# Patient Record
Sex: FEMALE | Race: WHITE | NOT HISPANIC OR LATINO | Employment: FULL TIME | URBAN - METROPOLITAN AREA
[De-identification: names, ages, dates, MRNs, and addresses within clinical notes are randomized per-mention and may not be internally consistent; named-entity substitution may affect disease eponyms.]

---

## 2017-01-04 ENCOUNTER — TRANSCRIBE ORDERS (OUTPATIENT)
Dept: ADMINISTRATIVE | Facility: HOSPITAL | Age: 42
End: 2017-01-04

## 2017-01-04 DIAGNOSIS — Z12.31 VISIT FOR SCREENING MAMMOGRAM: Primary | ICD-10-CM

## 2017-01-05 ENCOUNTER — HOSPITAL ENCOUNTER (OUTPATIENT)
Dept: MAMMOGRAPHY | Facility: MEDICAL CENTER | Age: 42
Discharge: HOME | End: 2017-01-05
Payer: COMMERCIAL

## 2017-01-05 ENCOUNTER — GENERIC CONVERSION - ENCOUNTER (OUTPATIENT)
Dept: OTHER | Facility: OTHER | Age: 42
End: 2017-01-05

## 2017-01-05 DIAGNOSIS — Z12.31 VISIT FOR SCREENING MAMMOGRAM: ICD-10-CM

## 2017-01-05 PROCEDURE — G0202 SCR MAMMO BI INCL CAD: HCPCS

## 2017-01-05 PROCEDURE — 77063 BREAST TOMOSYNTHESIS BI: CPT

## 2017-01-20 ENCOUNTER — TRANSCRIBE ORDERS (OUTPATIENT)
Dept: ADMINISTRATIVE | Facility: HOSPITAL | Age: 42
End: 2017-01-20

## 2017-01-20 DIAGNOSIS — R93.0 FAMILIAL ENLARGEMENT OF THE SELLA TURCICA: Primary | ICD-10-CM

## 2017-02-01 ENCOUNTER — HOSPITAL ENCOUNTER (OUTPATIENT)
Dept: MRI IMAGING | Facility: HOSPITAL | Age: 42
Discharge: HOME | End: 2017-02-01
Payer: COMMERCIAL

## 2017-02-01 DIAGNOSIS — R93.0 ABNORMAL FINDINGS ON DIAGNOSTIC IMAGING OF SKULL AND HEAD, NOT ELSEWHERE CLASSIFIED: ICD-10-CM

## 2017-02-01 PROCEDURE — A9585 GADOBUTROL INJECTION: HCPCS | Performed by: PHYSICIAN ASSISTANT

## 2017-02-01 PROCEDURE — 70553 MRI BRAIN STEM W/O & W/DYE: CPT

## 2017-02-01 RX ADMIN — GADOBUTROL 4.5 ML: 604.72 INJECTION INTRAVENOUS at 07:35

## 2017-03-20 ENCOUNTER — ALLSCRIPTS OFFICE VISIT (OUTPATIENT)
Dept: OTHER | Facility: OTHER | Age: 42
End: 2017-03-20

## 2017-03-25 ENCOUNTER — LAB CONVERSION - ENCOUNTER (OUTPATIENT)
Dept: OTHER | Facility: OTHER | Age: 42
End: 2017-03-25

## 2017-03-25 LAB — TSH SERPL DL<=0.05 MIU/L-ACNC: 1.29 MIU/L

## 2017-03-26 ENCOUNTER — GENERIC CONVERSION - ENCOUNTER (OUTPATIENT)
Dept: OTHER | Facility: OTHER | Age: 42
End: 2017-03-26

## 2017-12-07 ENCOUNTER — LAB CONVERSION - ENCOUNTER (OUTPATIENT)
Dept: OTHER | Facility: OTHER | Age: 42
End: 2017-12-07

## 2017-12-07 ENCOUNTER — GENERIC CONVERSION - ENCOUNTER (OUTPATIENT)
Dept: OTHER | Facility: OTHER | Age: 42
End: 2017-12-07

## 2017-12-07 LAB — TSH SERPL DL<=0.05 MIU/L-ACNC: 2 MIU/L

## 2017-12-28 ENCOUNTER — GENERIC CONVERSION - ENCOUNTER (OUTPATIENT)
Dept: FAMILY MEDICINE CLINIC | Facility: CLINIC | Age: 42
End: 2017-12-28

## 2018-01-13 NOTE — PROGRESS NOTES
Assessment    1  Hypothyroidism (244 9) (E03 9)   2  Encounter for preventive health examination (V70 0) (Z00 00)    Plan  Health Maintenance    · (1) HEMOGLOBIN A1C; Status:Active; Requested for:2016;    · (LC) Cotinine Screen ONLY, Serum; Status:Active; Requested [id number]; Health Maintenance, Hypothyroidism    · (1) LIPID PANEL FASTING W DIRECT LDL REFLEX; Status:Active; Requested  for:2016;   Hypothyroidism    · (1) CBC/ PLT (NO DIFF); Status:Active; Requested for:2016;    · (1) COMPREHENSIVE METABOLIC PANEL; Status:Active; Requested for:2016;     Discussion/Summary  health maintenance visit Currently, she eats a healthy diet and eats an adequate diet  cervical cancer screening is current Breast cancer screening: the risks and benefits of breast cancer screening were discussed and mammogram is needed every year  Colorectal cancer screening: the risks and benefits of colorectal cancer screening were discussed and colorectal cancer screening is current  Screening lab work includes hemoglobin, glucose, lipid profile and thyroid function testing  Well exam    Continue synthroid, recheck TSH in December  Patient is UTD with health screenings, labs as outlined above  Patient declines flu vaccine  The patient was counseled regarding instructions for management, risk factor reductions, impressions  Chief Complaint  Pt is here for annual physical  Pt offers no complaints  All meds/allergies reviewed with pt   feels better on Synthroid 75 mcg daily   Dr Thapa - gyn  menstrual migraines , chronic  would like to switch Rx to monophasic birth control formulation   otherwise she feels well, no complaints  no dizziness or earache, no tinnitus  sees dermatology every 6 months, recently diagnosed with BCC      History of Present Illness  HM, Adult Female: The patient is being seen for a health maintenance evaluation     General Health:   Screening:   HPI: UTD with mammo      Review of Systems    Constitutional: No fever, no chills, feels well, no tiredness, no recent weight gain or weight loss  Eyes: No complaints of eye pain, no red eyes, no eyesight problems, no discharge, no dry eyes, no itching of eyes  ENT: no complaints of earache, no loss of hearing, no nose bleeds, no nasal discharge, no sore throat, no hoarseness  Cardiovascular: No complaints of slow heart rate, no fast heart rate, no chest pain, no palpitations, no leg claudication, no lower extremity edema  Respiratory: No complaints of shortness of breath, no wheezing, no cough, no SOB on exertion, no orthopnea, no PND  Gastrointestinal: No complaints of abdominal pain, no constipation, no nausea or vomiting, no diarrhea, no bloody stools  Genitourinary: No complaints of dysuria, no incontinence, no pelvic pain, no dysmenorrhea, no vaginal discharge or bleeding  Musculoskeletal: No complaints of arthralgias, no myalgias, no joint swelling or stiffness, no limb pain or swelling  Integumentary: as noted in HPI  Neurological: headache  Psychiatric: Not suicidal, no sleep disturbance, no anxiety or depression, no change in personality, no emotional problems  Endocrine: No complaints of proptosis, no hot flashes, no muscle weakness, no deepening of the voice, no feelings of weakness  Hematologic/Lymphatic: No complaints of swollen glands, no swollen glands in the neck, does not bleed easily, does not bruise easily  Active Problems    1  Abdominal pain, LLQ (left lower quadrant) (789 04) (R10 32)   2  Abnormal MRI of head (793 0) (R93 0)   3  Acne (706 1) (L70 9)   4  Allergic rhinitis (477 9) (J30 9)   5  Allergy (995 3) (T78 40XA)   6  Atypical chest pain (786 59) (R07 89)   7  Bee sting reaction (989 5,E905 3) (T63 441A)   8  Bladder disorder (596 9) (N32 9)   9  Chronic tension-type headache, not intractable (339 12) (G44 229)   10  Compression Fracture Of Thoracic Vertebral Body (805 2)   11   Dyspepsia (536  8) (K30)   12  Dysuria (788 1) (R30 0)   13  Encounter for screening mammogram for breast cancer (V76 12) (Z12 31)   14  Esophageal reflux (530 81) (K21 9)   15  HI (hearing impairment) (389 9) (H91 90)   16  Hypothyroidism (244 9) (E03 9)   17  Irritable bowel syndrome (564 1) (K58 9)   18  Left Leg Strain (844 9)   19  Lower abdominal pain (789 09) (R10 30)   20  Sinusitis (473 9) (J32 9)   21  Upper respiratory infection (465 9) (J06 9)   22  Vertigo (780 4) (R42)    Past Medical History    · History of Acute serous otitis media, unspecified laterality    Surgical History    · History of Hernia Repair   · History of Wrist Surgery    Family History  Mother    · Family history of Hypothyroidism   · Family history of Migraine Headache  Father    · Family history of Pure Hypercholesterolemia  Paternal Grandmother    · Family history of Ovarian Cancer (V16 41)    Social History    · Being A Social Drinker   · Caffeine Use   · Denied: History of Drug Use   · Marital History - Currently    · Never A Smoker    Current Meds   1  Claritin CAPS; Therapy: (Ebb Door) to Recorded   2  EpiPen 2-Sukhdev 0 3 MG/0 3ML Injection Solution Auto-injector; use as directed  Requested   for: 63Uys4897; Last Rx:27Ioz2111 Ordered   3  Esomeprazole Magnesium 40 MG Oral Capsule Delayed Release; TAKE ONE   CAPSULE BY MOUTH EVERY DAY; Therapy: 55Azs0785 to (Evaluate:47Vfe1536)  Requested for: 00FHX5861; Last   Rx:18Nov2015 Ordered   4  Levothyroxine Sodium 75 MCG Oral Tablet; take 1 tablet by mouth every day; Therapy: 28OGD4166 to (Kwan Crumble)  Requested for: 92Mcc5233; Last   Rx:01Svo9496 Ordered   5  Nasonex 50 MCG/ACT Nasal Suspension; 2 SPRAYS INTO EACH NOSTRIL DAILY    Requested for: 29JJO9512; Last Rx:31Dnc5484 Ordered   6  NexIUM 40 MG Oral Capsule Delayed Release; TAKE 1 CAPSULE ONCE DAILY; Therapy: (Recorded:13Jan2016) to Recorded   7   Ortho Tri-Cyclen Lo 0 18/0 215/0 25 MG-25 MCG Oral Tablet; Therapy: (Recorded:30Mar2015) to Recorded    Allergies    1  Acetaminophen-Codeine #3 TABS   2  benzonatate   3  Fluticasone Propionate SUSP    Vitals   Recorded: 84AWS8023 26:59LI   Systolic 98   Diastolic 62   Heart Rate 72   Temperature 98 3 F   Height 5 ft 1 in   Weight 108 lb    BMI Calculated 20 41   BSA Calculated 1 45     Physical Exam    Constitutional   General appearance: No acute distress, well appearing and well nourished  Eyes   Conjunctiva and lids: No swelling, erythema or discharge  Ears, Nose, Mouth, and Throat   Otoscopic examination: Tympanic membranes translucent with normal light reflex  Canals patent without erythema  Neck   Neck: Supple, symmetric, trachea midline, no masses  Thyroid: Normal, no thyromegaly  Pulmonary   Respiratory effort: No increased work of breathing or signs of respiratory distress  Auscultation of lungs: Clear to auscultation  Cardiovascular   Auscultation of heart: Normal rate and rhythm, normal S1 and S2, no murmurs  Carotid pulses: 2+ bilaterally  Abdominal aorta: Normal     Peripheral vascular exam: Normal     Examination of extremities for edema and/or varicosities: Normal     Chest   Chest: Normal     Abdomen   Abdomen: Non-tender, no masses  Liver and spleen: No hepatomegaly or splenomegaly  Musculoskeletal   Gait and station: Normal     Joints, bones, and muscles: Normal     Range of motion: Normal     Neurologic   Cranial nerves: Cranial nerves II-XII intact  Psychiatric   Judgment and insight: Normal     Orientation to person, place, and time: Normal     Recent and remote memory: Intact  Mood and affect: Normal        Results/Data  U/S Head and Neck ( Soft Tissue) 92IRZ7958 12:55PM Valeria Moran     Test Name Result Flag Reference   U/S Head and Neck (Report)     10/16/2013 1400  10/16/2013 1414  NONE    THYROID ULTRASOUND    INDICATION- Hypothyroidism      COMPARISON- MRI cervical spine 8/6/2013    TECHNIQUE- Ultrasound of the thyroid was performed with a high  frequency linear transducer in transverse and sagittal planes including  volumetric imaging sweeps as well as traditional still imaging  technique  FINDINGS-    Right gland- 5 2 x 1 5 x 1 3 cm  Left gland- 3 6 x 1 4 x 1 2 cm  The isthmus is 0 3 cm in AP dimension  Heterogeneous echogenicity which could be seen in the setting of  Hashimoto's thyroiditis  RIGHT-  No nodules  LEFT-  No nodules  ISTHMUS-   No nodules  IMPRESSION-    Heterogeneous echogenicity which could be seen in the setting of  Hashimoto's thyroiditis  Correlate with laboratory data  No focal nodules  Transcribed on- Lawrence 191, RAD DO  Reading Radiologist- Willow Lew, RAD DO  Releasing Radiologist- RICK Cabral DO  Released Date Time- 10/16/13 1548  ------------------------------------------------------------------------------  [id number]^SHADIA MARAVILLA  [id number]^SHADIA MARAVILLA       Future Appointments    Date/Time Provider Specialty Site   01/16/2017 09:00 AM KEELY Rosario  [id number]     Signatures   Electronically signed by :  KEELY Crook ; Oct  4 2016  6:59PM EST                       (Author)

## 2018-01-14 VITALS
HEIGHT: 61 IN | DIASTOLIC BLOOD PRESSURE: 69 MMHG | RESPIRATION RATE: 14 BRPM | HEART RATE: 84 BPM | WEIGHT: 107 LBS | SYSTOLIC BLOOD PRESSURE: 119 MMHG | BODY MASS INDEX: 20.2 KG/M2

## 2018-01-15 NOTE — RESULT NOTES
Message   Call patient, TSH is normal, continue same dose of Synthroid  Verified Results  (Q) TSH, 3RD GENERATION [id number] 12:20PM [facility]     Test Name Result Flag Reference   TSH 2 40 mIU/L     Reference Range                         > or = 20 Years  0 40-4 50                              Pregnancy Ranges            First trimester    0 26-2 66            Second trimester   0 55-2 73            Third trimester    0 43-2 91       Signatures   Electronically signed by :  KEELY Shi ; Feb 23 2016 11:56AM EST                       (Author)

## 2018-01-15 NOTE — RESULT NOTES
Message    Please call patient  TSH is normal, continue same dose of Synthroid  We will need to repeat blood work in 3 months, slip printed         Verified Results  (Q) TSH, 3RD GENERATION [id number] 11:50AM [facility]   REPORT COMMENT:  FASTING:NO     Test Name Result Flag Reference   TSH 0 59 mIU/L     Reference Range                         > or = 20 Years  0 40-4 50                              Pregnancy Ranges            First trimester    0 26-2 66            Second trimester   0 55-2 73            Third trimester    0 43-2 91       Signatures  Electronically signed by :  KEELY Shi ; Sep 17 2016  7:45PM EST                       (Author)

## 2018-01-18 NOTE — RESULT NOTES
Message   Please contact patient, TSH is normal, continue same dose of Synthroid  Thank you     Verified Results  (Q) TSH, 3RD GENERATION [id number] 12:05PM Eston Loly     Test Name Result Flag Reference   TSH 1 29 mIU/L     Reference Range                         > or = 20 Years  0 40-4 50                              Pregnancy Ranges            First trimester    0 26-2 66            Second trimester   0 55-2 73            Third trimester    0 43-2 91       Signatures   Electronically signed by :  KEELY Barros ; Mar 26 2017  9:44AM EST                       (Author)

## 2018-03-05 ENCOUNTER — TRANSCRIBE ORDERS (OUTPATIENT)
Dept: ADMINISTRATIVE | Facility: HOSPITAL | Age: 43
End: 2018-03-05

## 2018-03-05 DIAGNOSIS — Z12.39 SCREENING BREAST EXAMINATION: Primary | ICD-10-CM

## 2018-03-08 DIAGNOSIS — G43.009 MIGRAINE WITHOUT AURA AND WITHOUT STATUS MIGRAINOSUS, NOT INTRACTABLE: Primary | ICD-10-CM

## 2018-03-08 RX ORDER — SUMATRIPTAN 50 MG/1
TABLET, FILM COATED ORAL
Qty: 9 TABLET | Refills: 3 | Status: SHIPPED | OUTPATIENT
Start: 2018-03-08 | End: 2018-03-20 | Stop reason: SDUPTHER

## 2018-03-16 ENCOUNTER — HOSPITAL ENCOUNTER (OUTPATIENT)
Dept: MAMMOGRAPHY | Facility: MEDICAL CENTER | Age: 43
Discharge: HOME | End: 2018-03-16
Payer: COMMERCIAL

## 2018-03-16 DIAGNOSIS — Z12.39 SCREENING BREAST EXAMINATION: ICD-10-CM

## 2018-03-16 PROCEDURE — 77063 BREAST TOMOSYNTHESIS BI: CPT

## 2018-03-16 PROCEDURE — 77067 SCR MAMMO BI INCL CAD: CPT

## 2018-03-20 ENCOUNTER — OFFICE VISIT (OUTPATIENT)
Dept: NEUROLOGY | Facility: CLINIC | Age: 43
End: 2018-03-20
Payer: COMMERCIAL

## 2018-03-20 ENCOUNTER — OFFICE VISIT (OUTPATIENT)
Dept: FAMILY MEDICINE CLINIC | Facility: CLINIC | Age: 43
End: 2018-03-20
Payer: COMMERCIAL

## 2018-03-20 VITALS
BODY MASS INDEX: 20.01 KG/M2 | HEART RATE: 80 BPM | HEIGHT: 61 IN | DIASTOLIC BLOOD PRESSURE: 70 MMHG | RESPIRATION RATE: 16 BRPM | SYSTOLIC BLOOD PRESSURE: 122 MMHG | WEIGHT: 106 LBS | TEMPERATURE: 98 F

## 2018-03-20 VITALS
SYSTOLIC BLOOD PRESSURE: 115 MMHG | HEART RATE: 83 BPM | HEIGHT: 61 IN | WEIGHT: 105 LBS | DIASTOLIC BLOOD PRESSURE: 63 MMHG | BODY MASS INDEX: 19.83 KG/M2

## 2018-03-20 DIAGNOSIS — H90.5 SENSORINEURAL HEARING LOSS (SNHL) OF LEFT EAR, UNSPECIFIED HEARING STATUS ON CONTRALATERAL SIDE: ICD-10-CM

## 2018-03-20 DIAGNOSIS — R93.0 ABNORMAL MRI OF HEAD: ICD-10-CM

## 2018-03-20 DIAGNOSIS — R93.0 ABNORMAL MRI OF HEAD: Primary | ICD-10-CM

## 2018-03-20 DIAGNOSIS — E03.9 HYPOTHYROIDISM, UNSPECIFIED TYPE: ICD-10-CM

## 2018-03-20 DIAGNOSIS — G43.009 MIGRAINE WITHOUT AURA AND WITHOUT STATUS MIGRAINOSUS, NOT INTRACTABLE: ICD-10-CM

## 2018-03-20 DIAGNOSIS — G43.809 HEADACHE, VARIANT MIGRAINE: ICD-10-CM

## 2018-03-20 DIAGNOSIS — Z00.00 WELL ADULT EXAM: Primary | ICD-10-CM

## 2018-03-20 PROCEDURE — 99396 PREV VISIT EST AGE 40-64: CPT | Performed by: FAMILY MEDICINE

## 2018-03-20 PROCEDURE — 99214 OFFICE O/P EST MOD 30 MIN: CPT | Performed by: PSYCHIATRY & NEUROLOGY

## 2018-03-20 RX ORDER — LORATADINE 10 MG/1
CAPSULE, LIQUID FILLED ORAL
COMMUNITY

## 2018-03-20 RX ORDER — LEVOTHYROXINE SODIUM 0.07 MG/1
1 TABLET ORAL DAILY
COMMUNITY
Start: 2013-06-06 | End: 2018-08-28 | Stop reason: SDUPTHER

## 2018-03-20 RX ORDER — DIPHENOXYLATE HYDROCHLORIDE AND ATROPINE SULFATE 2.5; .025 MG/1; MG/1
1 TABLET ORAL DAILY
COMMUNITY

## 2018-03-20 RX ORDER — NORETHINDRONE ACETATE AND ETHINYL ESTRADIOL, ETHINYL ESTRADIOL AND FERROUS FUMARATE 1MG-10(24)
1 KIT ORAL DAILY
COMMUNITY
Start: 2018-03-08 | End: 2022-06-23 | Stop reason: SDUPTHER

## 2018-03-20 RX ORDER — SUMATRIPTAN 50 MG/1
TABLET, FILM COATED ORAL
Qty: 9 TABLET | Refills: 3 | Status: SHIPPED | OUTPATIENT
Start: 2018-03-20 | End: 2018-11-07

## 2018-03-20 RX ORDER — EPINEPHRINE 0.3 MG/.3ML
0.3 INJECTION SUBCUTANEOUS AS NEEDED
COMMUNITY
End: 2021-04-05 | Stop reason: SDUPTHER

## 2018-03-20 NOTE — PROGRESS NOTES
FAMILY PRACTICE OFFICE VISIT       NAME: Олег Nettles  AGE: 43 y o  SEX: female       : 1975        MRN: [id number]    DATE: 3/20/2018  TIME: 11:28 AM    Assessment and Plan     Problem List Items Addressed This Visit     Hypothyroidism    Relevant Medications    levothyroxine 75 mcg tablet    Other Relevant Orders    CBC    Comprehensive metabolic panel    Lipid panel    TSH, 3rd generation    Vitamin D 25 hydroxy    Abnormal MRI of head    Hearing loss    Relevant Orders    Ambulatory Referral to Otolaryngology    Comprehensive metabolic panel    Lipid panel    TSH, 3rd generation    Headache, variant migraine    Relevant Orders    CBC    Vitamin D 25 hydroxy      Other Visit Diagnoses     Well adult exam    -  Primary      Annual well exam   No concerns  Migraine headaches  Significantly improved on low-dose birth control pill therapy  Patient has pending follow-up with Neurology today  Hypothyroidism-continue Synthroid 75 mcg daily  Patient will proceed with routine blood work  She is up-to-date with gyn exam and mammography  Patient will schedule follow-up with ENT regarding left-sided hearing loss  Follow-up pending blood work results, annually and as needed    There are no Patient Instructions on file for this visit  Chief Complaint     Chief Complaint   Patient presents with    Physical Exam     Pt is here for annual exam        History of Present Illness     Annual  Well exam  Menstrual  Migraines, imtelyproved  la  Patient is on  Lo-Loestrin -  3 packs back to  Back -   Uses Imitrex prn, it works well  Pending  Neurology OV today in ; Pending OV with Dr Davis today   Left  Hearing  Loss- past due for ENT exam for follow up of left sided hearing loss   UTD with GYN  UTD with mammo  Patient remains under care of neurology  Re : abnormal MRI brain  Patient remains active, exercises regularly  She denies chest pain, palpitations, shortness of breath or dizziness    No exertional symptoms  No abdominal pain, no arthralgias  Review of Systems   Review of Systems   HENT: Positive for hearing loss  Neurological: Positive for headaches  All other systems reviewed and are negative  Active Problem List     Patient Active Problem List   Diagnosis    Hypothyroidism    Abnormal MRI of head    Hearing loss    Headache, variant migraine       Past Medical History:  No past medical history on file  Past Surgical History:  Past Surgical History:   Procedure Laterality Date    HERNIA REPAIR Left 09/2014    Sports Hernia Repair    WRIST SURGERY Right 03/2009    Dr Hemalatha Reese       Family History:  Family History   Problem Relation Age of Onset    Hypothyroidism Mother     Migraines Mother     Hyperlipidemia Father      Pure    Ovarian cancer Paternal Grandmother        Social History:  Social History     Social History    Marital status: /Civil Union     Spouse name: N/A    Number of children: N/A    Years of education: N/A     Occupational History    Not on file  Social History Main Topics    Smoking status: Never Smoker    Smokeless tobacco: Never Used    Alcohol use Yes      Comment: Social     Drug use: No    Sexual activity: Not on file     Other Topics Concern    Not on file     Social History Narrative    Caffeine use           Objective     Vitals:    03/20/18 1102   BP: 122/70   Pulse: 80   Resp: 16   Temp: 98 °F (36 7 °C)     Wt Readings from Last 3 Encounters:   03/20/18 48 1 kg (106 lb)   03/20/17 48 5 kg (107 lb)   02/01/17 46 7 kg (103 lb)       Physical Exam   Constitutional: She is oriented to person, place, and time  She appears well-developed and well-nourished  HENT:   Head: Normocephalic and atraumatic  Eyes: Conjunctivae are normal    Neck: Neck supple  Carotid bruit is not present  No thyromegaly present  Cardiovascular: Normal rate, regular rhythm and normal heart sounds  No murmur heard    Pulmonary/Chest: Effort normal and breath sounds normal  No respiratory distress  She has no wheezes  She has no rales  Abdominal: Soft  Normal aorta and bowel sounds are normal  She exhibits no distension and no abdominal bruit  There is no tenderness  There is no rebound  Musculoskeletal: Normal range of motion  Neurological: She is alert and oriented to person, place, and time  No cranial nerve deficit  Psychiatric: She has a normal mood and affect  Her behavior is normal    Nursing note and vitals reviewed  Pertinent Laboratory/Diagnostic Studies:  Lab Results   Component Value Date    GLUCOSE 72 11/02/2015    BUN 10 10/19/2016    CREATININE 0 70 10/19/2016    CALCIUM 9 4 10/19/2016     10/19/2016    K 4 6 10/19/2016    CO2 28 10/19/2016     10/19/2016     Lab Results   Component Value Date    ALT 11 10/19/2016    AST 14 10/19/2016    ALKPHOS 37 10/19/2016    BILITOT 0 4 10/19/2016       Lab Results   Component Value Date    WBC 5 1 10/19/2016    HGB 12 6 10/19/2016    HCT 39 2 10/19/2016    MCV 95 3 10/19/2016     10/19/2016       No results found for: TSH    Lab Results   Component Value Date    CHOL 200 10/19/2016     Lab Results   Component Value Date    TRIG 85 10/19/2016     Lab Results   Component Value Date    HDL 76 10/19/2016     Lab Results   Component Value Date    LDLCALC 108 (H) 11/02/2015     Lab Results   Component Value Date    HGBA1C 5 5 10/19/2016       Results for orders placed or performed in visit on 12/07/17   TSH, 3RD GENERATION (HISTORICAL)   Result Value Ref Range    TSH 3RD GENERATON 2 00 mIU/L       Orders Placed This Encounter   Procedures    CBC    Comprehensive metabolic panel    Lipid panel    TSH, 3rd generation    Vitamin D 25 hydroxy    Ambulatory Referral to Otolaryngology       ALLERGIES:  Allergies   Allergen Reactions    Benzonatate Nausea Only     Category:  Adverse Reaction;     Codeine      Other reaction(s): Palpitations  Reaction Date: 03Aug2011;     Fluticasone Action Taken: nose bleeds;        Current Medications     Current Outpatient Prescriptions   Medication Sig Dispense Refill    EPINEPHrine (EPIPEN 2-ABDOUL) 0 3 mg/0 3 mL SOAJ Inject 0 3 mg as directed as needed        levothyroxine 75 mcg tablet Take 1 tablet by mouth daily      LO LOESTRIN FE 1 MG-10 MCG / 10 MCG TABS Take 1 tablet by mouth daily        Loratadine (CLARITIN) 10 MG CAPS Take by mouth      SUMAtriptan (IMITREX) 50 mg tablet TAKE 1 TABLET AT ONSET OF MIGRAINE  MAY REPEAT ONCE IN 2 HOURS IF NEEDED  9 tablet 3     No current facility-administered medications for this visit            Health Maintenance     Health Maintenance   Topic Date Due    HIV SCREENING  1975    Depression Screening PHQ-9  12/12/1987    INFLUENZA VACCINE  09/01/2017    DTaP,Tdap,and Td Vaccines (2 - Td) 09/18/2023     Immunization History   Administered Date(s) Administered    Tdap 09/18/2013       Lizbet Silva MD

## 2018-03-20 NOTE — PROGRESS NOTES
Patient ID: Sanford Kirkpatrick is a 43 y o  female  Assessment/Plan:    Headache, variant migraine  Pt here for neuro follow up  Pt last here one yr ago  Pt just seen by her pcp today as well  Pt notes no new sxs  No change in neuro exam today  Pt is due for updated audiology  Pt has last mri head done about one yr ago  Re rev that study and no change at that time comp to 2014  Due to stability of sxs and exam will hold on imaging this yr and plan for 2019 unless pt has any other new sxs  Strong family history of migraine in mom and sister         Diagnoses and all orders for this visit:    Abnormal MRI of head    Headache, variant migraine    Sensorineural hearing loss (SNHL) of left ear, unspecified hearing status on contralateral side    Migraine without aura and without status migrainosus, not intractable  -     SUMAtriptan (IMITREX) 50 mg tablet; 1 tab at onset of migraine, may repeat once in 2 hours if needed    Other orders  -     multivitamin (THERAGRAN) TABS; Take 1 tablet by mouth daily           Subjective:    HPI  HPI: Patient is a 43year old female who presents for neurological follow up, last seen in March 2017  Patient was referred for follow up of abnormal MRI of the brain  In 2013, she developed sudden onset of left hearing loss  She continues to follow with ENT and audiology  Patient had initial MRI brain done in Feb 2013 with non-specific WM changes, predominantly in the frontal lobes  Patient with history of migraines as well  Patient had cord imaging which was negative for any cord lesions  MRI brain has been followed for any changes  Pt last seen about one yr ago,  Overall she is doing well  No new sxs  Pt changed jobs this yr  No change in vision  No change in speech or swallowing  No falls or trips  Pt with migraines about once a month to every other month    Pt notes the imitrex does help with her ha,  Discussed use of preventative meds as well and pt not interested at this time which is reasonable due to the improved freq of her ha  Pt is now using loestrin bcp which also has decreased the freq of her has  Pt is aware of risk of cva with migraines and also bcp  Pt with min use of her imitirex as well  No cp or sob with her imitrex med  No falls or trips  No recent fever or chills  Pt notes weather occ affects her ha severity  GYN follows her  oral contraceptive pill that she takes for 3 months and then off for one month to have a period  She does not have any migraines during the months she is taking the pill  When she has her period quarterly, she gets a  migraine  OTC meds have not been very helpful for the migraine  Her sister and mother take Imitrex for their migraines and suggested she ask for a Rx  When she gets a migraine, it is located on the right side of her head above her eye  pt cont to denies any hemisensory loss, visual changes, vertigo, trouble with speech or swallowing  Re rev last MRI brain 2/1/17 compared to 9/2014  Stable MR appearance of the brain  Non-specific WM changes right greater than left frontal lobes are stable  Query complicated migraine  Pt is following up with audiology as directed by her pcp  No facial paresthesias  No diplopia  Total time spent today 25 minutes  Greater than 50% of total time was spent with the patient and / or family counseling and / or coordination of care          The following portions of the patient's history were reviewed and updated as appropriate: allergies, current medications, past family history, past medical history, past social history, past surgical history and problem list          Objective:    Blood pressure 115/63, pulse 83, height 5' 1" (1 549 m), weight 47 6 kg (105 lb), last menstrual period 02/25/2018  Physical Exam   Constitutional: She appears well-developed and well-nourished  Eyes: EOM are normal  Pupils are equal, round, and reactive to light  Cardiovascular: Intact distal pulses  Neurological: She has normal strength and normal reflexes  Psychiatric: Her speech is normal        Neurological Exam    Mental Status  The patient is alert and oriented to person, place, time, and situation  Her recent and remote memory are normal  Her speech is normal  Her language is fluent with no aphasia  She has normal attention span and concentration  She has a normal fund of knowledge  Cranial Nerves    CN II: The patient's visual acuity and visual fields are normal   CN III, IV, VI: The patient's pupils are equally round and reactive to light and ocular movements are normal   CN V: The patient has normal facial sensation  CN VII:  The patient has symmetric facial movement  CN VIII:  The patient's hearing is normal   CN IX, X: The patient has symmetric palate movement and normal gag reflex  CN XI: The patient's shoulder shrug strength is normal   CN XII: The patient's tongue is midline without atrophy or fasciculations  Motor  The patient has normal muscle bulk throughout  Her overall muscle tone is normal throughout  Her strength is 5/5 throughout all four extremities  Sensory  The patient's sensation is normal in all four extremities  Reflexes  Deep tendon reflexes are 2+ and symmetric in all four extremities with downgoing toes bilaterally  Gait and Coordination   She has a normal stride  She has normal right finger to nose coordination  ROS:    Review of Systems   Constitutional: Negative  HENT: Positive for congestion, sinus pain and sinus pressure  Eyes: Negative  Respiratory: Negative  Cardiovascular: Negative  Gastrointestinal: Negative  Endocrine: Negative  Genitourinary: Negative  Musculoskeletal: Negative  Skin: Negative  Allergic/Immunologic: Negative  Neurological: Negative  Hematological: Negative  Psychiatric/Behavioral: Negative

## 2018-03-20 NOTE — ASSESSMENT & PLAN NOTE
Pt here for neuro follow up  Pt last here one yr ago  Pt just seen by her pcp today as well  Pt notes no new sxs  No change in neuro exam today  Pt is due for updated audiology  Pt has last mri head done about one yr ago  Re rev that study and no change at that time comp to 2014  Due to stability of sxs and exam will hold on imaging this yr and plan for 2019 unless pt has any other new sxs  Strong family history of migraine in mom and sister

## 2018-06-04 ENCOUNTER — APPOINTMENT (OUTPATIENT)
Dept: LAB | Facility: CLINIC | Age: 43
End: 2018-06-04
Payer: COMMERCIAL

## 2018-06-04 DIAGNOSIS — H90.5 SENSORINEURAL HEARING LOSS (SNHL) OF LEFT EAR, UNSPECIFIED HEARING STATUS ON CONTRALATERAL SIDE: ICD-10-CM

## 2018-06-04 DIAGNOSIS — E03.9 HYPOTHYROIDISM, UNSPECIFIED TYPE: ICD-10-CM

## 2018-06-04 DIAGNOSIS — G43.809 HEADACHE, VARIANT MIGRAINE: ICD-10-CM

## 2018-06-04 LAB
25(OH)D3 SERPL-MCNC: 26.4 NG/ML (ref 30–100)
ALBUMIN SERPL BCP-MCNC: 3.6 G/DL (ref 3.5–5)
ALP SERPL-CCNC: 40 U/L (ref 46–116)
ALT SERPL W P-5'-P-CCNC: 19 U/L (ref 12–78)
ANION GAP SERPL CALCULATED.3IONS-SCNC: 9 MMOL/L (ref 4–13)
AST SERPL W P-5'-P-CCNC: 12 U/L (ref 5–45)
BILIRUB SERPL-MCNC: 0.41 MG/DL (ref 0.2–1)
BUN SERPL-MCNC: 10 MG/DL (ref 5–25)
CALCIUM SERPL-MCNC: 8.8 MG/DL (ref 8.3–10.1)
CHLORIDE SERPL-SCNC: 105 MMOL/L (ref 100–108)
CHOLEST SERPL-MCNC: 178 MG/DL (ref 50–200)
CO2 SERPL-SCNC: 24 MMOL/L (ref 21–32)
CREAT SERPL-MCNC: 0.8 MG/DL (ref 0.6–1.3)
ERYTHROCYTE [DISTWIDTH] IN BLOOD BY AUTOMATED COUNT: 12.9 % (ref 11.6–15.1)
GFR SERPL CREATININE-BSD FRML MDRD: 91 ML/MIN/1.73SQ M
GLUCOSE P FAST SERPL-MCNC: 74 MG/DL (ref 65–99)
HCT VFR BLD AUTO: 40.7 % (ref 34.8–46.1)
HDLC SERPL-MCNC: 63 MG/DL (ref 40–60)
HGB BLD-MCNC: 13 G/DL (ref 11.5–15.4)
LDLC SERPL CALC-MCNC: 104 MG/DL (ref 0–100)
MCH RBC QN AUTO: 31.3 PG (ref 26.8–34.3)
MCHC RBC AUTO-ENTMCNC: 31.9 G/DL (ref 31.4–37.4)
MCV RBC AUTO: 98 FL (ref 82–98)
NONHDLC SERPL-MCNC: 115 MG/DL
PLATELET # BLD AUTO: 263 THOUSANDS/UL (ref 149–390)
PMV BLD AUTO: 12.2 FL (ref 8.9–12.7)
POTASSIUM SERPL-SCNC: 4.1 MMOL/L (ref 3.5–5.3)
PROT SERPL-MCNC: 7.2 G/DL (ref 6.4–8.2)
RBC # BLD AUTO: 4.16 MILLION/UL (ref 3.81–5.12)
SODIUM SERPL-SCNC: 138 MMOL/L (ref 136–145)
TRIGL SERPL-MCNC: 55 MG/DL
TSH SERPL DL<=0.05 MIU/L-ACNC: 1.52 UIU/ML (ref 0.36–3.74)
WBC # BLD AUTO: 5.71 THOUSAND/UL (ref 4.31–10.16)

## 2018-06-04 PROCEDURE — 36415 COLL VENOUS BLD VENIPUNCTURE: CPT

## 2018-06-04 PROCEDURE — 80061 LIPID PANEL: CPT

## 2018-06-04 PROCEDURE — 84443 ASSAY THYROID STIM HORMONE: CPT

## 2018-06-04 PROCEDURE — 82306 VITAMIN D 25 HYDROXY: CPT

## 2018-06-04 PROCEDURE — 85027 COMPLETE CBC AUTOMATED: CPT

## 2018-06-04 PROCEDURE — 80053 COMPREHEN METABOLIC PANEL: CPT

## 2018-06-07 ENCOUNTER — TELEPHONE (OUTPATIENT)
Dept: FAMILY MEDICINE CLINIC | Facility: CLINIC | Age: 43
End: 2018-06-07

## 2018-06-07 NOTE — TELEPHONE ENCOUNTER
----- Message from Jordan Coronado MD sent at 6/6/2018  4:14 PM EDT -----  Please contact patient  All her blood work was normal aside from decreased level of vitamin-D  Her vitamin-D currently is 26, optimal level is over 40  Please clarify how much vitamin-D she is taking on a daily basis and let me know so I can give her further instructions  Her thyroid function is fine, continue same dose of Synthroid    Thank you

## 2018-06-07 NOTE — TELEPHONE ENCOUNTER
Patient is aware of MD instructions  Patient states that she is not taking a vitamin D supplement at this time

## 2018-08-28 DIAGNOSIS — E03.9 HYPOTHYROIDISM, UNSPECIFIED TYPE: Primary | ICD-10-CM

## 2018-08-28 RX ORDER — LEVOTHYROXINE SODIUM 0.07 MG/1
TABLET ORAL
Qty: 90 TABLET | Refills: 2 | Status: SHIPPED | OUTPATIENT
Start: 2018-08-28 | End: 2019-05-25 | Stop reason: SDUPTHER

## 2018-09-05 ENCOUNTER — EVALUATION (OUTPATIENT)
Dept: PHYSICAL THERAPY | Facility: CLINIC | Age: 43
End: 2018-09-05
Payer: COMMERCIAL

## 2018-09-05 DIAGNOSIS — M76.822 POSTERIOR TIBIAL TENDINITIS OF LEFT LEG: Primary | ICD-10-CM

## 2018-09-05 PROCEDURE — 97162 PT EVAL MOD COMPLEX 30 MIN: CPT | Performed by: PHYSICAL THERAPIST

## 2018-09-05 PROCEDURE — 97140 MANUAL THERAPY 1/> REGIONS: CPT | Performed by: PHYSICAL THERAPIST

## 2018-09-05 PROCEDURE — G8978 MOBILITY CURRENT STATUS: HCPCS | Performed by: PHYSICAL THERAPIST

## 2018-09-05 PROCEDURE — G8979 MOBILITY GOAL STATUS: HCPCS | Performed by: PHYSICAL THERAPIST

## 2018-09-05 NOTE — LETTER
2018    MD Gera [address] [place] [address]    Patient: Leydi Nettles   YOB: 1975   Date of Visit: 2018     Encounter Diagnosis     ICD-10-CM    1  Posterior tibial tendinitis of left leg M76 822        Dear Dr Linda Jackson:    Please review the attached Plan of Care from [facility]'s recent visit  Please verify that you agree therapy should continue by signing the attached document and sending it back to our office  If you have any questions or concerns, please don't hesitate to call  Sincerely,    Yoko Ng, PT      Referring Provider:      I certify that I have read the below Plan of Care and certify the need for these services furnished under this plan of treatment while under my care  MD Gera [address] [facility]: [phone]          PT Evaluation     Today's date: 2018  Patient name: Shashank Wyman  : 1975  MRN: [id number]  Referring provider: Ashley Saul MD  Dx:   Encounter Diagnosis     ICD-10-CM    1  Posterior tibial tendinitis of left leg M76 822                   Assessment  Impairments: abnormal muscle firing, abnormal or restricted ROM, activity intolerance, impaired physical strength, lacks appropriate home exercise program and pain with function    Assessment details: Shashank Wyman is a 43 y o  female present with:   Posterior tibial tendinitis of left leg  (primary encounter diagnosis)    [facility] has the above listed impairments and will benefit from skilled PT to improve deficits to return to prior level of function     Understanding of Dx/Px/POC: good   Prognosis: good    Goals  Impairment Goals  - Decrease pain   - Improve ROM to [facility]  - Increase strength     Functional Goals  - Return to Prior Level of Function  - Increase Functional Status Measure  - Patient will be independent with HEP    Plan  Patient would benefit from: skilled PT  Planned therapy interventions: joint mobilization, manual therapy, patient education, postural training, activity modification, abdominal trunk stabilization, body mechanics training, flexibility, functional ROM exercises, graded exercise, home exercise program, neuromuscular re-education, strengthening, stretching, therapeutic activities and therapeutic exercise  Frequency: 2x week  Duration in weeks: 8  Treatment plan discussed with: patient        Subjective Evaluation    Pain  Current pain rating: 3  At best pain ratin  At worst pain ratin  Relieving factors: rest  Aggravating factors: walking and running  Progression: no change        This Pt presents with chronic L ankle pain  In 2018 she rolled her ankle while running  X-rays were neg  She continued to run with an ankle brace and k-tape  She did some home exs   Pain persisted intermittently  Feels better with supportive shoes and KT tape  Pain is in region of post tib tendon  She has reduced her running  She presently runs 2x/wk about 3 miles ea, swims and uses elliptical   Would like to return to running 15-20 miles per week        Objective  Mild pes planus/calc valgum R > L  TTP post tib tendon, med tib border, navicular, ATF L   PROM: WFL      MMT L  Inv 4-/5  Ev 4-/5   DF     Gait:  Excessive STJ Pronation  + "too many toes"    RCSP  R everted  L everted    - ant drawer  Hallux DF PROM decreased bilat        Precautions: none    Daily Treatment Diary       Manuals             GT post tib tendon 6'            Hallux PROM             Laser post tib 4'                         Exercise Diary              tband inv/ev             Heel raises             Cross over step up             S/l abd             Clam shells             Gastroc/soleus stretch             SLB                                                                 Check hip strength             Casting Modalities

## 2018-09-05 NOTE — PROGRESS NOTES
PT Evaluation     Today's date: 2018  Patient name: Shane Alarcon  : 1975  MRN: [id number]  Referring provider: Ivan Park MD  Dx:   Encounter Diagnosis     ICD-10-CM    1  Posterior tibial tendinitis of left leg M76 822                   Assessment  Impairments: abnormal muscle firing, abnormal or restricted ROM, activity intolerance, impaired physical strength, lacks appropriate home exercise program and pain with function    Assessment details: Shane Alarcon is a 43 y o  female present with:   Posterior tibial tendinitis of left leg  (primary encounter diagnosis)    Raisa Grant has the above listed impairments and will benefit from skilled PT to improve deficits to return to prior level of function  Understanding of Dx/Px/POC: good   Prognosis: good    Goals  Impairment Goals  - Decrease pain   - Improve ROM to [facility]  - Increase strength     Functional Goals  - Return to Prior Level of Function  - Increase Functional Status Measure  - Patient will be independent with HEP    Plan  Patient would benefit from: skilled PT  Planned therapy interventions: joint mobilization, manual therapy, patient education, postural training, activity modification, abdominal trunk stabilization, body mechanics training, flexibility, functional ROM exercises, graded exercise, home exercise program, neuromuscular re-education, strengthening, stretching, therapeutic activities and therapeutic exercise  Frequency: 2x week  Duration in weeks: 8  Treatment plan discussed with: patient        Subjective Evaluation    Pain  Current pain rating: 3  At best pain ratin  At worst pain ratin  Relieving factors: rest  Aggravating factors: walking and running  Progression: no change        This Pt presents with chronic L ankle pain  In 2018 she rolled her ankle while running  X-rays were neg  She continued to run with an ankle brace and k-tape  She did some home exs   Pain persisted intermittently    Feels better with supportive shoes and KT tape  Pain is in region of post tib tendon  She has reduced her running  She presently runs 2x/wk about 3 miles ea, swims and uses elliptical   Would like to return to running 15-20 miles per week        Objective  Mild pes planus/calc valgum R > L  TTP post tib tendon, med tib border, navicular, ATF L   PROM: WFL      MMT L  Inv 4-/5  Ev 4-/5   DF     Gait:  Excessive STJ Pronation  + "too many toes"    RCSP  R everted  L everted    - ant drawer  Hallux DF PROM decreased bilat        Precautions: none    Daily Treatment Diary       Manuals 9/5            GT post tib tendon 6'            Hallux PROM             Laser post tib 4'                         Exercise Diary              tband inv/ev             Heel raises             Cross over step up             S/l abd             Clam shells             Gastroc/soleus stretch             SLB                                                                 Check hip strength             Casting                                                                                                                                  Modalities

## 2018-09-06 ENCOUNTER — TRANSCRIBE ORDERS (OUTPATIENT)
Dept: PHYSICAL THERAPY | Facility: CLINIC | Age: 43
End: 2018-09-06

## 2018-09-06 DIAGNOSIS — M76.822 POSTERIOR TIBIAL TENDINITIS OF LEFT LEG: Primary | ICD-10-CM

## 2018-09-13 ENCOUNTER — OFFICE VISIT (OUTPATIENT)
Dept: PHYSICAL THERAPY | Facility: CLINIC | Age: 43
End: 2018-09-13
Payer: COMMERCIAL

## 2018-09-13 DIAGNOSIS — M76.822 POSTERIOR TIBIAL TENDINITIS OF LEFT LEG: Primary | ICD-10-CM

## 2018-09-13 PROCEDURE — 97140 MANUAL THERAPY 1/> REGIONS: CPT | Performed by: PHYSICAL THERAPIST

## 2018-09-13 PROCEDURE — L3010 FOOT LONGITUDINAL ARCH SUPPO: HCPCS | Performed by: PHYSICAL THERAPIST

## 2018-09-13 PROCEDURE — 97110 THERAPEUTIC EXERCISES: CPT | Performed by: PHYSICAL THERAPIST

## 2018-09-13 NOTE — PROGRESS NOTES
Daily Note     Today's date: 2018  Patient name: Elvin Henriquez  : 1975  MRN: [id number]  Referring provider: Ghanshyam Cole MD  Dx:   Encounter Diagnosis     ICD-10-CM    1  Posterior tibial tendinitis of left leg M76 822                   Subjective: no signif changes reported      Objective: See treatment diary below  Precautions: none     Daily Treatment Diary         Manuals                    GT post tib tendon 6'                     Hallux PROM                       Laser post tib 4'                                             Exercise Diary                        tband inv/ev                       Heel raises                       Cross over step up                       S/l abd                       Clam shells                       Gastroc/soleus stretch                       SLB                                                                                                                       Check hip strength                       Casting    x                                                                                                                                                                                                                                           Modalities                                                                              Assessment: Tolerated treatment well  Patient exhibited good technique with therapeutic exercises      Plan: Continue per plan of care

## 2018-09-19 ENCOUNTER — OFFICE VISIT (OUTPATIENT)
Dept: PHYSICAL THERAPY | Facility: CLINIC | Age: 43
End: 2018-09-19
Payer: COMMERCIAL

## 2018-09-19 DIAGNOSIS — M76.822 POSTERIOR TIBIAL TENDINITIS OF LEFT LEG: Primary | ICD-10-CM

## 2018-09-19 PROCEDURE — 97140 MANUAL THERAPY 1/> REGIONS: CPT | Performed by: PHYSICAL THERAPIST

## 2018-09-19 PROCEDURE — 97112 NEUROMUSCULAR REEDUCATION: CPT | Performed by: PHYSICAL THERAPIST

## 2018-09-19 PROCEDURE — 97110 THERAPEUTIC EXERCISES: CPT | Performed by: PHYSICAL THERAPIST

## 2018-09-19 NOTE — PROGRESS NOTES
Daily Note     Today's date: 2018  Patient name: Chente Pérez  : 1975  MRN: [id number]  Referring provider: Donna Beltran MD  Dx:   Encounter Diagnosis     ICD-10-CM    1  Posterior tibial tendinitis of left leg M76 822                   Subjective: pt reports some soreness from GT  Objective: See treatment diary below  Precautions: none     Daily Treatment Diary         Manuals                  GT post tib tendon 6'  6'  6'                 Hallux PROM    3'  3'                 Laser post tib 4'  4'  4'                                         Exercise Diary                        tband inv/ev, CC inv    bl x30  b 3x10                 Heel raises: 3 ways   10 ea 10 ea                 Cross over step up                       S/l abd    3x10  30                 Clam shells    3x10  30                 Gastroc/soleus stretch    30" ea  30" ea                 SLB    3' 3'                                                                                                                  Check hip strength                       Casting    x                                                                                                                                                                                                                                           Modalities                                                                              Assessment: Tolerated treatment well  Patient exhibited good technique with therapeutic exercises      Plan: Continue per plan of care

## 2018-09-21 ENCOUNTER — APPOINTMENT (OUTPATIENT)
Dept: PHYSICAL THERAPY | Facility: CLINIC | Age: 43
End: 2018-09-21
Payer: COMMERCIAL

## 2018-09-24 ENCOUNTER — OFFICE VISIT (OUTPATIENT)
Dept: PHYSICAL THERAPY | Facility: CLINIC | Age: 43
End: 2018-09-24
Payer: COMMERCIAL

## 2018-09-24 DIAGNOSIS — M76.822 POSTERIOR TIBIAL TENDINITIS OF LEFT LEG: Primary | ICD-10-CM

## 2018-09-24 PROCEDURE — 97110 THERAPEUTIC EXERCISES: CPT | Performed by: PHYSICAL THERAPIST

## 2018-09-24 PROCEDURE — 97112 NEUROMUSCULAR REEDUCATION: CPT | Performed by: PHYSICAL THERAPIST

## 2018-09-24 PROCEDURE — 97140 MANUAL THERAPY 1/> REGIONS: CPT | Performed by: PHYSICAL THERAPIST

## 2018-09-24 NOTE — PROGRESS NOTES
Daily Note     Today's date: 2018  Patient name: Anna Marie Mata  : 1975  MRN: [id number]  Referring provider: Cheikh Lo MD  Dx:   Encounter Diagnosis     ICD-10-CM    1  Posterior tibial tendinitis of left leg M76 822                   Subjective: pt reports some soreness from GT  Objective: See treatment diary below  Precautions: none     Daily Treatment Diary         Manuals                GT post tib tendon 6'  6'  6'  nv               Hallux PROM    3'  3'  3'               Laser post tib 4'  4'  4'  5'                                       Exercise Diary                        tband inv/ev, CC inv    bl x30  b 3x10  b 3x10               Heel raises: 3 ways   10 ea 10 ea  10 ea               Cross over step up                       S/l abd    3x10  30  30               Clam shells    3x10  30  30               Gastroc/soleus stretch    30" ea  30" ea  30"               SLB    3' 3'   3'                                                                                                               Check hip strength                       Casting    x                                                                                                                                                                                                                                           Modalities                                                                              Assessment: Tolerated treatment well  Patient exhibited good technique with therapeutic exercises      Plan: Continue per plan of care

## 2018-09-26 ENCOUNTER — APPOINTMENT (OUTPATIENT)
Dept: PHYSICAL THERAPY | Facility: CLINIC | Age: 43
End: 2018-09-26
Payer: COMMERCIAL

## 2018-10-01 ENCOUNTER — APPOINTMENT (OUTPATIENT)
Dept: PHYSICAL THERAPY | Facility: CLINIC | Age: 43
End: 2018-10-01
Payer: COMMERCIAL

## 2018-10-02 ENCOUNTER — OFFICE VISIT (OUTPATIENT)
Dept: PHYSICAL THERAPY | Facility: CLINIC | Age: 43
End: 2018-10-02
Payer: COMMERCIAL

## 2018-10-02 DIAGNOSIS — M76.822 POSTERIOR TIBIAL TENDINITIS OF LEFT LEG: Primary | ICD-10-CM

## 2018-10-02 PROCEDURE — 97140 MANUAL THERAPY 1/> REGIONS: CPT | Performed by: PHYSICAL THERAPIST

## 2018-10-02 PROCEDURE — 97112 NEUROMUSCULAR REEDUCATION: CPT | Performed by: PHYSICAL THERAPIST

## 2018-10-02 PROCEDURE — 97110 THERAPEUTIC EXERCISES: CPT | Performed by: PHYSICAL THERAPIST

## 2018-10-02 NOTE — PROGRESS NOTES
Daily Note     Today's date: 10/2/2018  Patient name: Danilo Power  : 1975  MRN: [id number]  Referring provider: Abdullahi Baeza MD  Dx:   Encounter Diagnosis     ICD-10-CM    1  Posterior tibial tendinitis of left leg M76 822                   Subjective: pt reports feeling stronger in ankles and hips  Ran > 3 miles with only c/o tightness  Objective: See treatment diary below  Precautions: none     Daily Treatment Diary         Manuals 9/5 9/13  9/19  9/24  10/2             GT post tib tendon 6'  6'  6'  nv  6'             Hallux PROM    3'  3'  3'  2'             Laser post tib 4'  4'  4'  5'  4'                                     Exercise Diary                        tband inv/ev, CC inv    bl x30  b 3x10  b 3x10  b 3x10             Heel raises: 3 ways   10 ea 10 ea  10 ea  10 ea             Cross over step up                       S/l abd    3x10  30  30  30             Clam shells    3x10  30  30  30             Gastroc/soleus stretch    30" ea  30" ea  30"  30             SLB    3' 3'   3'  3;                                                                                                             Check hip strength                       Casting    x                    dispensed orth         x                                                                                                                                                                                                             Modalities                                                                          Mild TTP post tib tendon    Assessment: Tolerated treatment well  Patient exhibited good technique with therapeutic exercises   Reported comfort with orthotics      Plan: Continue per plan of care

## 2018-10-03 ENCOUNTER — APPOINTMENT (OUTPATIENT)
Dept: PHYSICAL THERAPY | Facility: CLINIC | Age: 43
End: 2018-10-03
Payer: COMMERCIAL

## 2018-10-08 ENCOUNTER — APPOINTMENT (OUTPATIENT)
Dept: PHYSICAL THERAPY | Facility: CLINIC | Age: 43
End: 2018-10-08
Payer: COMMERCIAL

## 2018-10-26 DIAGNOSIS — G43.009 MIGRAINE WITHOUT AURA AND WITHOUT STATUS MIGRAINOSUS, NOT INTRACTABLE: ICD-10-CM

## 2018-10-26 RX ORDER — SUMATRIPTAN 50 MG/1
TABLET, FILM COATED ORAL
Qty: 9 TABLET | Refills: 3 | Status: SHIPPED | OUTPATIENT
Start: 2018-10-26 | End: 2019-02-08 | Stop reason: SDUPTHER

## 2018-11-07 ENCOUNTER — OFFICE VISIT (OUTPATIENT)
Dept: FAMILY MEDICINE CLINIC | Facility: CLINIC | Age: 43
End: 2018-11-07
Payer: COMMERCIAL

## 2018-11-07 VITALS
WEIGHT: 106 LBS | RESPIRATION RATE: 14 BRPM | BODY MASS INDEX: 20.01 KG/M2 | DIASTOLIC BLOOD PRESSURE: 78 MMHG | HEART RATE: 76 BPM | TEMPERATURE: 96.8 F | HEIGHT: 61 IN | SYSTOLIC BLOOD PRESSURE: 106 MMHG

## 2018-11-07 DIAGNOSIS — R39.9 UTI SYMPTOMS: Primary | ICD-10-CM

## 2018-11-07 LAB
BACTERIA UR QL AUTO: NORMAL /HPF
BILIRUB UR QL STRIP: NEGATIVE
CLARITY UR: CLEAR
COLOR UR: YELLOW
GLUCOSE UR STRIP-MCNC: NEGATIVE MG/DL
HGB UR QL STRIP.AUTO: NEGATIVE
HYALINE CASTS #/AREA URNS LPF: NORMAL /LPF
KETONES UR STRIP-MCNC: NEGATIVE MG/DL
LEUKOCYTE ESTERASE UR QL STRIP: ABNORMAL
NITRITE UR QL STRIP: NEGATIVE
NON-SQ EPI CELLS URNS QL MICRO: NORMAL /HPF
PH UR STRIP.AUTO: 7 [PH] (ref 4.5–8)
PROT UR STRIP-MCNC: NEGATIVE MG/DL
RBC #/AREA URNS AUTO: NORMAL /HPF
SL AMB  POCT GLUCOSE, UA: NEGATIVE
SL AMB LEUKOCYTE ESTERASE,UA: NEGATIVE
SL AMB POCT BILIRUBIN,UA: NEGATIVE
SL AMB POCT BLOOD,UA: NEGATIVE
SL AMB POCT CLARITY,UA: CLEAR
SL AMB POCT COLOR,UA: YELLOW
SL AMB POCT KETONES,UA: NEGATIVE
SL AMB POCT NITRITE,UA: NEGATIVE
SL AMB POCT PH,UA: 7
SL AMB POCT SPECIFIC GRAVITY,UA: 1
SL AMB POCT URINE PROTEIN: NEGATIVE
SL AMB POCT UROBILINOGEN: 0.2
SP GR UR STRIP.AUTO: 1.01 (ref 1–1.03)
UROBILINOGEN UR QL STRIP.AUTO: 0.2 E.U./DL
WBC #/AREA URNS AUTO: NORMAL /HPF

## 2018-11-07 PROCEDURE — 81001 URINALYSIS AUTO W/SCOPE: CPT | Performed by: NURSE PRACTITIONER

## 2018-11-07 PROCEDURE — 81002 URINALYSIS NONAUTO W/O SCOPE: CPT | Performed by: NURSE PRACTITIONER

## 2018-11-07 PROCEDURE — 99213 OFFICE O/P EST LOW 20 MIN: CPT | Performed by: NURSE PRACTITIONER

## 2018-11-07 PROCEDURE — 87086 URINE CULTURE/COLONY COUNT: CPT | Performed by: NURSE PRACTITIONER

## 2018-11-07 RX ORDER — NITROFURANTOIN 25; 75 MG/1; MG/1
100 CAPSULE ORAL 2 TIMES DAILY
Qty: 14 CAPSULE | Refills: 0 | Status: SHIPPED | OUTPATIENT
Start: 2018-11-07 | End: 2018-11-14

## 2018-11-07 RX ORDER — BIOTIN 1 MG
1 TABLET ORAL EVERY OTHER DAY
COMMUNITY
End: 2021-04-05

## 2018-11-07 NOTE — PATIENT INSTRUCTIONS

## 2018-11-07 NOTE — PROGRESS NOTES
FAMILY PRACTICE OFFICE VISIT       NAME: Pio Nettles  AGE: 43 y o  SEX: female       : 1975        MRN: [id number]    DATE: 2018  TIME: 1:26 PM    Assessment and Plan     Problem List Items Addressed This Visit     None      Visit Diagnoses     UTI symptoms    -  Primary    Relevant Medications    nitrofurantoin (MACROBID) 100 mg capsule    Other Relevant Orders    POCT urine dip (Completed)    Urine culture    UA (URINE) with reflex to Microscopic            Patient Instructions     Urinary Tract Infection in Women   WHAT YOU NEED TO KNOW:   A urinary tract infection (UTI) is caused by bacteria that get inside your urinary tract  Most bacteria that enter your urinary tract come out when you urinate  If the bacteria stay in your urinary tract, you may get an infection  Your urinary tract includes your kidneys, ureters, bladder, and urethra  Urine is made in your kidneys, and it flows from the ureters to the bladder  Urine leaves the bladder through the urethra  A UTI is more common in your lower urinary tract, which includes your bladder and urethra  DISCHARGE INSTRUCTIONS:   Return to the emergency department if:   · You are urinating very little or not at all  · You have a high fever with shaking chills  · You have side or back pain that gets worse  Contact your healthcare provider if:   · You have a fever  · You do not feel better after 2 days of taking antibiotics  · You are vomiting  · You have questions or concerns about your condition or care  Medicines:   · Antibiotics  help fight a bacterial infection  · Medicines  may be given to decrease pain and burning when you urinate  They will also help decrease the feeling that you need to urinate often  These medicines will make your urine orange or red  · Take your medicine as directed  Contact your healthcare provider if you think your medicine is not helping or if you have side effects   Tell him or her if you are allergic to any medicine  Keep a list of the medicines, vitamins, and herbs you take  Include the amounts, and when and why you take them  Bring the list or the pill bottles to follow-up visits  Carry your medicine list with you in case of an emergency  Follow up with your healthcare provider as directed:  Write down your questions so you remember to ask them during your visits  Prevent another UTI:   · Empty your bladder often  Urinate and empty your bladder as soon as you feel the need  Do not hold your urine for long periods of time  · Wipe from front to back after you urinate or have a bowel movement  This will help prevent germs from getting into your urinary tract through your urethra  · Drink liquids as directed  Ask how much liquid to drink each day and which liquids are best for you  You may need to drink more liquids than usual to help flush out the bacteria  Do not drink alcohol, caffeine, or citrus juices  These can irritate your bladder and increase your symptoms  Your healthcare provider may recommend cranberry juice to help prevent a UTI  · Urinate after you have sex  This can help flush out bacteria passed during sex  · Do not douche or use feminine deodorants  These can change the chemical balance in your vagina  · Change sanitary pads or tampons often  This will help prevent germs from getting into your urinary tract  · Do pelvic muscle exercises often  Pelvic muscle exercises may help you start and stop urinating  Strong pelvic muscles may help you empty your bladder easier  Squeeze these muscles tightly for 5 seconds like you are trying to hold back urine  Then relax for 5 seconds  Gradually work up to squeezing for 10 seconds  Do 3 sets of 15 repetitions a day, or as directed  © 2017 [employer] Information is for End User's use only and may not be sold, redistributed or otherwise used for commercial purposes   All illustrations and images included in [facility] are the copyrighted property of [employer]  or [employer]  The above information is an  only  It is not intended as medical advice for individual conditions or treatments  Talk to your doctor, nurse or pharmacist before following any medical regimen to see if it is safe and effective for you  1  UTI symptoms  POCT urine dip    Urine culture    UA (URINE) with reflex to Microscopic    nitrofurantoin (MACROBID) 100 mg capsule     Patient presents today with symptoms of suprapubic pressure, urinary frequency, and urgency for the past 2 days  In office urine dip is clear  She denies any vaginal discharge, itching, irritation, odor, abnormal bleeding or menses  Will send urine culture and UA for laboratory analysis  Prescription provided for Macrobid 100 mg twice daily for 7 days, she will hold off on taking this at this time given in office clear urine dip, and will monitor symptoms  If any worsening of symptoms or if symptoms are not improving over the next 1-2 days, she will begin Macrobid while culture is pending  If any worsening of symptoms, she is instructed to call  If any development of nausea, vomiting, fevers, chills or back pain, these are signs of a kidney infection instructed to call the office or go to the emergency room  Office will call her with results of urinalysis and culture, and will discuss further plan of care at that time  Chief Complaint     Chief Complaint   Patient presents with    Urinary Tract Infection     Patient is here c/o urianry frequency and lower abdominal pressure x's 2+ days  History of Present Illness     Pradeep Reyna (Farhanaradha Courtneyer) Helen Davila is a 43year old female presenting today for symptoms concerning for a UTI  Two days ago she started with suprapubic pressure, increased urinary frequency and urgency  Denies fevers, chills, nausea, vomiting, or back pain  Has never had a UTI in the past  Menses are regular  Denies any abnormal vaginal discharge, itching, irritation, or odor  Review of Systems   Review of Systems   Constitutional: Negative for chills, diaphoresis, fatigue and fever  Gastrointestinal: Negative  Genitourinary: Positive for frequency, pelvic pain (Pressure) and urgency  Negative for difficulty urinating, dysuria, flank pain, genital sores, hematuria, menstrual problem, vaginal bleeding, vaginal discharge and vaginal pain  Active Problem List     Patient Active Problem List   Diagnosis    Hypothyroidism    Abnormal MRI of head    Hearing loss    Headache, variant migraine       Past Medical History:  Past Medical History:   Diagnosis Date    Cluster headache        Past Surgical History:  Past Surgical History:   Procedure Laterality Date    HERNIA REPAIR Left 09/2014    Sports Hernia Repair    WRIST SURGERY Right 03/2009    Dr Lainey Coley       Family History:  Family History   Problem Relation Age of Onset    Hypothyroidism Mother     Migraines Mother     Hyperlipidemia Father         Pure    Ovarian cancer Paternal Grandmother        Social History:  Social History     Social History    Marital status: /Civil Union     Spouse name: N/A    Number of children: N/A    Years of education: N/A     Occupational History    Not on file  Social History Main Topics    Smoking status: Never Smoker    Smokeless tobacco: Never Used    Alcohol use Yes      Comment: Social     Drug use: No    Sexual activity: Not on file     Other Topics Concern    Not on file     Social History Narrative    Caffeine use           I have reviewed the patient's medical history in detail; there are no changes to the history as noted in the electronic medical record      Objective     Vitals:    11/07/18 1100   BP: 106/78   Pulse: 76   Resp: 14   Temp: (!) 96 8 °F (36 °C)   TempSrc: Tympanic   Weight: 48 1 kg (106 lb)   Height: 5' 1" (1 549 m)     Wt Readings from Last 3 Encounters:   11/07/18 48 1 kg (106 lb)   03/20/18 47 6 kg (105 lb)   03/20/18 48 1 kg (106 lb)     Body mass index is 20 03 kg/m²  PHQ-9 Depression Screening    PHQ-9:    Frequency of the following problems over the past two weeks:            Physical Exam   Constitutional: She appears well-developed and well-nourished  No distress  HENT:   Head: Normocephalic and atraumatic  Eyes: Conjunctivae are normal    Neck: Normal range of motion  Neck supple  Cardiovascular: Normal rate, regular rhythm and normal heart sounds  No murmur heard  Pulmonary/Chest: Effort normal and breath sounds normal    Abdominal: Soft  Bowel sounds are normal  There is tenderness in the epigastric area and suprapubic area  There is no CVA tenderness  Psychiatric: She has a normal mood and affect  Nursing note and vitals reviewed  ALLERGIES:  Allergies   Allergen Reactions    Codeine      Other reaction(s): Palpitations  Reaction Date: 03Aug2011;     Fluticasone      Action Taken: nose bleeds;        Current Medications     Current Outpatient Prescriptions   Medication Sig Dispense Refill    Cholecalciferol (VITAMIN D3) 1000 units CAPS Take 1 capsule by mouth daily      EPINEPHrine (EPIPEN 2-ABDOUL) 0 3 mg/0 3 mL SOAJ Inject 0 3 mg as directed as needed        levothyroxine 75 mcg tablet TAKE 1 TABLET BY MOUTH EVERY DAY 90 tablet 2    LO LOESTRIN FE 1 MG-10 MCG / 10 MCG TABS Take 1 tablet by mouth daily        Loratadine (CLARITIN) 10 MG CAPS Take by mouth      multivitamin (THERAGRAN) TABS Take 1 tablet by mouth daily      nitrofurantoin (MACROBID) 100 mg capsule Take 1 capsule (100 mg total) by mouth 2 (two) times a day for 7 days 14 capsule 0    SUMAtriptan (IMITREX) 50 mg tablet TAKE 1 TABLET AT ONSET OF MIGRAINE  MAY REPEAT ONCE IN 2 HOURS IF NEEDED  9 tablet 3     No current facility-administered medications for this visit            Health Maintenance     Health Maintenance   Topic Date Due    PAP SMEAR 12/12/1996    PT PLAN OF CARE  10/06/2018    INFLUENZA VACCINE  12/07/2018 (Originally 7/1/2018)    MAMMOGRAM  03/16/2019    Depression Screening PHQ  09/05/2019    DTaP,Tdap,and Td Vaccines (2 - Td) 09/18/2023     Immunization History   Administered Date(s) Administered    Tdap 09/18/2013       MARLIN RamosNP

## 2018-11-08 LAB — BACTERIA UR CULT: NORMAL

## 2018-11-09 ENCOUNTER — TELEPHONE (OUTPATIENT)
Dept: FAMILY MEDICINE CLINIC | Facility: CLINIC | Age: 43
End: 2018-11-09

## 2018-11-09 NOTE — TELEPHONE ENCOUNTER
----- Message from [id number] joe sent at 11/8/2018  9:37 PM EST -----  Please contact patient  Her urine culture is negative for infection  She can stop taking the antibiotics  Please have her call if she has persistent symptoms

## 2018-11-09 NOTE — PROGRESS NOTES
Please contact patient  Her urine culture is negative for infection  She can stop taking the antibiotics  Please have her call if she has persistent symptoms

## 2019-02-08 ENCOUNTER — OFFICE VISIT (OUTPATIENT)
Dept: NEUROLOGY | Facility: CLINIC | Age: 44
End: 2019-02-08
Payer: COMMERCIAL

## 2019-02-08 VITALS
DIASTOLIC BLOOD PRESSURE: 55 MMHG | BODY MASS INDEX: 19.63 KG/M2 | SYSTOLIC BLOOD PRESSURE: 110 MMHG | WEIGHT: 104 LBS | HEIGHT: 61 IN | HEART RATE: 78 BPM

## 2019-02-08 DIAGNOSIS — R93.0 ABNORMAL MRI OF HEAD: Primary | ICD-10-CM

## 2019-02-08 DIAGNOSIS — G43.809 HEADACHE, VARIANT MIGRAINE: ICD-10-CM

## 2019-02-08 DIAGNOSIS — G43.009 MIGRAINE WITHOUT AURA AND WITHOUT STATUS MIGRAINOSUS, NOT INTRACTABLE: ICD-10-CM

## 2019-02-08 PROCEDURE — 99214 OFFICE O/P EST MOD 30 MIN: CPT | Performed by: PHYSICIAN ASSISTANT

## 2019-02-08 RX ORDER — SUMATRIPTAN 50 MG/1
TABLET, FILM COATED ORAL
Qty: 9 TABLET | Refills: 3 | Status: SHIPPED | OUTPATIENT
Start: 2019-02-08 | End: 2019-10-27 | Stop reason: SDUPTHER

## 2019-02-08 NOTE — PROGRESS NOTES
Patient ID: Carolynn Guzman is a 37 y o  female  Assessment/Plan:    Headache, variant migraine  Migraines currently well controlled  She typically gets menstrual migraines and is on a BCP that she takes for 3 months and then off for 1 month to have a period  GYN follows closely  Patient uses Imitrex sparingly as an abortive and this works well for her  Sinus congestion is also a trigger for migraines and this has been more problematic lately  She has an appt with PCP to discuss her congestion  Will cont Imitrex as an abortive, no need for preventative at this point due to low frequency of migraines    Abnormal MRI of head  Patient with history of abnormal MRI brain, which was done due to hearing loss  She has non-specific WM changes in the frontal lobes, which is most likely due to her migraine history  These WM changes would not correlate to her hearing loss (that occurred back in 2013)  She follows with ENT and audiology now  Last MRI brain done 2/2017 and stable compared to 2014  Will repeat MRI brain in 1 year  Order placed today  Will see patient back in 1 year or sooner if needed  She was advised to call for any new or worsening symptoms  Diagnoses and all orders for this visit:    Abnormal MRI of head  -     MRI brain with and without contrast; Future    Headache, variant migraine    Migraine without aura and without status migrainosus, not intractable  -     SUMAtriptan (IMITREX) 50 mg tablet; Take 1 tab at onset of migraine, may repeat x 1 in 2 hours if needed           Subjective:    HPI    Patient is a 37year old female who presents for neurological follow up, last seen in March 2018  Patient was initially referred for follow up of abnormal MRI of the brain  In 2013, she developed sudden onset of left hearing loss  She continues to follow with ENT and audiology  Patient had initial MRI brain done in Feb 2013 with non-specific WM changes, predominantly in the frontal lobes  Patient with history of migraines as well  Patient had cord imaging which was negative for any cord lesions  MRI brain has been followed for any changes  MRI has been stable, last done 2/1/17 compared to 9/2014  Stable MR appearance of the brain  Non-specific WM changes right greater than left frontal lobes are stable  Query complicated migraine  Today, patient reports she is doing well   she denies any new neurological symptoms  Migraines have been well controlled  GYN follows her oral contraceptive pill that she takes for 3 months and then off for one month to have a period  She does not have any migraines during the months she is taking the pill  When she has her period quarterly, she typically gets a migraine  The Imitrex has been very helpful for an abortive  She is not on a preventative  Sinus congestion is also a migraine trigger  Denies vision changes, vertigo  No difficulty with speech or swallowing, no weakness  No bowel or bladder changes  The following portions of the patient's history were reviewed and updated as appropriate: current medications, past family history, past medical history, past social history, past surgical history and problem list          Objective:    Blood pressure 110/55, pulse 78, height 5' 1" (1 549 m), weight 47 2 kg (104 lb)  Physical Exam   Constitutional: She appears well-developed and well-nourished  HENT:   Head: Normocephalic and atraumatic  Eyes: Pupils are equal, round, and reactive to light  EOM are normal    Cardiovascular: Intact distal pulses  Neurological: She has normal strength and normal reflexes  Coordination normal    Skin: Skin is warm and dry  Psychiatric: She has a normal mood and affect  Her speech is normal        Neurological Exam  Mental Status   Oriented to person, place, time and situation  Recent and remote memory are intact  Speech is normal  Language is fluent with no aphasia   Attention and concentration are normal     Cranial Nerves  CN II: Visual fields full to confrontation  CN III, IV, VI: Extraocular movements intact bilaterally  Pupils equal round and reactive to light bilaterally  CN V: Facial sensation is normal   CN VII: Full and symmetric facial movement  CN VIII: Hearing is normal   CN IX, X: Palate elevates symmetrically  Normal gag reflex  CN XI: Shoulder shrug strength is normal   CN XII: Tongue midline without atrophy or fasciculations  Motor   Normal muscle tone  Strength is 5/5 throughout all four extremities  Sensory  Sensation is intact to light touch, pinprick, vibration and proprioception in all four extremities  Reflexes  Deep tendon reflexes are 2+ and symmetric in all four extremities with downgoing toes bilaterally  Coordination  Finger-to-nose, rapid alternating movements and heel-to-shin normal bilaterally without dysmetria  Gait  Casual gait is normal including stance, stride, and arm swing  ROS:    Review of Systems   Constitutional: Negative  Negative for appetite change and fever  HENT: Positive for congestion, sinus pain and sinus pressure  Negative for hearing loss, tinnitus, trouble swallowing and voice change  Eyes: Negative  Negative for photophobia and pain  Respiratory: Negative  Negative for shortness of breath  Cardiovascular: Negative  Negative for palpitations  Gastrointestinal: Negative  Negative for nausea and vomiting  Endocrine: Negative  Negative for cold intolerance and heat intolerance  Genitourinary: Negative  Negative for dysuria, frequency and urgency  Musculoskeletal: Positive for back pain  Negative for myalgias and neck pain  Skin: Negative  Negative for rash  Neurological: Negative  Negative for dizziness, tremors, seizures, syncope, facial asymmetry, speech difficulty, weakness, light-headedness, numbness and headaches  Hematological: Negative  Does not bruise/bleed easily     Psychiatric/Behavioral: Negative  Negative for confusion, hallucinations and sleep disturbance       I personally reviewed and updated the ROS as appropriate

## 2019-02-08 NOTE — PATIENT INSTRUCTIONS
Continue sumatriptan when needed for migraines    Discuss the congestion with PCP, as getting that under control may also help control migraines  Will repeat brain MRI in 1 year  Follow up after MRI next year

## 2019-02-08 NOTE — ASSESSMENT & PLAN NOTE
Migraines currently well controlled  She typically gets menstrual migraines and is on a BCP that she takes for 3 months and then off for 1 month to have a period  GYN follows closely  Patient uses Imitrex sparingly as an abortive and this works well for her  Sinus congestion is also a trigger for migraines and this has been more problematic lately  She has an appt with PCP to discuss her congestion      Will cont Imitrex as an abortive, no need for preventative at this point due to low frequency of migraines

## 2019-02-08 NOTE — ASSESSMENT & PLAN NOTE
Patient with history of abnormal MRI brain, which was done due to hearing loss  She has non-specific WM changes in the frontal lobes, which is most likely due to her migraine history  These WM changes would not correlate to her hearing loss (that occurred back in 2013)  She follows with ENT and audiology now  Last MRI brain done 2/2017 and stable compared to 2014  Will repeat MRI brain in 1 year  Order placed today

## 2019-02-18 ENCOUNTER — OFFICE VISIT (OUTPATIENT)
Dept: FAMILY MEDICINE CLINIC | Facility: CLINIC | Age: 44
End: 2019-02-18
Payer: COMMERCIAL

## 2019-02-18 VITALS
BODY MASS INDEX: 20.69 KG/M2 | WEIGHT: 109.6 LBS | RESPIRATION RATE: 14 BRPM | HEART RATE: 80 BPM | HEIGHT: 61 IN | TEMPERATURE: 97.5 F | DIASTOLIC BLOOD PRESSURE: 58 MMHG | SYSTOLIC BLOOD PRESSURE: 106 MMHG

## 2019-02-18 DIAGNOSIS — H90.5 SENSORINEURAL HEARING LOSS (SNHL) OF LEFT EAR, UNSPECIFIED HEARING STATUS ON CONTRALATERAL SIDE: ICD-10-CM

## 2019-02-18 DIAGNOSIS — Z00.00 WELL ADULT EXAM: Primary | ICD-10-CM

## 2019-02-18 DIAGNOSIS — G43.809 HEADACHE, VARIANT MIGRAINE: ICD-10-CM

## 2019-02-18 DIAGNOSIS — Z01.818 PRE-OP EXAM: ICD-10-CM

## 2019-02-18 DIAGNOSIS — J32.9 RECURRENT SINUSITIS: ICD-10-CM

## 2019-02-18 DIAGNOSIS — Z12.39 SCREENING FOR MALIGNANT NEOPLASM OF BREAST: ICD-10-CM

## 2019-02-18 DIAGNOSIS — E03.9 HYPOTHYROIDISM, UNSPECIFIED TYPE: ICD-10-CM

## 2019-02-18 PROCEDURE — 99396 PREV VISIT EST AGE 40-64: CPT | Performed by: FAMILY MEDICINE

## 2019-02-18 NOTE — PROGRESS NOTES
FAMILY PRACTICE OFFICE VISIT       NAME: Franki Nettles  AGE: 37 y o  SEX: female       : 1975        MRN: [id number]        Assessment and Plan     Problem List Items Addressed This Visit        Endocrine    Hypothyroidism    Relevant Orders    Lipid panel    TSH, 3rd generation    Hepatic function panel       Cardiovascular and Mediastinum    Headache, variant migraine    Relevant Orders    Hepatic function panel       Nervous and Auditory    Hearing loss    Relevant Orders    Ambulatory Referral to Otolaryngology      Other Visit Diagnoses     Well adult exam    -  Primary    Pre-op exam        Screening for malignant neoplasm of breast        Relevant Orders    Mammo screening bilateral w 3d & cad    Recurrent sinusitis        Relevant Orders    Ambulatory Referral to Otolaryngology       Patient presents for annual well exam/preop evaluation prior to left ankle surgery  She is acceptable risk for planned surgical procedure  Patient will proceed with additional blood work as outlined above  She remains on levothyroxine for hypothyroidism  Patient is under care of [facility] for surveillance of nonspecific white matter changes on brain MRI and chronic headaches, her symptoms have improved on low continuous oral contraceptive treatment  Referral to ENT for re-evaluation of chronic hearing loss  Patient remains on medications for allergic rhinitis  She is due for mammography and is up-to-date with gyn exam   Follow up pending labs, updates, annually and as needed      There are no Patient Instructions on file for this visit  M*Modal software was used to dictate this note  It may contain errors with dictating incorrect words/spelling  Please contact provider directly with any questions         Chief Complaint     Chief Complaint   Patient presents with    Pre-op Exam     Patient is here for a preoperative evaluation for right ankle surgery dated 3/1/19 with Coordinated Kristi Yates Sada Monosn MD surgeon   Sinus Problem       History of Present Illness       Patient presents for preop examination    Dr Reilly - [facility]    Ankle  Left - arthroscopy -due to persistent pain and possible " mini tears"    Ongoing  Pain left  Left  Ankle    Patient  Had MRI and was treated with PT  She denies chest pain, palpitations, shortness of breath or dizziness  Patient is  UTD with  GYN exam     Pending  mammo    UTD with  Neurology, patient is following up for surveillance of nonspecific white matter changes, DR Tashi Carey for 1 year- - will be due for brain  MRI till 2020      Migraines have  Improved,  BCP ( lo-Loestrin 10/10 mcg) , patient uses oral contraceptive back to back for 3 cycles- then 1 week OFF    I reviewed results of pre-admission testing performed at [facility] ([facility])  Normal CBC and BMP  EKG tracing is ear poor quality, interpretation by cardiologist states that EKG is normal, incomplete right bundle branch block    Patient was under care of  ENT, chronic hearing loss, reportedly stable, she needs to schedule appointment for re-evaluation and is requesting a new  referral      Sinus Problem   Associated symptoms include congestion and headaches  Review of Systems   Review of Systems   Constitutional: Negative  HENT: Positive for congestion, hearing loss and postnasal drip  Eyes: Negative  Respiratory: Negative  Cardiovascular: Negative  Gastrointestinal: Negative  Endocrine: Negative  Genitourinary: Negative  Musculoskeletal: Positive for arthralgias (left ankle  pain)  Skin: Negative  Allergic/Immunologic: Negative  Neurological: Positive for headaches  Hematological: Negative  Psychiatric/Behavioral: Negative          Active Problem List     Patient Active Problem List   Diagnosis    Hypothyroidism    Abnormal MRI of head    Hearing loss    Headache, variant migraine       Past Medical History:  Past Medical History:   Diagnosis Date  Cluster headache        Past Surgical History:  Past Surgical History:   Procedure Laterality Date    HERNIA REPAIR Left 09/2014    Sports Hernia Repair    WRIST SURGERY Right 03/2009    Dr Julio C Shane       Family History:  Family History   Problem Relation Age of Onset    Hypothyroidism Mother     Migraines Mother     Hyperlipidemia Father         Pure    Ovarian cancer Paternal Grandmother        Social History:  Social History     Socioeconomic History    Marital status: /Civil Union     Spouse name: Not on file    Number of children: Not on file    Years of education: Not on file    Highest education level: Not on file   Occupational History    Not on file   Social Needs    Financial resource strain: Not on file    Food insecurity:     Worry: Not on file     Inability: Not on file    Transportation needs:     Medical: Not on file     Non-medical: Not on file   Tobacco Use    Smoking status: Never Smoker    Smokeless tobacco: Never Used   Substance and Sexual Activity    Alcohol use: Yes     Comment: Social     Drug use: No    Sexual activity: Not on file   Lifestyle    Physical activity:     Days per week: Not on file     Minutes per session: Not on file    Stress: Not on file   Relationships    Social connections:     Talks on phone: Not on file     Gets together: Not on file     Attends [employer] service: Not on file     Active member of club or organization: Not on file     Attends meetings of clubs or organizations: Not on file     Relationship status: Not on file    Intimate partner violence:     Fear of current or ex partner: Not on file     Emotionally abused: Not on file     Physically abused: Not on file     Forced sexual activity: Not on file   Other Topics Concern    Not on file   Social History Narrative    Caffeine use       Objective     Vitals:    02/18/19 0743   BP: 106/58   Pulse: 80   Resp: 14   Temp: 97 5 °F (36 4 °C)   TempSrc: Tympanic   Weight: 49 7 kg (109 lb 9 6 oz)   Height: 5' 1" (1 549 m)     Wt Readings from Last 3 Encounters:   02/18/19 49 7 kg (109 lb 9 6 oz)   02/08/19 47 2 kg (104 lb)   11/07/18 48 1 kg (106 lb)       Physical Exam   Constitutional: She is oriented to person, place, and time  She appears well-developed and well-nourished  HENT:   Head: Normocephalic and atraumatic  Mouth/Throat: Oropharynx is clear and moist    Eyes: Pupils are equal, round, and reactive to light  Conjunctivae and EOM are normal    Neck: Normal range of motion  Neck supple  No thyromegaly present  Cardiovascular: Normal rate and regular rhythm  No murmur heard  Pulmonary/Chest: Effort normal and breath sounds normal    Abdominal: Soft  Bowel sounds are normal    Musculoskeletal: Normal range of motion  Lymphadenopathy:     She has no cervical adenopathy  Neurological: She is alert and oriented to person, place, and time  Skin: No rash noted  Psychiatric: She has a normal mood and affect         Pertinent Laboratory/Diagnostic Studies:  Lab Results   Component Value Date    GLUCOSE 72 11/02/2015    BUN 10 06/04/2018    CREATININE 0 80 06/04/2018    CALCIUM 8 8 06/04/2018     10/19/2016    K 4 1 06/04/2018    CO2 24 06/04/2018     06/04/2018     Lab Results   Component Value Date    ALT 19 06/04/2018    AST 12 06/04/2018    ALKPHOS 40 (L) 06/04/2018    BILITOT 0 4 10/19/2016       Lab Results   Component Value Date    WBC 5 71 06/04/2018    HGB 13 0 06/04/2018    HCT 40 7 06/04/2018    MCV 98 06/04/2018     06/04/2018       No results found for: TSH    Lab Results   Component Value Date    CHOL 200 10/19/2016     Lab Results   Component Value Date    TRIG 55 06/04/2018     Lab Results   Component Value Date    HDL 63 (H) 06/04/2018     Lab Results   Component Value Date    LDLCALC 104 (H) 06/04/2018     Lab Results   Component Value Date    HGBA1C 5 5 10/19/2016       Results for orders placed or performed in visit on 11/07/18   Urine culture Result Value Ref Range    Urine Culture No Growth <1000 cfu/mL    UA (URINE) with reflex to Microscopic   Result Value Ref Range    Color, UA Yellow     Clarity, UA Clear     Specific Gravity, UA 1 006 1 003 - 1 030    pH, UA 7 0 4 5 - 8 0    Leukocytes, UA Trace (A) Negative    Nitrite, UA Negative Negative    Protein, UA Negative Negative mg/dl    Glucose, UA Negative Negative mg/dl    Ketones, UA Negative Negative mg/dl    Urobilinogen, UA 0 2 0 2, 1 0 E U /dl E U /dl    Bilirubin, UA Negative Negative    Blood, UA Negative Negative   Urine Microscopic   Result Value Ref Range    RBC, UA None Seen None Seen, 0-5 /hpf    WBC, UA None Seen None Seen, 0-5, 5-55, 5-65 /hpf    Epithelial Cells None Seen None Seen, Occasional /hpf    Bacteria, UA None Seen None Seen, Occasional /hpf    Hyaline Casts, UA None Seen None Seen /lpf   POCT urine dip   Result Value Ref Range    LEUKOCYTE ESTERASE,UA negative     NITRITE,UA negative     SL AMB POCT UROBILINOGEN 0 2     POCT URINE PROTEIN negative      PH,UA 7 0     BLOOD,UA negative     SPECIFIC GRAVITY,UA 1 005     KETONES,UA negative     BILIRUBIN,UA negative     GLUCOSE, UA negative      COLOR,UA yellow     CLARITY,UA clear        Orders Placed This Encounter   Procedures    Mammo screening bilateral w 3d & cad    Lipid panel    TSH, 3rd generation    Hepatic function panel    Ambulatory Referral to Otolaryngology       ALLERGIES:  Allergies   Allergen Reactions    Codeine      Other reaction(s): Palpitations  Reaction Date: 03Aug2011;        Current Medications     Current Outpatient Medications   Medication Sig Dispense Refill    Cholecalciferol (VITAMIN D3) 1000 units CAPS Take 1 capsule by mouth every other day       EPINEPHrine (EPIPEN 2-ABDOUL) 0 3 mg/0 3 mL SOAJ Inject 0 3 mg as directed as needed        levothyroxine 75 mcg tablet TAKE 1 TABLET BY MOUTH EVERY DAY 90 tablet 2    LO LOESTRIN FE 1 MG-10 MCG / 10 MCG TABS Take 1 tablet by mouth daily        Loratadine (CLARITIN) 10 MG CAPS Take by mouth      multivitamin (THERAGRAN) TABS Take 1 tablet by mouth daily      SUMAtriptan (IMITREX) 50 mg tablet Take 1 tab at onset of migraine, may repeat x 1 in 2 hours if needed 9 tablet 3     No current facility-administered medications for this visit            Health Maintenance     Health Maintenance   Topic Date Due    INFLUENZA VACCINE  07/01/2018    PT PLAN OF CARE  10/06/2018    MAMMOGRAM  03/16/2019    Depression Screening PHQ  09/05/2019    BMI: Adult  02/18/2020    PAP SMEAR  10/22/2020    DTaP,Tdap,and Td Vaccines (2 - Td) 09/18/2023    HEPATITIS B VACCINES  Aged Out     Immunization History   Administered Date(s) Administered    Tdap 09/18/2013       Brown Chambers MD

## 2019-03-18 ENCOUNTER — HOSPITAL ENCOUNTER (OUTPATIENT)
Dept: RADIOLOGY | Age: 44
Discharge: HOME | End: 2019-03-18
Payer: COMMERCIAL

## 2019-03-18 VITALS — HEIGHT: 61 IN | WEIGHT: 107 LBS | BODY MASS INDEX: 20.2 KG/M2

## 2019-03-18 DIAGNOSIS — Z12.39 SCREENING FOR MALIGNANT NEOPLASM OF BREAST: ICD-10-CM

## 2019-03-18 PROCEDURE — 77067 SCR MAMMO BI INCL CAD: CPT

## 2019-03-18 PROCEDURE — 77063 BREAST TOMOSYNTHESIS BI: CPT

## 2019-03-29 LAB
ALBUMIN SERPL-MCNC: 4.3 G/DL (ref 3.6–5.1)
ALBUMIN/GLOB SERPL: 1.7 (CALC) (ref 1–2.5)
ALP SERPL-CCNC: 40 U/L (ref 33–115)
ALT SERPL-CCNC: 16 U/L (ref 6–29)
AST SERPL-CCNC: 16 U/L (ref 10–30)
BILIRUB DIRECT SERPL-MCNC: 0.1 MG/DL
BILIRUB INDIRECT SERPL-MCNC: 0.3 MG/DL (CALC) (ref 0.2–1.2)
BILIRUB SERPL-MCNC: 0.4 MG/DL (ref 0.2–1.2)
CHOLEST SERPL-MCNC: 193 MG/DL
CHOLEST/HDLC SERPL: 2.8 (CALC)
GLOBULIN SER CALC-MCNC: 2.5 G/DL (CALC) (ref 1.9–3.7)
HDLC SERPL-MCNC: 70 MG/DL
LDLC SERPL CALC-MCNC: 109 MG/DL (CALC)
NONHDLC SERPL-MCNC: 123 MG/DL (CALC)
PROT SERPL-MCNC: 6.8 G/DL (ref 6.1–8.1)
TRIGL SERPL-MCNC: 62 MG/DL
TSH SERPL-ACNC: 2.7 MIU/L

## 2019-04-11 PROBLEM — J34.2 NASAL SEPTAL DEVIATION: Status: ACTIVE | Noted: 2019-04-11

## 2019-04-29 ENCOUNTER — HOSPITAL ENCOUNTER (OUTPATIENT)
Dept: RADIOLOGY | Facility: HOSPITAL | Age: 44
Discharge: HOME | End: 2019-04-29
Attending: OTOLARYNGOLOGY
Payer: COMMERCIAL

## 2019-04-29 DIAGNOSIS — R09.81 NASAL CONGESTION: ICD-10-CM

## 2019-04-29 DIAGNOSIS — J30.1 NON-SEASONAL ALLERGIC RHINITIS DUE TO POLLEN: ICD-10-CM

## 2019-04-29 DIAGNOSIS — R44.8 FACIAL PRESSURE: ICD-10-CM

## 2019-04-29 DIAGNOSIS — G43.809 HEADACHE, VARIANT MIGRAINE: ICD-10-CM

## 2019-04-29 PROCEDURE — 70486 CT MAXILLOFACIAL W/O DYE: CPT

## 2019-05-25 DIAGNOSIS — E03.9 HYPOTHYROIDISM, UNSPECIFIED TYPE: ICD-10-CM

## 2019-05-25 RX ORDER — LEVOTHYROXINE SODIUM 0.07 MG/1
TABLET ORAL
Qty: 90 TABLET | Refills: 2 | Status: SHIPPED | OUTPATIENT
Start: 2019-05-25 | End: 2020-03-04

## 2019-07-19 ENCOUNTER — TRANSCRIBE ORDERS (OUTPATIENT)
Dept: ADMINISTRATIVE | Facility: HOSPITAL | Age: 44
End: 2019-07-19

## 2019-07-19 DIAGNOSIS — IMO0002 LUMP: Primary | ICD-10-CM

## 2019-07-24 ENCOUNTER — HOSPITAL ENCOUNTER (OUTPATIENT)
Dept: ULTRASOUND IMAGING | Facility: CLINIC | Age: 44
Discharge: HOME | End: 2019-07-24
Payer: COMMERCIAL

## 2019-07-24 VITALS — HEIGHT: 64 IN | WEIGHT: 107 LBS | BODY MASS INDEX: 18.27 KG/M2

## 2019-07-24 DIAGNOSIS — N63.0 LUMP OR MASS IN BREAST: ICD-10-CM

## 2019-07-24 PROCEDURE — 76642 ULTRASOUND BREAST LIMITED: CPT

## 2019-08-23 ENCOUNTER — PATIENT MESSAGE (OUTPATIENT)
Dept: NEUROLOGY | Facility: CLINIC | Age: 44
End: 2019-08-23

## 2019-08-23 ENCOUNTER — TELEPHONE (OUTPATIENT)
Dept: NEUROLOGY | Facility: CLINIC | Age: 44
End: 2019-08-23

## 2019-08-23 NOTE — TELEPHONE ENCOUNTER
Please see message below from pt  I am not sure if dr Catrachita Gaspar or dr Mandeep Guevara see pts with FSHD  Please let pt know you will check with them  From swallowing standpoint, [facility] does have a dysphagia team   Pt's step mom pcp can also refer to dysphagia team             ----- Message from Amelia Hurtado sent at 8/23/2019  1:14 PM EDT -----  Regarding: FW: Referral Request  Contact: [phone]      ----- Message -----  From: Joselyn Guerra  Sent: 8/23/2019  12:50 PM EDT  To: [facility]  Subject: Referral Request                                 Hello Dr Altagracia Hodgson! I hope you are well! A question for you  My step mother has recently moved to the [place] area  She has MS but also has FSHD  Do you know of any neurologists in that area that specialize in that  And also s physical therapist that would be able to treat her swallowing problem as well as do a barium swallow test?    Thank you in advance!     Storm Montiel

## 2019-08-23 NOTE — TELEPHONE ENCOUNTER
Reading message below it appears she was asking for recommendations in the [place] area not the [place] [address]  Any recommendations for that area?

## 2019-08-23 NOTE — TELEPHONE ENCOUNTER
Oh so sorry, I missed the [facility]  Rec Neuromuscular specialist at [facility]  Dr Layla Griffiths used to be at [facility]  Not sure if still there but excellent reputation  Either of these institutions also would have dysphagia teams as well

## 2019-10-27 DIAGNOSIS — G43.009 MIGRAINE WITHOUT AURA AND WITHOUT STATUS MIGRAINOSUS, NOT INTRACTABLE: ICD-10-CM

## 2019-10-28 RX ORDER — SUMATRIPTAN 50 MG/1
TABLET, FILM COATED ORAL
Qty: 9 TABLET | Refills: 3 | Status: SHIPPED | OUTPATIENT
Start: 2019-10-28 | End: 2020-03-17 | Stop reason: SDUPTHER

## 2020-02-03 ENCOUNTER — HOSPITAL ENCOUNTER (OUTPATIENT)
Dept: MRI IMAGING | Facility: HOSPITAL | Age: 45
Discharge: HOME | End: 2020-02-03
Payer: COMMERCIAL

## 2020-02-03 DIAGNOSIS — R93.0 ABNORMAL MRI OF HEAD: ICD-10-CM

## 2020-02-03 PROCEDURE — 70553 MRI BRAIN STEM W/O & W/DYE: CPT

## 2020-02-03 PROCEDURE — A9585 GADOBUTROL INJECTION: HCPCS | Performed by: PHYSICIAN ASSISTANT

## 2020-02-03 RX ADMIN — GADOBUTROL 4.8 ML: 604.72 INJECTION INTRAVENOUS at 06:58

## 2020-02-13 ENCOUNTER — TELEPHONE (OUTPATIENT)
Dept: NEUROLOGY | Facility: CLINIC | Age: 45
End: 2020-02-13

## 2020-02-13 NOTE — TELEPHONE ENCOUNTER
Called to offer sooner apt with Dr Adali Coker in [facility]   R/S for 2/20/20 @ 9am

## 2020-02-20 ENCOUNTER — OFFICE VISIT (OUTPATIENT)
Dept: NEUROLOGY | Facility: CLINIC | Age: 45
End: 2020-02-20
Payer: COMMERCIAL

## 2020-02-20 VITALS
DIASTOLIC BLOOD PRESSURE: 64 MMHG | BODY MASS INDEX: 18.55 KG/M2 | SYSTOLIC BLOOD PRESSURE: 102 MMHG | WEIGHT: 107.2 LBS | HEART RATE: 72 BPM

## 2020-02-20 DIAGNOSIS — G43.809 HEADACHE, VARIANT MIGRAINE: ICD-10-CM

## 2020-02-20 DIAGNOSIS — M54.81 OCCIPITAL NEURALGIA OF RIGHT SIDE: ICD-10-CM

## 2020-02-20 DIAGNOSIS — R93.0 ABNORMAL MRI OF HEAD: Primary | ICD-10-CM

## 2020-02-20 PROCEDURE — 1036F TOBACCO NON-USER: CPT | Performed by: PSYCHIATRY & NEUROLOGY

## 2020-02-20 PROCEDURE — 99214 OFFICE O/P EST MOD 30 MIN: CPT | Performed by: PSYCHIATRY & NEUROLOGY

## 2020-02-20 NOTE — ASSESSMENT & PLAN NOTE
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

## 2020-02-20 NOTE — PATIENT INSTRUCTIONS
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

## 2020-02-20 NOTE — PROGRESS NOTES
Patient ID: Duke Oneil is a 40 y o  female  Assessment/Plan:    Abnormal MRI of head  Pt here for neuro follow up  Pt last seen one yr ago  Pt had updated mri head since last visit on 2/3/20 Stable MRI of the brain with a few scattered nonspecific white matter foci in the bifrontal subcortical white matter potentially related, gated migraine headaches  Other etiologies not excluded  Otherwise, normal MRI of the brain  Pt denies any new sxs  Cont to follow cllinically    Headache, variant migraine  Pt notes 75 mg imitrex works best for her  Pt does have several ha triggers including sinuses, sleep changes, as well as C2 dural radiation from her neck  Pt may benefit for trigger point injections along the trapezius ridge and suboccipital area  rec consultation with pain mx to help with this trigger       Diagnoses and all orders for this visit:    Abnormal MRI of head    Headache, variant migraine           Subjective:    HPI    Patient is a 40year old female who presents for neurological follow up, last seen in 2/8/19 by alex hicks  Per last note from alex "Patient was initially referred for follow up of abnormal MRI of the brain  In 2013, she developed sudden onset of left hearing loss  She continues to follow with ENT and audiology  Patient had initial MRI brain done in Feb 2013 with non-specific WM changes, predominantly in the frontal lobes  Patient with history of migraines as well  Patient had cord imaging which was negative for any cord lesions  MRI brain has been followed for any changes  MRI has been stable, last done 2/1/17 compared to 9/2014  Stable MR appearance of the brain  Non-specific WM changes right greater than left frontal lobes are stable  Query complicated migraine "  Pt here for her annual follow up  Pt last seen about one yr ago  Overall pt doing well  No new sxs  No falls or trips  No change in bowel or bladder  No loc or sz  No vertigo or dizziness    Pt notes she has an occ headache which is helped by the use of 75 mg imitrex  Pt notes this is the best dosing for her  No cp or sob with the medication  Pt notes she recently had surgery on her right hand of the fusiform bone which is healing well  Just seen yesterday and recovery as expected  Pt notes triggers including sleep change as well as menses  Pt notes she only gets a breakthru period about every 3 months but does note more ha around that time  Other trigger is some pain in the right side of her trapezius ridge and post occipital region  Pt notes sinuses also trigger for her as well  Pt is also moving to [place] for first new home and she is in process of big move and only 40% packed with some increased stress as a contributor as well  Pt rec to consider eval with pain mx to see if trigger point injections may be helpful as current pain is predominately in the right trap area  Pt is in agreement and referral also sent  Pt notes no other new sxs  Rev most updated mri head done on 2/3/20 mri head stable   Stable MRI of the brain with a few scattered nonspecific white matter foci in the bifrontal subcortical white matter potentially related, gated migraine headaches  Other etiologies not excluded  Otherwise, normal MRI of the brain  Study rev in detail likely most consistent with her history of migraines      Total time spent today 25 minutes  Greater than 50% of total time was spent with the patient and / or family counseling and / or coordination of care        The following portions of the patient's history were reviewed and updated as appropriate: allergies, current medications, past family history, past medical history, past social history, past surgical history and problem list and ros rev  Objective:    Blood pressure 102/64, pulse 72, weight 48 6 kg (107 lb 3 2 oz), not currently breastfeeding  Physical Exam   Constitutional: She appears well-developed and well-nourished     Eyes: Pupils are equal, round, and reactive to light  Lids are normal    Cardiovascular: Intact distal pulses  Neurological: She has normal strength and normal reflexes  Psychiatric: Her speech is normal        Neurological Exam  Mental Status  Awake, alert and oriented to person, place and time  Recent and remote memory are intact  Speech is normal  Language is fluent with no aphasia  Attention and concentration are normal  Fund of knowledge is appropriate for level of education  Cranial Nerves  CN II: Visual acuity is normal  Visual fields full to confrontation  Right funduscopic exam: disc intact  Left funduscopic exam: disc intact  CN III, IV, VI: Extraocular movements intact bilaterally  Normal lids and orbits bilaterally  Pupils equal round and reactive to light bilaterally  CN V: Facial sensation is normal   CN VII: Full and symmetric facial movement  CN VIII: Hearing is normal   CN IX, X: Palate elevates symmetrically  Normal gag reflex  CN XI: Shoulder shrug strength is normal   CN XII: Tongue midline without atrophy or fasciculations  Motor  Normal muscle bulk throughout  Normal muscle tone  No abnormal involuntary movements  Strength is 5/5 throughout all four extremities  Sensory  Sensation is intact to light touch, pinprick, vibration and proprioception in all four extremities  Reflexes  Deep tendon reflexes are 2+ and symmetric in all four extremities with downgoing toes bilaterally  Coordination  Right: Finger-to-nose normal  Rapid alternating movement normal   Left: Finger-to-nose normal  Rapid alternating movement normal     Gait  Casual gait is normal including stance, stride, and arm swing  ROS:    Review of Systems   Constitutional: Negative  Negative for appetite change and fever  HENT: Negative  Negative for hearing loss, tinnitus, trouble swallowing and voice change  Eyes: Negative  Negative for photophobia and pain  Respiratory: Negative    Negative for shortness of breath  Cardiovascular: Negative  Negative for palpitations  Gastrointestinal: Negative  Negative for nausea and vomiting  Endocrine: Negative  Negative for cold intolerance and heat intolerance  Genitourinary: Negative  Negative for dysuria, frequency and urgency  Musculoskeletal: Negative  Negative for myalgias and neck pain  Skin: Negative  Negative for rash  Neurological: Positive for headaches  Negative for dizziness, tremors, seizures, syncope, facial asymmetry, speech difficulty, weakness, light-headedness and numbness  Hematological: Negative  Does not bruise/bleed easily  Psychiatric/Behavioral: Negative  Negative for confusion, hallucinations and sleep disturbance

## 2020-03-03 DIAGNOSIS — E03.9 HYPOTHYROIDISM, UNSPECIFIED TYPE: ICD-10-CM

## 2020-03-04 RX ORDER — LEVOTHYROXINE SODIUM 0.07 MG/1
TABLET ORAL
Qty: 90 TABLET | Refills: 0 | Status: SHIPPED | OUTPATIENT
Start: 2020-03-04 | End: 2020-06-03 | Stop reason: SDUPTHER

## 2020-03-04 NOTE — TELEPHONE ENCOUNTER
Please contact patient  I just refilled her levothyroxine  She is due for blood work, TSH, orders placed    Thank you

## 2020-03-17 DIAGNOSIS — G43.009 MIGRAINE WITHOUT AURA AND WITHOUT STATUS MIGRAINOSUS, NOT INTRACTABLE: ICD-10-CM

## 2020-03-17 RX ORDER — SUMATRIPTAN 50 MG/1
TABLET, FILM COATED ORAL
Qty: 9 TABLET | Refills: 3 | Status: SHIPPED | OUTPATIENT
Start: 2020-03-17 | End: 2020-08-24

## 2020-03-17 RX ORDER — SUMATRIPTAN 50 MG/1
TABLET, FILM COATED ORAL
Qty: 9 TABLET | Refills: 3 | Status: CANCELLED | OUTPATIENT
Start: 2020-03-17

## 2020-03-17 NOTE — TELEPHONE ENCOUNTER
Medication refill check list    Correct patient? Y   Correct medication name, dose, and pill size? Y   Correct provider? Y   Last and Next appt  scheduled? 2/20/2020 -Y- 8/31/2020   Right pharmacy listed? Y   Correct quantity for 30 or 90 days? Y   Is the patient out of refills? When was it last prescribed? Y   Directions match what the patient says they are taking?  Y   Enough refills? (none for controlled substances, 1 year for routine medications) Y

## 2020-04-06 ENCOUNTER — TELEPHONE (OUTPATIENT)
Dept: NEUROLOGY | Facility: CLINIC | Age: 45
End: 2020-04-06

## 2020-04-06 DIAGNOSIS — G43.009 MIGRAINE WITHOUT AURA AND WITHOUT STATUS MIGRAINOSUS, NOT INTRACTABLE: Primary | ICD-10-CM

## 2020-04-09 RX ORDER — SUMATRIPTAN 25 MG/1
TABLET, FILM COATED ORAL
Qty: 9 TABLET | Refills: 3 | Status: SHIPPED | OUTPATIENT
Start: 2020-04-09 | End: 2020-08-24

## 2020-06-03 DIAGNOSIS — E03.9 HYPOTHYROIDISM, UNSPECIFIED TYPE: ICD-10-CM

## 2020-06-03 LAB — TSH SERPL-ACNC: 2.87 MIU/L

## 2020-06-03 RX ORDER — LEVOTHYROXINE SODIUM 0.07 MG/1
75 TABLET ORAL DAILY
Qty: 90 TABLET | Refills: 3 | Status: SHIPPED | OUTPATIENT
Start: 2020-06-03 | End: 2021-05-12

## 2020-06-30 ENCOUNTER — TELEPHONE (OUTPATIENT)
Dept: NEUROLOGY | Facility: CLINIC | Age: 45
End: 2020-06-30

## 2020-07-13 ENCOUNTER — HOSPITAL ENCOUNTER (OUTPATIENT)
Dept: MAMMOGRAPHY | Facility: HOSPITAL | Age: 45
Discharge: HOME | End: 2020-07-13
Payer: COMMERCIAL

## 2020-07-13 VITALS — WEIGHT: 107 LBS | HEIGHT: 64 IN | BODY MASS INDEX: 18.27 KG/M2

## 2020-07-13 DIAGNOSIS — Z12.31 ENCOUNTER FOR SCREENING MAMMOGRAM FOR MALIGNANT NEOPLASM OF BREAST: ICD-10-CM

## 2020-07-13 PROCEDURE — 77063 BREAST TOMOSYNTHESIS BI: CPT

## 2020-07-13 PROCEDURE — 77067 SCR MAMMO BI INCL CAD: CPT

## 2020-08-23 DIAGNOSIS — G43.009 MIGRAINE WITHOUT AURA AND WITHOUT STATUS MIGRAINOSUS, NOT INTRACTABLE: ICD-10-CM

## 2020-08-24 DIAGNOSIS — G43.009 MIGRAINE WITHOUT AURA AND WITHOUT STATUS MIGRAINOSUS, NOT INTRACTABLE: ICD-10-CM

## 2020-08-24 RX ORDER — SUMATRIPTAN 25 MG/1
TABLET, FILM COATED ORAL
Qty: 9 TABLET | Refills: 3 | Status: SHIPPED | OUTPATIENT
Start: 2020-08-24 | End: 2021-02-16

## 2020-08-24 RX ORDER — SUMATRIPTAN 50 MG/1
TABLET, FILM COATED ORAL
Qty: 9 TABLET | Refills: 3 | Status: SHIPPED | OUTPATIENT
Start: 2020-08-24 | End: 2021-02-19

## 2020-08-24 NOTE — TELEPHONE ENCOUNTER
Please confirm with pt that at times she uses both the 50 mg and the 25 mg imitrex at time of headache    That would be the reason for 2 rxs of same med but different doses ie 50mg and a 25 mg

## 2020-09-21 ENCOUNTER — OFFICE VISIT (OUTPATIENT)
Dept: GASTROENTEROLOGY | Facility: MEDICAL CENTER | Age: 45
End: 2020-09-21
Payer: COMMERCIAL

## 2020-09-21 VITALS
BODY MASS INDEX: 17.58 KG/M2 | WEIGHT: 101.6 LBS | TEMPERATURE: 98.6 F | DIASTOLIC BLOOD PRESSURE: 70 MMHG | SYSTOLIC BLOOD PRESSURE: 106 MMHG

## 2020-09-21 DIAGNOSIS — R14.0 BLOATING: ICD-10-CM

## 2020-09-21 DIAGNOSIS — R10.13 EPIGASTRIC PAIN: Primary | ICD-10-CM

## 2020-09-21 DIAGNOSIS — K21.9 GASTROESOPHAGEAL REFLUX DISEASE, ESOPHAGITIS PRESENCE NOT SPECIFIED: ICD-10-CM

## 2020-09-21 PROCEDURE — 1036F TOBACCO NON-USER: CPT | Performed by: INTERNAL MEDICINE

## 2020-09-21 PROCEDURE — 99204 OFFICE O/P NEW MOD 45 MIN: CPT | Performed by: INTERNAL MEDICINE

## 2020-09-21 RX ORDER — OMEPRAZOLE 20 MG/1
20 CAPSULE, DELAYED RELEASE ORAL DAILY
Qty: 30 CAPSULE | Refills: 3 | Status: SHIPPED | OUTPATIENT
Start: 2020-09-21 | End: 2020-09-21

## 2020-09-21 RX ORDER — ESOMEPRAZOLE MAGNESIUM 20 MG/1
FOR SUSPENSION ORAL
Qty: 30 MG | Refills: 3 | Status: SHIPPED | OUTPATIENT
Start: 2020-09-21 | End: 2020-09-24

## 2020-09-21 NOTE — PATIENT INSTRUCTIONS
The patient is scheduled at [facility] for an EGD with Dr Jaiyeola on 10/08/2020  prep instructions have been gone over in the office, with the patient, by the MA  The patient is aware that they will receive a call with the arrival time the day prior to procedure and that they will need a  the day of the procedure   I have asked the patient to call with any questions that they might have prior to procedure

## 2020-09-21 NOTE — PROGRESS NOTES
[facility] - Outpatient Consultation  Jami Grewalts 40 y o  female MRN: [id number]  Encounter: [id number]          ASSESSMENT AND PLAN:      1  Epigastric pain  2  Bloating  3  Gastroesophageal reflux disease, esophagitis presence not specified  She reports chronic symptoms of abdominal bloating, reflux and abdominal fullness  She notes the symptoms can be worse after certain types of foods like bread products  She will also have intermittent lower abdominal cramping pain which is improved after removing certain foods from her diet  She has moderate NSAID use for migraines  Differential diagnosis includes gastroesophageal reflux, peptic ulcer disease, celiac disease, for functional dyspepsia  I have ordered serology testing for celiac disease today  She will also start omeprazole 20 mg daily to be taken 30-60 minutes before breakfast   Upper endoscopy will be performed to evaluate for mucosal abnormality, biopsy for H pylori  Informed consent was obtained for the procedure  Risks of infection, perforation and hemorrhage were discussed  The patient was agreeable to proceed with the procedure  - IgA; Future  - Tissue transglutaminase, IgA; Future  - omeprazole (PriLOSEC) 20 mg delayed release capsule; Take 1 capsule (20 mg total) by mouth daily  Dispense: 30 capsule; Refill: 3  - EGD; Future    Colon cancer screenin colonoscopy showed moderate diverticulosis and otherwise normal   She is due in     Follow up after above workup and procedures are complete    ______________________________________________________________________    HPI:  Nadiaoliver Finley is a 40 y o  female with a history of hypothyroidism, migraine headaches, who presents for evaluation of GERD and abdominal pain  She reports intermittent epigastric discomfort, bloating and fullness for the last year    She states the symptoms are worse lying down or after eating certain types of foods like breads or red sauces  She denies substernal chest burning but reports sour taste in her mouth particularly at night  She is sleeping with the head of her bed elevated which has been improving her symptoms  She denies nausea or vomiting  She denies dysphagia or odynophagia  She previously had similar symptoms in 2015 for which she was taking omeprazole with good relief however the medication was self discontinued  At that time she underwent upper endoscopy which was normal   She reports her symptoms can be worse with stress and she has significant burping and gas production  She has been using Motrin 2 pills a few times weekly for some time for headaches  Her bowel movements occur daily or every other day and are formed  She denies melena or hematochezia  She intermittently will have mild lower cramping abdominal pain which is improved after removing certain offending foods  She reports a paternal grandmother with gastric cancer  Her father and mother had history of colon polyps  Her last colonoscopy was 8 years ago and was normal   She has no prior abdominal surgery  REVIEW OF SYSTEMS:    CONSTITUTIONAL: Denies any fever, chills, rigors, and weight loss  HEENT: No earache or tinnitus  Denies hearing loss or visual disturbances  CARDIOVASCULAR: No chest pain or palpitations  RESPIRATORY: Denies any cough, hemoptysis, shortness of breath or dyspnea on exertion  GASTROINTESTINAL: As noted in the History of Present Illness  GENITOURINARY: No problems with urination  Denies any hematuria or dysuria  NEUROLOGIC: No dizziness or vertigo, denies headaches  MUSCULOSKELETAL: Denies any muscle or joint pain  SKIN: Denies skin rashes or itching  ENDOCRINE: Denies excessive thirst  Denies intolerance to heat or cold  PSYCHOSOCIAL: Denies depression or anxiety  Denies any recent memory loss         Historical Information   Past Medical History:   Diagnosis Date    Cluster headache      Past Surgical History:   Procedure Laterality Date    ANKLE SURGERY Left 03/20    HERNIA REPAIR Left 09/2014    Sports Hernia Repair    WRIST SURGERY Right 03/2009    Dr Geoff Iniguez Right 02/07/2020     Social History   Social History     Substance and Sexual Activity   Alcohol Use Yes    Comment: Social      Social History     Substance and Sexual Activity   Drug Use No     Social History     Tobacco Use   Smoking Status Never Smoker   Smokeless Tobacco Never Used     Family History   Problem Relation Age of Onset    Hypothyroidism Mother     Migraines Mother     Hyperlipidemia Father         Pure    Stomach cancer Paternal Grandmother 80    Ovarian cancer Maternal Grandmother 48        over age 48   Elham Leisure No Known Problems Sister     Lymphoma Maternal Grandfather 80    No Known Problems Paternal Grandfather     Prostate cancer Maternal Uncle     Prostate cancer Paternal Uncle        Meds/Allergies       Current Outpatient Medications:     Cholecalciferol (VITAMIN D3) 1000 units CAPS    EPINEPHrine (EPIPEN 2-ABDOUL) 0 3 mg/0 3 mL SOAJ    fluticasone (FLONASE) 50 mcg/act nasal spray    levothyroxine 75 mcg tablet    LO LOESTRIN FE 1 MG-10 MCG / 10 MCG TABS    Loratadine (CLARITIN) 10 MG CAPS    multivitamin (THERAGRAN) TABS    predniSONE 10 mg tablet    SUMAtriptan (IMITREX) 25 mg tablet    SUMAtriptan (IMITREX) 50 mg tablet    Allergies   Allergen Reactions    Codeine      Other reaction(s): Palpitations  Reaction Date: 03Aug2011;     Molds & Smuts            Objective     not currently breastfeeding  There is no height or weight on file to calculate BMI  PHYSICAL EXAM:      General Appearance:   Alert, cooperative, no distress   HEENT:   Normocephalic, atraumatic, anicteric  Neck:  Supple, symmetrical, trachea midline   Lungs:   Clear to auscultation bilaterally; no rales, rhonchi or wheezing; respirations unlabored    Heart[de-identified]   Regular rate and rhythm; no murmur, rub, or gallop  Abdomen:   Soft, non-tender, non-distended; normal bowel sounds; no masses, no organomegaly    Genitalia:   Deferred    Rectal:   Deferred    Extremities:  No cyanosis, clubbing or edema    Pulses:  2+ and symmetric    Skin:  No jaundice, rashes, or lesions    Lymph nodes:  No palpable cervical lymphadenopathy        Lab Results:   No visits with results within 1 Day(s) from this visit  Latest known visit with results is:   Orders Only on 06/02/2020   Component Date Value    TSH 06/02/2020 2 87          Radiology Results:   No results found

## 2020-09-23 ENCOUNTER — ANESTHESIA EVENT (OUTPATIENT)
Dept: GASTROENTEROLOGY | Facility: MEDICAL CENTER | Age: 45
End: 2020-09-23

## 2020-09-24 DIAGNOSIS — K21.9 GASTROESOPHAGEAL REFLUX DISEASE, ESOPHAGITIS PRESENCE NOT SPECIFIED: Primary | ICD-10-CM

## 2020-09-24 NOTE — TELEPHONE ENCOUNTER
Patients GI provider:  Dr Meredith Celestin    Number to return call: [phone]    Reason for call: Pt calling wanting to know if the esomeprazole comes in pill or capsule form  If so, can a script be sent to the pharmacy?     Scheduled procedure/appointment date if applicable: Procedure - 10/08/20

## 2020-09-30 LAB
IGA SERPL-MCNC: 133 MG/DL (ref 47–310)
TTG IGA SER-ACNC: 1 U/ML

## 2020-10-07 ENCOUNTER — TELEPHONE (OUTPATIENT)
Dept: GASTROENTEROLOGY | Facility: MEDICAL CENTER | Age: 45
End: 2020-10-07

## 2020-10-08 ENCOUNTER — ANESTHESIA (OUTPATIENT)
Dept: GASTROENTEROLOGY | Facility: MEDICAL CENTER | Age: 45
End: 2020-10-08

## 2020-10-08 DIAGNOSIS — K21.9 GASTROESOPHAGEAL REFLUX DISEASE, UNSPECIFIED WHETHER ESOPHAGITIS PRESENT: Primary | ICD-10-CM

## 2020-10-08 RX ORDER — PANTOPRAZOLE SODIUM 40 MG/1
40 TABLET, DELAYED RELEASE ORAL DAILY
Qty: 30 TABLET | Refills: 3 | Status: SHIPPED | OUTPATIENT
Start: 2020-10-08 | End: 2020-11-02 | Stop reason: HOSPADM

## 2020-11-02 ENCOUNTER — HOSPITAL ENCOUNTER (OUTPATIENT)
Dept: GASTROENTEROLOGY | Facility: MEDICAL CENTER | Age: 45
Setting detail: OUTPATIENT SURGERY
Discharge: HOME | End: 2020-11-02
Attending: INTERNAL MEDICINE
Payer: COMMERCIAL

## 2020-11-02 VITALS
HEIGHT: 64 IN | RESPIRATION RATE: 16 BRPM | BODY MASS INDEX: 17.42 KG/M2 | WEIGHT: 102 LBS | TEMPERATURE: 97.5 F | DIASTOLIC BLOOD PRESSURE: 57 MMHG | SYSTOLIC BLOOD PRESSURE: 100 MMHG | OXYGEN SATURATION: 100 % | HEART RATE: 73 BPM

## 2020-11-02 VITALS — HEART RATE: 98 BPM

## 2020-11-02 DIAGNOSIS — K21.9 GASTROESOPHAGEAL REFLUX DISEASE: ICD-10-CM

## 2020-11-02 DIAGNOSIS — R10.13 EPIGASTRIC PAIN: ICD-10-CM

## 2020-11-02 LAB
EXT PREGNANCY TEST URINE: NEGATIVE
EXT. CONTROL: NORMAL

## 2020-11-02 PROCEDURE — 43239 EGD BIOPSY SINGLE/MULTIPLE: CPT | Performed by: INTERNAL MEDICINE

## 2020-11-02 PROCEDURE — 88305 TISSUE EXAM BY PATHOLOGIST: CPT | Performed by: PATHOLOGY

## 2020-11-02 PROCEDURE — 81025 URINE PREGNANCY TEST: CPT | Performed by: ANESTHESIOLOGY

## 2020-11-02 PROCEDURE — 88112 CYTOPATH CELL ENHANCE TECH: CPT | Performed by: PATHOLOGY

## 2020-11-02 RX ORDER — SODIUM CHLORIDE 9 MG/ML
125 INJECTION, SOLUTION INTRAVENOUS CONTINUOUS
Status: DISCONTINUED | OUTPATIENT
Start: 2020-11-02 | End: 2020-11-06 | Stop reason: HOSPADM

## 2020-11-02 RX ORDER — PROPOFOL 10 MG/ML
INJECTION, EMULSION INTRAVENOUS AS NEEDED
Status: DISCONTINUED | OUTPATIENT
Start: 2020-11-02 | End: 2020-11-02

## 2020-11-02 RX ORDER — OMEPRAZOLE 20 MG/1
20 CAPSULE, DELAYED RELEASE ORAL DAILY
Qty: 30 CAPSULE | Refills: 3 | Status: SHIPPED | OUTPATIENT
Start: 2020-11-02 | End: 2020-11-09

## 2020-11-02 RX ADMIN — PROPOFOL 50 MG: 10 INJECTION, EMULSION INTRAVENOUS at 09:59

## 2020-11-02 RX ADMIN — PROPOFOL 50 MG: 10 INJECTION, EMULSION INTRAVENOUS at 09:57

## 2020-11-02 RX ADMIN — PROPOFOL 150 MG: 10 INJECTION, EMULSION INTRAVENOUS at 09:55

## 2020-11-02 RX ADMIN — SODIUM CHLORIDE 125 ML/HR: 0.9 INJECTION, SOLUTION INTRAVENOUS at 09:27

## 2020-11-04 DIAGNOSIS — B37.81 CANDIDA ESOPHAGITIS (HCC): Primary | ICD-10-CM

## 2020-11-04 RX ORDER — FLUCONAZOLE 200 MG/1
TABLET ORAL
Qty: 15 TABLET | Refills: 0 | Status: SHIPPED | OUTPATIENT
Start: 2020-11-04 | End: 2020-11-18

## 2020-11-05 ENCOUNTER — TELEPHONE (OUTPATIENT)
Dept: GASTROENTEROLOGY | Facility: MEDICAL CENTER | Age: 45
End: 2020-11-05

## 2020-11-09 DIAGNOSIS — K21.9 GASTROESOPHAGEAL REFLUX DISEASE WITHOUT ESOPHAGITIS: Primary | ICD-10-CM

## 2020-11-09 RX ORDER — FAMOTIDINE 40 MG/1
40 TABLET, FILM COATED ORAL DAILY
Qty: 30 TABLET | Refills: 3 | Status: SHIPPED | OUTPATIENT
Start: 2020-11-09 | End: 2021-04-06

## 2021-02-01 ENCOUNTER — TELEMEDICINE (OUTPATIENT)
Dept: GASTROENTEROLOGY | Facility: MEDICAL CENTER | Age: 46
End: 2021-02-01
Payer: COMMERCIAL

## 2021-02-01 DIAGNOSIS — Z12.11 COLON CANCER SCREENING: ICD-10-CM

## 2021-02-01 DIAGNOSIS — B37.81 CANDIDA ESOPHAGITIS (HCC): ICD-10-CM

## 2021-02-01 DIAGNOSIS — K21.9 GASTROESOPHAGEAL REFLUX DISEASE WITHOUT ESOPHAGITIS: Primary | ICD-10-CM

## 2021-02-01 PROCEDURE — 99214 OFFICE O/P EST MOD 30 MIN: CPT | Performed by: INTERNAL MEDICINE

## 2021-02-01 NOTE — PROGRESS NOTES
Virtual Regular Visit      Assessment/Plan:  63-year-old female with history of hypothyroidism in, migraine headaches who presents for follow-up evaluation  1  Gastroesophageal reflux disease without esophagitis  2  [facility] ([facility]  She previously presented with epigastric fullness, bloating and intermittent GERD symptoms  She underwent upper endoscopy showing 2 cm hiatal hernia mild gastric erythema  She was also found to have Candida esophagitis  After taking a course of fluconazole most of her symptoms resolved  Etiology of her Candida esophagitis is unclear  She does report remote inhaler use however this was 1-2 years ago  I have ordered HIV serology for further evaluation  She does continue to intermittently have GERD symptoms for which she uses Pepcid as needed  She will continue dietary/lifestyle anti-reflux measures  - HIV 1/2 ANTIGEN/ANTIBODY (4TH GENERATION) W REFLEX [facility]; Future    3  Colon cancer screening  Given recent guidelines to start colon cancer screening at age 39 she is due this time  She reports a family history of her mother and father with benign colon polyps and has no family history of colorectal cancer  I discussed the indication, risk and benefit of colonoscopy for colon cancer screening with her today  - Colonoscopy;  Future      Problem List Items Addressed This Visit        Digestive    Gastroesophageal reflux disease - Primary      Other Visit Diagnoses     Candida esophagitis ([facility] )        Relevant Orders    HIV 1/2 ANTIGEN/ANTIBODY (4TH GENERATION) W REFLEX [facility]    Colon cancer screening        Relevant Orders    Colonoscopy               Reason for visit is   Chief Complaint   Patient presents with    Virtual Regular Visit        Encounter provider Danielle Crook MD    Provider located at [address] [address]      Recent Visits  No visits were found meeting these conditions  Showing recent visits within past 7 days and meeting all other requirements     Today's Visits  Date Type Provider Dept   02/01/21 Telemedicine Kimberly Paez MD [address] today's visits and meeting all other requirements     Future Appointments  No visits were found meeting these conditions  Showing future appointments within next 150 days and meeting all other requirements        The patient was identified by name and date of birth  Kiera Fuller was informed that this is a telemedicine visit and that the visit is being conducted through [facility] and patient was informed that this is a secure, HIPAA-compliant platform  She agrees to proceed     My office door was closed  No one else was in the room  She acknowledged consent and understanding of privacy and security of the video platform  The patient has agreed to participate and understands they can discontinue the visit at any time  Patient is aware this is a billable service  Subjective  Kiera Fuller is a 39 y o  female with history of hypothyroidism, migraine headaches who presents for follow-up evaluation   She was 1st seen in the GI office September 2020 for abdominal bloating, reflux and abdominal fullness  She underwent upper endoscopy November 2020 showing mild gastric erythema, 2 cm hiatal hernia and Candida esophagitis  Gastric and duodenal biopsies were unremarkable  She subsequently completed a course of fluconazole  Since then she reports in her symptoms of bloating and discomfort have improved  She has symptoms of gastroesophageal reflux after trigger foods for which she uses Pepcid as needed  Her appetite is good her weight is stable  She reports daily, regular bowel movements and very rare constipation  She has no melena or hematochezia  She reports monitored adequate diagnosed with colon polyps but is unsure if they were advanced or at what age    She has no family history of colorectal cancer  She reports a paternal grandmother with gastric cancer  HPI     Past Medical History:   Diagnosis Date    Cluster headache        Past Surgical History:   Procedure Laterality Date    ANKLE SURGERY Left 03/20    HERNIA REPAIR Left 09/2014    Sports Hernia Repair    WRIST SURGERY Right 03/2009    Dr Garwin Hodgkin Right 02/07/2020       Current Outpatient Medications   Medication Sig Dispense Refill    Cholecalciferol (VITAMIN D3) 1000 units CAPS Take 1 capsule by mouth every other day       EPINEPHrine (EPIPEN 2-ABDOUL) 0 3 mg/0 3 mL SOAJ Inject 0 3 mg as directed as needed        famotidine (PEPCID) 40 MG tablet Take 1 tablet (40 mg total) by mouth daily 30 tablet 3    fluticasone (FLONASE) 50 mcg/act nasal spray 1 spray into each nostril daily 16 g 3    levothyroxine 75 mcg tablet Take 1 tablet (75 mcg total) by mouth daily 90 tablet 3    LO LOESTRIN FE 1 MG-10 MCG / 10 MCG TABS Take 1 tablet by mouth daily        Loratadine (CLARITIN) 10 MG CAPS Take by mouth      multivitamin (THERAGRAN) TABS Take 1 tablet by mouth daily      predniSONE 10 mg tablet Take 40 mg daily for 3 days  Then take 30 mg daily for 3 days  Then take 20 mg daily for 3 days  Then take 10 mg daily for 3 days  (Patient not taking: Reported on 2/20/2020) 30 tablet 0    SUMAtriptan (IMITREX) 25 mg tablet TAKE 1 TAB AT ONSET OF MIGRAINE, MAY REPEAT X 1 IN 2HRS IF NEEDED 9 tablet 3    SUMAtriptan (IMITREX) 50 mg tablet TAKE 1 TABLET AT THE ONSET OF A MIGRAINE, MAY REPEAT IN 2 HOURS IF NEEDED 9 tablet 3     No current facility-administered medications for this visit  Allergies   Allergen Reactions    Codeine      Other reaction(s): Palpitations  Reaction Date: 03Aug2011;     Molds & Smuts        Review of Systems    Video Exam    There were no vitals filed for this visit      Physical Exam   REVIEW OF SYSTEMS:    CONSTITUTIONAL: Denies any fever, chills, rigors, and weight loss   HEENT: No earache or tinnitus  Denies hearing loss or visual disturbances  CARDIOVASCULAR: No chest pain or palpitations  RESPIRATORY: Denies any cough, hemoptysis, shortness of breath or dyspnea on exertion  GASTROINTESTINAL: As noted in the History of Present Illness  GENITOURINARY: No problems with urination  Denies any hematuria or dysuria  NEUROLOGIC: No dizziness or vertigo, denies headaches  MUSCULOSKELETAL: Denies any muscle or joint pain  SKIN: Denies skin rashes or itching  ENDOCRINE: Denies excessive thirst  Denies intolerance to heat or cold  PSYCHOSOCIAL: Denies depression or anxiety  Denies any recent memory loss  PHYSICAL EXAMINATION:  Appearance and vitals taken from home devices    General Appearance:   Alert, cooperative, no distress   HEENT:  Normocephalic, atraumatic, anicteric  Neck supple, symmetrical, trachea midline  Lungs:   Equal chest rise and unlabored breathing, normal effort, no coughing  Cardiovascular:   No visualized JVD  Abdomen:   No abdominal distension  Skin:   No jaundice, rashes, or lesions  Musculoskeletal:   Normal range of motion visualized  Psych:  Normal affect and normal insight  Neuro:  Alert and appropriate  I spent 15 minutes directly with the patient during this visit      VIRTUAL VISIT DISCLAIMER    Constance Workman acknowledges that she has consented to an online visit or consultation  She understands that the online visit is based solely on information provided by her, and that, in the absence of a face-to-face physical evaluation by the physician, the diagnosis she receives is both limited and provisional in terms of accuracy and completeness  This is not intended to replace a full medical face-to-face evaluation by the physician  [facility] understands and accepts these terms

## 2021-02-02 PROBLEM — R09.81 NASAL CONGESTION: Status: ACTIVE | Noted: 2021-02-02

## 2021-02-03 ENCOUNTER — TELEPHONE (OUTPATIENT)
Dept: GASTROENTEROLOGY | Facility: MEDICAL CENTER | Age: 46
End: 2021-02-03

## 2021-02-03 NOTE — TELEPHONE ENCOUNTER
The patient is scheduled at [place] for a colon with Amanda Pride on 03/16/2021  Miralax/ducolax prep instructions have been mailed to the patient  The patient is aware that they will receive a call with the arrival time the day prior to procedure and that they will need a  the day of the procedure   I have asked the patient to call with any questions that they might have prior to procedure

## 2021-02-03 NOTE — TELEPHONE ENCOUNTER
----- Message from Danielle Crook MD sent at 2/1/2021  9:43 AM EST -----  Dear Staff,     I have seen this patient as a virtual visit  During the visit the following tests/ recommendations were made  Please send the patient the requisitions for the tests and help them with scheduling as needed  Please also send them the instructions (if any listed) by mail/ email  Thank you  Danielle Crook MD      Procedures : Colonoscopy , Elective    Testing : Labs     Referrals : None    Preparation for Colonoscopy : Miralax/ dulcolax    Follow up : PA in 6 month    Instructions : Gastroesophageal Reflux Disease   AMBULATORY CARE:   Gastroesophageal reflux disease (GERD)  is reflux that occurs more than twice a week for a few weeks  Reflux means acid and food in the stomach back up into the esophagus  It usually causes heartburn and other symptoms  GERD can cause other health problems over time if it is not treated  Signs and symptoms:   · Heartburn (burning pain in your chest)    · Pain after meals that spreads to your neck, jaw, or shoulder    · Pain that gets better when you change positions    · Bitter or acid taste in your mouth    · A dry cough    · Trouble swallowing or pain with swallowing    · Hoarseness or a sore throat    · Burping or hiccups    · Feeling full soon after you start eating    Call your local emergency number (911 in the [address] [facility]) if:   · You have severe chest pain and sudden trouble breathing  Seek care immediately if:   · You have trouble breathing after you vomit  · You have trouble swallowing, or pain with swallowing  · Your bowel movements are black, bloody, or tarry-looking  · Your vomit looks like coffee grounds or has blood in it  Call your doctor or gastroenterologist if:   · You feel full and cannot burp or vomit  · You vomit large amounts, or you vomit often  · You are losing weight without trying      · Your symptoms get worse or do not improve with treatment  · You have questions or concerns about your condition or care  Treatment for GERD:   · Medicines  are used to decrease stomach acid  Medicine may also be used to help your lower esophageal sphincter and stomach contract (tighten) more  · Surgery  is done to wrap the upper part of the stomach around the esophageal sphincter  This will strengthen the sphincter and prevent reflux  Manage GERD:   · Do not have foods or drinks that may increase heartburn  These include chocolate, peppermint, fried or fatty foods, drinks that contain caffeine, or carbonated drinks (soda)  Other foods include spicy foods, onions, tomatoes, and tomato-based foods  Do not have foods or drinks that can irritate your esophagus, such as citrus fruits, juices, and alcohol  · Do not eat large meals  When you eat a lot of food at one time, your stomach needs more acid to digest it  Eat 6 small meals each day instead of 3 large meals, and eat slowly  Do not eat meals 2 to 3 hours before bedtime  · Elevate the head of your bed  Place 6-inch blocks under the head of your bed frame  You may also use more than one pillow under your head and shoulders while you sleep  · Maintain a healthy weight  If you are overweight, weight loss may help relieve symptoms of GERD  · Do not smoke  Smoking weakens the lower esophageal sphincter and increases the risk of GERD  Ask your healthcare provider for information if you currently smoke and need help to quit  E-cigarettes or smokeless tobacco still contain nicotine  Talk to your healthcare provider before you use these products  · Do not wear clothing that is tight around your waist   Tight clothing can put pressure on your stomach and cause or worsen GERD symptoms  Follow up with your doctor or gastroenterologist as directed:  Write down your questions so you remember to ask them during your visits    © Copyright [employer] 2020 Information is for End User's use only and may not be sold, redistributed or otherwise used for commercial purposes  All illustrations and images included in CareNotes® are the copyrighted property of A D A M , Inc  or Manish Parker  The above information is an  only  It is not intended as medical advice for individual conditions or treatments  Talk to your doctor, nurse or pharmacist before following any medical regimen to see if it is safe and effective for you

## 2021-02-15 ENCOUNTER — ANESTHESIA EVENT (OUTPATIENT)
Dept: GASTROENTEROLOGY | Facility: MEDICAL CENTER | Age: 46
End: 2021-02-15

## 2021-02-16 DIAGNOSIS — G43.009 MIGRAINE WITHOUT AURA AND WITHOUT STATUS MIGRAINOSUS, NOT INTRACTABLE: ICD-10-CM

## 2021-02-16 RX ORDER — SUMATRIPTAN 25 MG/1
TABLET, FILM COATED ORAL
Qty: 9 TABLET | Refills: 3 | Status: SHIPPED | OUTPATIENT
Start: 2021-02-16 | End: 2021-07-06

## 2021-02-18 DIAGNOSIS — G43.009 MIGRAINE WITHOUT AURA AND WITHOUT STATUS MIGRAINOSUS, NOT INTRACTABLE: ICD-10-CM

## 2021-02-18 LAB — HIV 1+2 AB+HIV1 P24 AG SERPL QL IA: NORMAL

## 2021-02-18 RX ORDER — SUMATRIPTAN 50 MG/1
TABLET, FILM COATED ORAL
Qty: 9 TABLET | Refills: 3 | OUTPATIENT
Start: 2021-02-18

## 2021-02-19 DIAGNOSIS — G43.009 MIGRAINE WITHOUT AURA AND WITHOUT STATUS MIGRAINOSUS, NOT INTRACTABLE: ICD-10-CM

## 2021-02-19 RX ORDER — SUMATRIPTAN 50 MG/1
TABLET, FILM COATED ORAL
Qty: 9 TABLET | Refills: 3 | Status: SHIPPED | OUTPATIENT
Start: 2021-02-19 | End: 2021-07-09

## 2021-03-01 ENCOUNTER — OFFICE VISIT (OUTPATIENT)
Dept: NEUROLOGY | Facility: CLINIC | Age: 46
End: 2021-03-01
Payer: COMMERCIAL

## 2021-03-01 VITALS
HEART RATE: 77 BPM | BODY MASS INDEX: 17.65 KG/M2 | DIASTOLIC BLOOD PRESSURE: 59 MMHG | SYSTOLIC BLOOD PRESSURE: 108 MMHG | WEIGHT: 102 LBS

## 2021-03-01 DIAGNOSIS — R93.0 ABNORMAL MRI OF HEAD: ICD-10-CM

## 2021-03-01 DIAGNOSIS — G43.809 HEADACHE, VARIANT MIGRAINE: ICD-10-CM

## 2021-03-01 DIAGNOSIS — M25.519 TRIGGER POINT OF SHOULDER REGION, UNSPECIFIED LATERALITY: Primary | ICD-10-CM

## 2021-03-01 PROCEDURE — 1036F TOBACCO NON-USER: CPT | Performed by: PSYCHIATRY & NEUROLOGY

## 2021-03-01 PROCEDURE — 99213 OFFICE O/P EST LOW 20 MIN: CPT | Performed by: PSYCHIATRY & NEUROLOGY

## 2021-03-01 NOTE — ASSESSMENT & PLAN NOTE
Pt notes headache frequency about every other week  Pt notes regimen of the imitrex for total of 75 mg working well for her  Pt notes med helps as good abortive  Pt not taking repetitive doses in same day  Pt denies any sob or cp with med  Pt to call for any increase in freq or character  Pt is also now seeing dr Jessica Hernandez from ent for her sinuses  Pt with upcoming nasal nerve cryoablation therapy in 2 weeks  Pt to call for any new sxs  Pt notes neck and trapezius ridge remain trigger for ha  Will refer back to pain mx for eval

## 2021-03-01 NOTE — PROGRESS NOTES
Patient ID: Félix Barry is a 39 y o  female  Assessment/Plan:    Abnormal MRI of head  Pt here for neuro follow up  Pt last seen about one yr ago pre covid  Pt notes no new neuro sxs  Exam remains nonfocal    Headache, variant migraine  Pt notes headache frequency about every other week  Pt notes regimen of the imitrex for total of 75 mg working well for her  Pt notes med helps as good abortive  Pt not taking repetitive doses in same day  Pt denies any sob or cp with med  Pt to call for any increase in freq or character  Pt is also now seeing dr Hermelindo Wright from ent for her sinuses  Pt with upcoming nasal nerve cryoablation therapy in 2 weeks  Pt to call for any new sxs  Pt notes neck and trapezius ridge remain trigger for ha  Will refer back to pain mx for eval       Diagnoses and all orders for this visit:    Trigger point of shoulder region, unspecified laterality  -     Ambulatory referral to Pain Management; Future    Abnormal MRI of head    Headache, variant migraine           Subjective:    HPI    Patient is a 39year old female who presents for neurological follow up, last seen in 2/20/20 by me  Per my last note  "Patient was initially referred for follow up of abnormal MRI of the brain  In 2013, she developed sudden onset of left hearing loss  She continues to follow with ENT and audiology  Patient had initial MRI brain done in Feb 2013 with non-specific WM changes, predominantly in the frontal lobes  Patient with history of migraines as well  Patient had cord imaging which was negative for any cord lesions  MRI brain has been followed for any changes   MRI has been stable, last done 2/1/17 compared to 9/2014  Stable MR appearance of the brain  Non-specific WM changes right greater than left frontal lobes are stable  Query complicated migraine "  Pt here for her annual follow up  Overall pt doing well for the past yr  Pt was seen right before covid pandemic start    Pt notes she completed her move to [facility] home   Pt notes no neuro sxs over past yr  No tia or cva like sxs  Pt notes she is working from home as is her spouse  No close covid contacts  No fever or chills  No cough or sob  Pt denies any new sxs  No falls or trips  No change in bowel or bladder  No LOC, no seizure  No change in speech or swallowing  No change in vision  No loss of vision  No diplopia  No loss of vision  No headache, no nausea or vomiting  No recent hospitalizations  No recent infections  Pt is now following closely with ent due to long standing sinus issues  Pt has an upcoming procedure in 2 weeks for nasal nerve cryoablation therapy  Pt notes this procedure can last up to 2 yrs  Pt continues on her current abortive regimen for her headaches with imitrex  No worsening of headache frequency or severity over the past yr  Pt notes she has an occ headache which is helped by the use of 75 mg imitrex  Pt notes this is the best dosing for her  No cp or sob with the medication  Pt notes her neck and upper trapezius muscles at times also trigger for her headaches  Pt did not see pain mx at time of last appt due to covid  Pt is now interested especially with working more on computer  New referral placed today  Re reviewed last  mri head done on 2/3/20 mri head stable   Stable MRI of the brain with a few scattered nonspecific white matter foci in the bifrontal subcortical white matter potentially related, gated migraine headaches   Other etiologies not excluded   Otherwise, normal MRI of the brain  Pt denies any tob history        The following portions of the patient's history were reviewed and updated as appropriate: allergies, current medications, past family history, past medical history, past social history, past surgical history and problem list and med rec and ros rev  Objective:    Blood pressure 108/59, pulse 77, weight 46 3 kg (102 lb), not currently breastfeeding      Physical Exam  Constitutional: General: She is not in acute distress  Appearance: She is not ill-appearing  Eyes:      General: Lids are normal       Extraocular Movements: Extraocular movements intact  Pupils: Pupils are equal, round, and reactive to light  Musculoskeletal:      Right lower leg: No edema  Left lower leg: No edema  Neurological:      Mental Status: She is alert  Coordination: Coordination is intact  Deep Tendon Reflexes: Strength normal and reflexes are normal and symmetric  Psychiatric:         Speech: Speech normal          Neurological Exam  Mental Status  Alert  Recent and remote memory are intact  Speech is normal  Language is fluent with no aphasia  Attention and concentration are normal     Cranial Nerves  CN II: Visual acuity is normal  Visual fields full to confrontation  Right funduscopic exam: disc intact  Left funduscopic exam: disc intact  CN III, IV, VI: Extraocular movements intact bilaterally  Normal lids and orbits bilaterally  Pupils equal round and reactive to light bilaterally  CN V: Facial sensation is normal   CN VII: Full and symmetric facial movement  CN VIII: Hearing is normal   CN IX, X: Palate elevates symmetrically  Normal gag reflex  CN XI: Shoulder shrug strength is normal   CN XII: Tongue midline without atrophy or fasciculations  Motor  Normal muscle bulk throughout  Normal muscle tone  No abnormal involuntary movements  Strength is 5/5 throughout all four extremities  Sensory  Sensation is intact to light touch, pinprick, vibration and proprioception in all four extremities  Reflexes  Deep tendon reflexes are 2+ and symmetric in all four extremities with downgoing toes bilaterally  Coordination  Finger-to-nose, rapid alternating movements and heel-to-shin normal bilaterally without dysmetria  Gait  Normal casual, toe, heel and tandem gait  ROS:    Review of Systems   Constitutional: Negative  Negative for appetite change and fever  HENT: Negative  Negative for hearing loss, tinnitus, trouble swallowing and voice change  Eyes: Negative  Negative for photophobia and pain  Respiratory: Negative  Negative for shortness of breath  Cardiovascular: Negative  Negative for palpitations  Gastrointestinal: Negative  Negative for nausea and vomiting  Endocrine: Negative  Negative for cold intolerance  Genitourinary: Negative  Negative for dysuria, frequency and urgency  Musculoskeletal: Negative  Negative for myalgias and neck pain  Skin: Negative  Negative for rash  Neurological: Positive for headaches  Negative for dizziness, tremors, seizures, syncope, facial asymmetry, speech difficulty, weakness, light-headedness and numbness  Hematological: Negative  Does not bruise/bleed easily  Psychiatric/Behavioral: Negative  Negative for confusion, hallucinations and sleep disturbance

## 2021-03-01 NOTE — ASSESSMENT & PLAN NOTE
Pt here for neuro follow up  Pt last seen about one yr ago pre covid  Pt notes no new neuro sxs  Exam remains nonfocal

## 2021-03-16 ENCOUNTER — ANESTHESIA (OUTPATIENT)
Dept: GASTROENTEROLOGY | Facility: MEDICAL CENTER | Age: 46
End: 2021-03-16

## 2021-03-16 ENCOUNTER — HOSPITAL ENCOUNTER (OUTPATIENT)
Dept: GASTROENTEROLOGY | Facility: MEDICAL CENTER | Age: 46
Setting detail: OUTPATIENT SURGERY
Discharge: HOME | End: 2021-03-16
Payer: COMMERCIAL

## 2021-03-16 VITALS
RESPIRATION RATE: 18 BRPM | OXYGEN SATURATION: 100 % | DIASTOLIC BLOOD PRESSURE: 66 MMHG | SYSTOLIC BLOOD PRESSURE: 113 MMHG | WEIGHT: 102 LBS | HEART RATE: 71 BPM | HEIGHT: 61 IN | BODY MASS INDEX: 19.26 KG/M2

## 2021-03-16 DIAGNOSIS — Z12.11 COLON CANCER SCREENING: ICD-10-CM

## 2021-03-16 PROCEDURE — 45385 COLONOSCOPY W/LESION REMOVAL: CPT | Performed by: INTERNAL MEDICINE

## 2021-03-16 PROCEDURE — 88305 TISSUE EXAM BY PATHOLOGIST: CPT | Performed by: PATHOLOGY

## 2021-03-16 PROCEDURE — 45381 COLONOSCOPY SUBMUCOUS NJX: CPT | Performed by: INTERNAL MEDICINE

## 2021-03-16 RX ORDER — PROPOFOL 10 MG/ML
INJECTION, EMULSION INTRAVENOUS AS NEEDED
Status: DISCONTINUED | OUTPATIENT
Start: 2021-03-16 | End: 2021-03-16

## 2021-03-16 RX ORDER — SODIUM CHLORIDE 9 MG/ML
125 INJECTION, SOLUTION INTRAVENOUS CONTINUOUS
Status: DISCONTINUED | OUTPATIENT
Start: 2021-03-16 | End: 2021-03-20 | Stop reason: HOSPADM

## 2021-03-16 RX ADMIN — PROPOFOL 50 MG: 10 INJECTION, EMULSION INTRAVENOUS at 09:05

## 2021-03-16 RX ADMIN — PROPOFOL 50 MG: 10 INJECTION, EMULSION INTRAVENOUS at 09:15

## 2021-03-16 RX ADMIN — PROPOFOL 50 MG: 10 INJECTION, EMULSION INTRAVENOUS at 09:09

## 2021-03-16 RX ADMIN — SODIUM CHLORIDE 125 ML/HR: 0.9 INJECTION, SOLUTION INTRAVENOUS at 08:11

## 2021-03-16 RX ADMIN — PROPOFOL 50 MG: 10 INJECTION, EMULSION INTRAVENOUS at 09:02

## 2021-03-16 RX ADMIN — PROPOFOL 50 MG: 10 INJECTION, EMULSION INTRAVENOUS at 08:52

## 2021-03-16 RX ADMIN — PROPOFOL 50 MG: 10 INJECTION, EMULSION INTRAVENOUS at 09:21

## 2021-03-16 RX ADMIN — PROPOFOL 50 MG: 10 INJECTION, EMULSION INTRAVENOUS at 08:44

## 2021-03-16 RX ADMIN — PROPOFOL 50 MG: 10 INJECTION, EMULSION INTRAVENOUS at 08:43

## 2021-03-16 RX ADMIN — Medication 40 MG: at 08:52

## 2021-03-16 RX ADMIN — PROPOFOL 50 MG: 10 INJECTION, EMULSION INTRAVENOUS at 08:59

## 2021-03-16 RX ADMIN — PROPOFOL 50 MG: 10 INJECTION, EMULSION INTRAVENOUS at 08:48

## 2021-03-16 RX ADMIN — PROPOFOL 50 MG: 10 INJECTION, EMULSION INTRAVENOUS at 08:56

## 2021-03-16 RX ADMIN — SODIUM CHLORIDE: 0.9 INJECTION, SOLUTION INTRAVENOUS at 09:22

## 2021-03-16 NOTE — H&P
H&P EXAM - Outpatient Endoscopy   Willie Nettles 39 y o  female MRN: [id number]    [address] END[facility]   Encounter: [id number]        History and Physical - [facility]  Willie Nettles 39 y o  female MRN: [id number]                  HPI: Uriel Garner is a 39y o  year old female who presents for colon cancer screening      REVIEW OF SYSTEMS: Per the HPI, and otherwise unremarkable      Historical Information   Past Medical History:   Diagnosis Date    Cluster headache      Past Surgical History:   Procedure Laterality Date    ANKLE SURGERY Left 03/20    HERNIA REPAIR Left 09/2014    Sports Hernia Repair    WRIST SURGERY Right 03/2009    Dr Chasity Boggs Right 02/07/2020     Social History   Social History     Substance and Sexual Activity   Alcohol Use Yes    Comment: Social      Social History     Substance and Sexual Activity   Drug Use No     Social History     Tobacco Use   Smoking Status Never Smoker   Smokeless Tobacco Never Used     Family History   Problem Relation Age of Onset    Hypothyroidism Mother    [facility] Migraines Mother     Hyperlipidemia Father         Pure    Stomach cancer Paternal Grandmother 80    Ovarian cancer Maternal Grandmother 48        over age 48   [facility] No Known Problems Sister     Lymphoma Maternal Grandfather 80    No Known Problems Paternal Grandfather     Prostate cancer Maternal Uncle     Prostate cancer Paternal Uncle        Meds/Allergies     (Not in a hospital admission)      Allergies   Allergen Reactions    Codeine      Other reaction(s): Palpitations  Reaction Date: 03Aug2011;     Molds & Smuts        Objective     /75   Pulse 70   Resp 16   Ht 5' 0 75" (1 543 m)   Wt 46 3 kg (102 lb)   SpO2 100%   BMI 19 43 kg/m²       PHYSICAL EXAM    Gen: NAD  CV: RRR  CHEST: Clear  ABD: soft, NT/ND  EXT: no edema      ASSESSMENT/PLAN:  This is a 39y o  year old female here for colonoscopy, and she is stable and optimized for her procedure

## 2021-03-16 NOTE — ANESTHESIA PREPROCEDURE EVALUATION
Procedure:  COLONOSCOPY    Relevant Problems   CARDIO   (+) Headache, variant migraine      ENDO   (+) Hypothyroidism      GI/HEPATIC   (+) Gastroesophageal reflux disease      NEURO/PSYCH   (+) Headache, variant migraine        Physical Exam    Airway    Mallampati score: II  TM Distance: >3 FB  Neck ROM: full     Dental       Cardiovascular  Rhythm: regular, Rate: normal, Cardiovascular exam normal    Pulmonary  Pulmonary exam normal Breath sounds clear to auscultation,     Other Findings        Anesthesia Plan  ASA Score- 2     Anesthesia Type- IV sedation with anesthesia with ASA Monitors  Additional Monitors:   Airway Plan:           Plan Factors-Exercise tolerance (METS): >4 METS  Chart reviewed  Existing labs reviewed  Patient is not a current smoker  Obstructive sleep apnea risk education given perioperatively  Induction- intravenous  Postoperative Plan-     Informed Consent- Anesthetic plan and risks discussed with patient

## 2021-03-16 NOTE — DISCHARGE INSTRUCTIONS
Colonoscopy   WHAT YOU NEED TO KNOW:   A colonoscopy is a procedure to examine the inside of your colon (intestine) with a scope  Polyps or tissue growths may have been removed during your colonoscopy  It is normal to feel bloated and to have some abdominal discomfort  You should be passing gas  If you have hemorrhoids or you had polyps removed, you may have a small amount of bleeding  DISCHARGE INSTRUCTIONS:   Seek care immediately if:   · You have a large amount of bright red blood in your bowel movements  · Your abdomen is hard and firm and you have severe pain  · You have sudden trouble breathing  Contact your healthcare provider if:   · You develop a rash or hives  · You have a fever within 24 hours of your procedure       · You have not had a bowel movement for 3 days after your procedure  · You have questions or concerns about your condition or care  Activity:   · Do not lift, strain, or run  for 3 days after your procedure  · Rest after your procedure  You have been given medicine to relax you  Do not  drive or make important decisions until the day after your procedure  Return to your normal activity as directed  · Relieve gas and discomfort from bloating  by lying on your right side with a heating pad on your abdomen  You may need to take short walks to help the gas move out  Eat small meals until bloating is relieved  If you had polyps removed: For 7 days after your procedure:  · Do not  take aspirin  · Do not  go on long car rides  Follow up with your healthcare provider as directed:  Write down your questions so you remember to ask them during your visits  © 2017 [address] is for End User's use only and may not be sold, redistributed or otherwise used for commercial purposes  All illustrations and images included in CareNotes® are the copyrighted property of [employer]  or [employer]    The above information is an  only  It is not intended as medical advice for individual conditions or treatments  Talk to your doctor, nurse or pharmacist before following any medical regimen to see if it is safe and effective for you  Colorectal Polyps   WHAT YOU NEED TO KNOW:   Colorectal polyps are small growths of tissue in the lining of the colon and rectum  Most polyps are hyperplastic polyps and are usually benign (noncancerous)  Certain types of polyps, called adenomatous polyps, may turn into cancer  DISCHARGE INSTRUCTIONS:   Follow up with your healthcare provider or gastroenterologist as directed: You may need to return for more tests, such as another colonoscopy  Write down your questions so you remember to ask them during your visits  Reduce your risk for colorectal polyps:   · Eat a variety of healthy foods:  Healthy foods include fruit, vegetables, whole-grain breads, low-fat dairy products, beans, lean meat, and fish  Ask if you need to be on a special diet  · Maintain a healthy weight:  Ask your healthcare provider if you need to lose weight and how much you need to lose  Ask for help with a weight loss program     · Exercise:  Begin to exercise slowly and do more as you get stronger  Talk with your healthcare provider before you start an exercise program      · Limit alcohol:  Your risk for polyps increases the more you drink  · Do not smoke: If you smoke, it is never too late to quit  Ask for information about how to stop  For support and more information:   · Satya Marks ([facility]) [address], [place] [address]  Phone: 9- 600 - [phone]  Web Address: www digestive  niddk nih gov    Contact your healthcare provider or gastroenterologist if:   · You have a fever  · You have chills, a cough, or feel weak and achy  · You have abdominal pain that does not go away or gets worse after you take medicine  · Your abdomen is swollen  · You are losing weight without trying  · You have questions or concerns about your condition or care  Seek care immediately or call 911 if:   · You have sudden shortness of breath  · You have a fast heart rate, fast breathing, or are too dizzy to stand up  · You have severe abdominal pain  · You see blood in your bowel movement  © Copyright [address] Information is for End User's use only and may not be sold, redistributed or otherwise used for commercial purposes  All illustrations and images included in CareNotes® are the copyrighted property of [employer] Inc  or [facility] Veena Stein   The above information is an  only  It is not intended as medical advice for individual conditions or treatments  Talk to your doctor, nurse or pharmacist before following any medical regimen to see if it is safe and effective for you  Hemorrhoids   WHAT YOU NEED TO KNOW:   Hemorrhoids are swollen blood vessels inside your rectum (internal hemorrhoids) or on your anus (external hemorrhoids)  Sometimes a hemorrhoid may prolapse  This means it extends out of your anus  DISCHARGE INSTRUCTIONS:   Seek care immediately if:   · You have severe pain in your rectum or around your anus  · You have severe pain in your abdomen and you are vomiting  · You have bleeding from your anus that soaks through your underwear  Contact your healthcare provider if:   · You have frequent and painful bowel movements  · Your hemorrhoid looks or feels more swollen than usual      · You do not have a bowel movement for 2 days or more  · You see or feel tissue coming through your anus  · You have questions or concerns about your condition or care  Medicines: You may  need any of the following:  · Medicine  may be given to decrease pain, swelling, and itching  The medicine may come as a pad, cream, or ointment  · Stool softeners  help treat or prevent constipation       · NSAIDs , such as ibuprofen, help decrease swelling, pain, and fever  NSAIDs can cause stomach bleeding or kidney problems in certain people  If you take blood thinner medicine, always ask your healthcare provider if NSAIDs are safe for you  Always read the medicine label and follow directions  · Take your medicine as directed  Contact your healthcare provider if you think your medicine is not helping or if you have side effects  Tell him or her if you are allergic to any medicine  Keep a list of the medicines, vitamins, and herbs you take  Include the amounts, and when and why you take them  Bring the list or the pill bottles to follow-up visits  Carry your medicine list with you in case of an emergency  Manage your symptoms:   · Apply ice on your anus for 15 to 20 minutes every hour or as directed  Use an ice pack, or put crushed ice in a plastic bag  Cover it with a towel before you apply it to your anus  Ice helps prevent tissue damage and decreases swelling and pain  · Take a sitz bath  Fill a bathtub with 4 to 6 inches of warm water  You may also use a sitz bath pan that fits inside a toilet bowl  Sit in the sitz bath for 15 minutes  Do this 3 times a day, and after each bowel movement  The warm water can help decrease pain and swelling  · Keep your anal area clean  Gently wash the area with warm water daily  Soap may irritate the area  After a bowel movement, wipe with moist towelettes or wet toilet paper  Dry toilet paper can irritate the area  Prevent hemorrhoids:   · Do not strain to have a bowel movement  Do not sit on the toilet too long  These actions can increase pressure on the tissues in your rectum and anus  · Drink plenty of liquids  Liquids can help prevent constipation  Ask how much liquid to drink each day and which liquids are best for you  · Eat a variety of high-fiber foods  Examples include fruits, vegetables, and whole grains   Ask your healthcare provider how much fiber you need each day  You may need to take a fiber supplement  · Exercise as directed  Exercise, such as walking, may make it easier to have a bowel movement  Ask your healthcare provider to help you create an exercise plan  · Do not have anal sex  Anal sex can weaken the skin around your rectum and anus  · Avoid heavy lifting  This can cause straining and increase your risk for another hemorrhoid  Follow up with your healthcare provider as directed:  Write down your questions so you remember to ask them during your visits  © Copyright [address] Information is for End User's use only and may not be sold, redistributed or otherwise used for commercial purposes  All illustrations and images included in CareNotes® are the copyrighted property of A D A M , Inc  or [address] [place], [place] [address]rol[address]   The above information is an  only  It is not intended as medical advice for individual conditions or treatments  Talk to your doctor, nurse or pharmacist before following any medical regimen to see if it is safe and effective for you

## 2021-03-17 ENCOUNTER — TELEPHONE (OUTPATIENT)
Dept: GASTROENTEROLOGY | Facility: AMBULARY SURGERY CENTER | Age: 46
End: 2021-03-17

## 2021-03-17 NOTE — TELEPHONE ENCOUNTER
Patients GI provider:  Dr Shea Cosme    Number to return call: (  [phone]    Reason for call: Pt had colon done 3-16-21 and staples were placed   She wants to know when she can resume physical activity and how much    Scheduled procedure/appointment date if applicable: Apt/procedure na

## 2021-03-18 NOTE — RESULT ENCOUNTER NOTE
My medical assistant will call patient with results  The  colon polyp removed was called an adenoma  This is a pre-cancerous lesion and was completely removed  There was no evidence of cancer in the polyp       she should have the colonoscopy repeated in 6 months as we discussed after the colonoscopy

## 2021-03-18 NOTE — TELEPHONE ENCOUNTER
Patient is s/p colonoscopy March 16 with clip placement x 5  She called to ask if she can resume normal activity level  She is feeling well  Discussed clips were placed, not staples, which will naturally pass  Okay to resume normal  activity level

## 2021-03-19 ENCOUNTER — TELEPHONE (OUTPATIENT)
Dept: GASTROENTEROLOGY | Facility: MEDICAL CENTER | Age: 46
End: 2021-03-19

## 2021-03-19 NOTE — TELEPHONE ENCOUNTER
[employer]      Result Notes     Georges Cristiana, [address] [place]   3/19/2021  2:12 PM EDT      Spoke with patient and discussed results and repeat   started phone encounter for scheduling    Matthew Colindres MD   3/18/2021 12:23 PM EDT      My medical assistant will call patient with results      The  colon polyp removed was called an adenoma  This is a pre-cancerous lesion and was completely removed   There was no evidence of cancer in the polyp       she should have the colonoscopy repeated in 6 months as we discussed after the colonoscopy

## 2021-04-05 ENCOUNTER — OFFICE VISIT (OUTPATIENT)
Dept: FAMILY MEDICINE CLINIC | Facility: CLINIC | Age: 46
End: 2021-04-05
Payer: COMMERCIAL

## 2021-04-05 VITALS
BODY MASS INDEX: 19.11 KG/M2 | OXYGEN SATURATION: 100 % | HEART RATE: 85 BPM | DIASTOLIC BLOOD PRESSURE: 56 MMHG | RESPIRATION RATE: 14 BRPM | SYSTOLIC BLOOD PRESSURE: 98 MMHG | HEIGHT: 61 IN | WEIGHT: 101.2 LBS | TEMPERATURE: 97.8 F

## 2021-04-05 DIAGNOSIS — K21.9 GASTROESOPHAGEAL REFLUX DISEASE WITHOUT ESOPHAGITIS: ICD-10-CM

## 2021-04-05 DIAGNOSIS — E55.9 VITAMIN D DEFICIENCY: ICD-10-CM

## 2021-04-05 DIAGNOSIS — E03.9 HYPOTHYROIDISM, UNSPECIFIED TYPE: ICD-10-CM

## 2021-04-05 DIAGNOSIS — D12.4 ADENOMATOUS POLYP OF DESCENDING COLON: Chronic | ICD-10-CM

## 2021-04-05 DIAGNOSIS — Z91.030 BEE ALLERGY STATUS: Chronic | ICD-10-CM

## 2021-04-05 DIAGNOSIS — Z00.00 ENCOUNTER FOR HEALTH MAINTENANCE EXAMINATION IN ADULT: Primary | ICD-10-CM

## 2021-04-05 DIAGNOSIS — G43.809 HEADACHE, VARIANT MIGRAINE: ICD-10-CM

## 2021-04-05 PROCEDURE — 3008F BODY MASS INDEX DOCD: CPT | Performed by: ANESTHESIOLOGY

## 2021-04-05 PROCEDURE — 3725F SCREEN DEPRESSION PERFORMED: CPT | Performed by: FAMILY MEDICINE

## 2021-04-05 PROCEDURE — 99396 PREV VISIT EST AGE 40-64: CPT | Performed by: FAMILY MEDICINE

## 2021-04-05 RX ORDER — EPINEPHRINE 0.3 MG/.3ML
0.3 INJECTION SUBCUTANEOUS AS NEEDED
Qty: 2 EACH | Refills: 1 | Status: SHIPPED | OUTPATIENT
Start: 2021-04-05

## 2021-04-05 NOTE — PROGRESS NOTES
FAMILY PRACTICE OFFICE VISIT       NAME: María Nettles  AGE: 39 y o  SEX: female       : 1975        MRN: [id number]        Assessment and Plan     Problem List Items Addressed This Visit        Digestive    Gastroesophageal reflux disease (Chronic)     ·  EGD 2020, a 2 cm hiatal hernia, patient uses Pepcid p r n , I suggested trial of H2 blocker at bedtime on a regular basis         Colon polyp (Chronic)     ·  Strong family history of colon cancer  · Colonoscopy 2020 revealing colon polyp sessile serrated adenoma  ·  Patient will follow-up with [facility] for repeat colonoscopy in 6 months            Endocrine    Hypothyroidism     ·  Continue levothyroxine 75 mcg daily, pending TSH         Relevant Orders    Lipid Panel with Direct LDL reflex    TSH, 3rd generation       Cardiovascular and Mediastinum    Headache, variant migraine     PRN imitrex, patient follows with [facility]         Relevant Orders    CBC and differential    Comprehensive metabolic panel       Other    Bee allergy status (Chronic)    Relevant Medications    EPINEPHrine (EpiPen 2-Sukhdev) 0 3 mg/0 3 mL SOAJ      Other Visit Diagnoses     Encounter for health maintenance examination in adult    -  Primary    Vitamin D deficiency        Relevant Orders    Vitamin D 25 hydroxy      Patient presents for follow-up of chronic medical conditions /annual well exam     She is up-to-date with health screenings and remains under ongoing care of Gynecology, Gastroenterology, Neurology, and ENT  Patient reports recent diagnosis of hiatal hernia and intermittent symptoms of acid reflux  She takes Pepcid at night on an as-needed basis  Patient admits to chronic symptoms of insomnia, lack of improvement on melatonin, patient is planning to try CBD gummies  I suspect that breakthrough reflux symptoms could be contributing to her poor sleep    I advised her to start taking Pepcid on a daily basis and try Xyzal 5 mg q h s  as this medication can alleviate chronic symptoms of allergic rhinitis and provide patient with beneficial sedating side effects  If insomnia symptoms persist- patient will contact me with an update  Patient remains under ongoing care of [facility] due to finding of nonspecific white matter brain changes  She reports intermittent symptoms of migraine headaches usually in between menstrual cycles  Patient will discuss birth control pill management with Neurology and Gynecology  She uses Imitrex on an as-needed basis  Patient will proceed with routine blood work  She follows very healthy diet and active lifestyle, she exercises regularly and denies any exertional symptoms  follow up pending labs, updates, annually and as needed  There are no Patient Instructions on file for this visit  Discussed with the patient and all questioned fully answered  She will call me if any problems arise  M*Modal software was used to dictate this note  It may contain errors with dictating incorrect words/spelling  Please contact provider directly with any questions  Chief Complaint     Chief Complaint   Patient presents with    Physical Exam       History of Present Illness     Annual  well exam   patient has been feeling generally well and stably  No complaints today  She has received  1st dose of COVID-19 vaccination   Dr Christina-Dermatology   Dr Medina-  GYN - saw 2 weeks ago   Dr Burton - follows hearing test, sinus procedure in the office- pending f/up    Dr Davis -follows  MRI annually     BCP - 3 months without break- usually develops HA in between cycles  appetite is normal   Sleep is fair  Patient usually wakes up once a night to go to the bathroom but is able to fall back asleep  She has been practicing yoga before bed and it helps with restful sleep  Patient has tried melatonin without any improvement      Patient would like to try CBD gummies but will contact me if they are not helpful for her insomnia symptoms  Patient is under care of [facility]  EGD 2 cm   9/2020, Dx with fungal infection    Colonoscopy-  Few weeks ago- due to FHx colon CA,Finding of polyp, patient will be due for another colonoscopy in 6 months  Overall symptoms of reflux have improved with p r n  Pepcid  I suggested that patient rises medication on a daily basis due to recurrent symptoms and recent diagnosis of hiatal hernia  Review of Systems   Review of Systems   Constitutional: Negative  HENT: Positive for hearing loss  Eyes: Negative  Respiratory: Negative  Cardiovascular: Negative  Gastrointestinal: Negative  Endocrine: Negative  Genitourinary: Negative  Musculoskeletal: Negative  Skin: Negative  Allergic/Immunologic: Negative  Neurological: Positive for headaches ( as outlined in HPI)  Hematological: Negative  Psychiatric/Behavioral: Positive for sleep disturbance ( chronic insomnia)         Active Problem List     Patient Active Problem List   Diagnosis    Hypothyroidism    Abnormal MRI of head    Hearing loss    Headache, variant migraine    Nasal septal deviation    Gastroesophageal reflux disease    Nasal congestion    Bee allergy status    Colon polyp       Past Medical History:  Past Medical History:   Diagnosis Date    Cluster headache        Past Surgical History:  Past Surgical History:   Procedure Laterality Date    ANKLE SURGERY Left 03/20    HERNIA REPAIR Left 09/2014    Sports Hernia Repair    WRIST SURGERY Right 03/2009    Dr Donald Lugo Right 02/07/2020       Family History:  Family History   Problem Relation Age of Onset    Hypothyroidism Mother    Megan Clunes Migraines Mother     Hyperlipidemia Father         Pure    Stomach cancer Paternal Grandmother 80    Ovarian cancer Maternal Grandmother 48        over age 48   Megan Clunes No Known Problems Sister     Lymphoma Maternal Grandfather 80    No Known Problems Paternal Grandfather     Prostate cancer Maternal Uncle     Prostate cancer Paternal Uncle        Social History:  Social History     Socioeconomic History    Marital status: /Civil Union     Spouse name: Not on file    Number of children: Not on file    Years of education: Not on file    Highest education level: Not on file   Occupational History    Not on file   Social Needs    Financial resource strain: Not on file    Food insecurity     Worry: Not on file     Inability: Not on file    Transportation needs     Medical: Not on file     Non-medical: Not on file   Tobacco Use    Smoking status: Never Smoker    Smokeless tobacco: Never Used   Substance and Sexual Activity    Alcohol use: Yes     Comment: Social     Drug use: No    Sexual activity: Not on file   Lifestyle    Physical activity     Days per week: Not on file     Minutes per session: Not on file    Stress: Not on file   Relationships    Social connections     Talks on phone: Not on file     Gets together: Not on file     Attends Restoration service: Not on file     Active member of club or organization: Not on file     Attends meetings of clubs or organizations: Not on file     Relationship status: Not on file    Intimate partner violence     Fear of current or ex partner: Not on file     Emotionally abused: Not on file     Physically abused: Not on file     Forced sexual activity: Not on file   Other Topics Concern    Not on file   Social History Narrative    Caffeine use           Objective     Vitals:    04/05/21 0726   BP: 98/56   BP Location: Left arm   Patient Position: Sitting   Cuff Size: Adult   Pulse: 85   Resp: 14   Temp: 97 8 °F (36 6 °C)   TempSrc: Temporal   SpO2: 100%   Weight: 45 9 kg (101 lb 3 2 oz)   Height: 5' 0 75" (1 543 m)     Wt Readings from Last 3 Encounters:   04/05/21 45 9 kg (101 lb 3 2 oz)   03/16/21 46 3 kg (102 lb)   03/01/21 46 3 kg (102 lb)       Physical Exam  Vitals signs and nursing note reviewed  Constitutional:       General: She is not in acute distress  Appearance: Normal appearance  She is well-developed  She is not ill-appearing  HENT:      Head: Normocephalic and atraumatic  Eyes:      General: No scleral icterus  Conjunctiva/sclera: Conjunctivae normal    Neck:      Musculoskeletal: Normal range of motion and neck supple  No neck rigidity  Thyroid: No thyromegaly  Vascular: No carotid bruit  Cardiovascular:      Rate and Rhythm: Normal rate and regular rhythm  Heart sounds: Normal heart sounds  No murmur  Pulmonary:      Effort: Pulmonary effort is normal  No respiratory distress  Breath sounds: Normal breath sounds  No wheezing  Abdominal:      General: Bowel sounds are normal  There is no distension or abdominal bruit  Palpations: Abdomen is soft  Tenderness: There is no abdominal tenderness  Musculoskeletal: Normal range of motion  Right lower leg: No edema  Left lower leg: No edema  Skin:     General: Skin is warm  Findings: No rash  Neurological:      Mental Status: She is alert and oriented to person, place, and time  Cranial Nerves: No cranial nerve deficit  Coordination: Coordination normal    Psychiatric:         Mood and Affect: Mood normal          Behavior: Behavior normal          Thought Content:  Thought content normal          Pertinent Laboratory/Diagnostic Studies:  Lab Results   Component Value Date    GLUCOSE 72 11/02/2015    BUN 10 06/04/2018    CREATININE 0 80 06/04/2018    CALCIUM 8 8 06/04/2018     10/19/2016    K 4 1 06/04/2018    CO2 24 06/04/2018     06/04/2018     Lab Results   Component Value Date    ALT 16 03/28/2019    AST 16 03/28/2019    ALKPHOS 40 03/28/2019    BILITOT 0 4 10/19/2016       Lab Results   Component Value Date    WBC 5 71 06/04/2018    HGB 13 0 06/04/2018    HCT 40 7 06/04/2018    MCV 98 06/04/2018     06/04/2018       Lab Results   Component Value Date    TSH 2 87 06/02/2020       Lab Results   Component Value Date    CHOL 200 10/19/2016     Lab Results   Component Value Date    TRIG 62 03/28/2019     Lab Results   Component Value Date    HDL 70 03/28/2019     Lab Results   Component Value Date    LDLCALC 109 (H) 03/28/2019     Lab Results   Component Value Date    HGBA1C 5 5 10/19/2016       Results for orders placed or performed during the hospital encounter of 03/16/21   Tissue Exam   Result Value Ref Range    Case Report       Surgical Pathology Report                         Case: [id number]                                   Authorizing Provider:  Kay Montero MD       Collected:           03/16/2021 0854              Ordering Location:     [facility]        Received:            03/16/2021 [facility]                                                     Pathologist:           Rober Alexandra DO                                                     Specimen:    Polyp, Colorectal, Cecum polyp-hot snare                                                   Final Diagnosis       A  Colon, Cecum (Polyp), Biopsy:  - Sessile serrated lesion (sessile serrated adenoma)  Additional Information       All reported additional testing was performed with appropriately reactive controls  These tests were developed and their performance characteristics determined by [facility] or appropriate performing facility, though some tests may be performed on tissues which have not been validated for performance characteristics (such as staining performed on alcohol exposed cell blocks and decalcified tissues)  Results should be interpreted with caution and in the context of the patients clinical condition  These tests may not be cleared or approved by the [place]   Food and Drug Administration, though the FDA has determined that such clearance or approval is not necessary  These tests are used for clinical purposes and they should not be regarded as investigational or for research  This laboratory has been approved by [place] [id number], designated as a high-complexity laboratory and is qualified to perform these tests  Interpretation performed at [facility], [address]         (COLON/RECTUM POLYP FORM - GI - All Specimens)             :    Serrated Adenoma      Gross Description          A  The specimen is received in formalin, labeled with the patient's name and hospital number, and is designated " polyp colorectal, cecum polyp  The specimen consists of multiple tan pink irregularly-shaped, friable soft tissue fragments measuring in aggregate of 0 6 x 0 3 x 0 2 cm  Also in the specimen container is a tan brown polypoid tissue fragment, not included in the previous measurements measuring 0 6 x 0 5 x 0 3 cm blue at margin of resection and is bisected revealing grossly unremarkable cut surfaces  The specimen is entirely submitted in a screened cassette  Note: The estimated total formalin fixation time based upon information provided by the submitting clinician and the standard processing schedule is under 72 hours      Ernie               Clinical Information polyp        Orders Placed This Encounter   Procedures    CBC and differential    Comprehensive metabolic panel    Lipid Panel with Direct LDL reflex    TSH, 3rd generation    Vitamin D 25 hydroxy       ALLERGIES:  Allergies   Allergen Reactions    Codeine      Other reaction(s): Palpitations  Reaction Date: 03Aug2011;     Molds & Smuts        Current Medications     Current Outpatient Medications   Medication Sig Dispense Refill    levothyroxine 75 mcg tablet Take 1 tablet (75 mcg total) by mouth daily 90 tablet 3    LO LOESTRIN FE 1 MG-10 MCG / 10 MCG TABS Take 1 tablet by mouth daily        Loratadine (CLARITIN) 10 MG CAPS Take by mouth      multivitamin (THERAGRAN) TABS Take 1 tablet by mouth daily      SUMAtriptan (IMITREX) 25 mg tablet TAKE 1 TAB AT ONSET OF MIGRAINE, MAY REPEAT X 1 IN 2HRS IF NEEDED 9 tablet 3    SUMAtriptan (IMITREX) 50 mg tablet TAKE 1 TABLET AT THE ONSET OF A MIGRAINE, MAY REPEAT IN 2 HOURS IF NEEDED 9 tablet 3    EPINEPHrine (EpiPen 2-Abdoul) 0 3 mg/0 3 mL SOAJ Inject 0 3 mL (0 3 mg total) into a muscle as needed for anaphylaxis 2 each 1    famotidine (PEPCID) 40 MG tablet TAKE 1 TABLET BY MOUTH EVERY DAY 90 tablet 1     No current facility-administered medications for this visit          Medications Discontinued During This Encounter   Medication Reason    Cholecalciferol (VITAMIN D3) 1000 units CAPS     fluticasone (FLONASE) 50 mcg/act nasal spray     predniSONE 10 mg tablet     EPINEPHrine (EPIPEN 2-ABDOUL) 0 3 mg/0 3 mL SOAJ Reorder       Health Maintenance     Health Maintenance   Topic Date Due    PT PLAN OF CARE  10/06/2018    Annual Physical  02/18/2020    Cervical Cancer Screening  10/22/2020    MAMMOGRAM  07/13/2021    Colonoscopy Surveillance  09/16/2021    Depression Screening PHQ  04/05/2022    BMI: Adult  04/05/2022    DTaP,Tdap,and Td Vaccines (3 - Td) 12/15/2025    HIV Screening  Completed    Influenza Vaccine  Completed    Pneumococcal Vaccine: Pediatrics (0 to 5 Years) and At-Risk Patients (6 to 59 Years)  Aged Out    HIB Vaccine  Aged Out    Hepatitis B Vaccine  Aged Out    IPV Vaccine  Aged Out    Hepatitis A Vaccine  Aged Out    Meningococcal ACWY Vaccine  Aged Out    HPV Vaccine  Aged [employer] History   Administered Date(s) Administered    Influenza, injectable, quadrivalent, preservative free 0 5 mL 11/16/2020    Tdap 09/18/2013, 12/15/2015       Steffanie Osler, MD

## 2021-04-06 DIAGNOSIS — K21.9 GASTROESOPHAGEAL REFLUX DISEASE WITHOUT ESOPHAGITIS: ICD-10-CM

## 2021-04-06 RX ORDER — FAMOTIDINE 40 MG/1
TABLET, FILM COATED ORAL
Qty: 90 TABLET | Refills: 1 | Status: SHIPPED | OUTPATIENT
Start: 2021-04-06 | End: 2021-05-03

## 2021-04-08 DIAGNOSIS — Z23 ENCOUNTER FOR IMMUNIZATION: ICD-10-CM

## 2021-04-11 PROBLEM — K63.5 COLON POLYP: Chronic | Status: ACTIVE | Noted: 2021-04-11

## 2021-04-11 PROBLEM — K21.9 GASTROESOPHAGEAL REFLUX DISEASE: Chronic | Status: ACTIVE | Noted: 2020-11-02

## 2021-04-11 NOTE — ASSESSMENT & PLAN NOTE
·  Strong family history of colon cancer      · Colonoscopy March 2020 revealing colon polyp sessile serrated adenoma  ·  Patient will follow-up with [facility] for repeat colonoscopy in 6 months

## 2021-04-11 NOTE — ASSESSMENT & PLAN NOTE
·  EGD September 2020, a 2 cm hiatal hernia, patient uses Pepcid p r n , I suggested trial of H2 blocker at bedtime on a regular basis

## 2021-04-28 ENCOUNTER — TRANSCRIBE ORDERS (OUTPATIENT)
Dept: PAIN MEDICINE | Facility: CLINIC | Age: 46
End: 2021-04-28

## 2021-04-28 ENCOUNTER — TELEPHONE (OUTPATIENT)
Dept: PAIN MEDICINE | Facility: CLINIC | Age: 46
End: 2021-04-28

## 2021-04-28 ENCOUNTER — CONSULT (OUTPATIENT)
Dept: PAIN MEDICINE | Facility: CLINIC | Age: 46
End: 2021-04-28
Payer: COMMERCIAL

## 2021-04-28 VITALS
BODY MASS INDEX: 19.24 KG/M2 | HEART RATE: 84 BPM | DIASTOLIC BLOOD PRESSURE: 60 MMHG | SYSTOLIC BLOOD PRESSURE: 102 MMHG | WEIGHT: 101 LBS

## 2021-04-28 DIAGNOSIS — M50.120 CERVICAL DISC DISORDER WITH RADICULOPATHY OF MID-CERVICAL REGION: ICD-10-CM

## 2021-04-28 DIAGNOSIS — M79.18 MYOFASCIAL PAIN SYNDROME: Primary | ICD-10-CM

## 2021-04-28 DIAGNOSIS — M25.519 TRIGGER POINT OF SHOULDER REGION, UNSPECIFIED LATERALITY: ICD-10-CM

## 2021-04-28 PROCEDURE — 99204 OFFICE O/P NEW MOD 45 MIN: CPT | Performed by: ANESTHESIOLOGY

## 2021-04-28 PROCEDURE — 1036F TOBACCO NON-USER: CPT | Performed by: ANESTHESIOLOGY

## 2021-04-28 NOTE — PATIENT INSTRUCTIONS
Neck Exercises   WHAT YOU NEED TO KNOW:   Why is it important to do neck exercises? Neck exercises help reduce neck pain, and improve neck movement and strength  Neck exercises also help prevent long-term neck problems  What do I need to know about neck exercises? · Do the exercises every day,  or as often as directed by your healthcare provider  · Move slowly, gently, and smoothly  Avoid fast or jerky motions  · Stand and sit the way your healthcare provider shows you  Good posture may reduce your neck pain  Check your posture often, even when you are not doing your neck exercises  How do I perform neck exercises safely? · Exercise position:  You may sit or stand while you do neck exercises  Face forward  Your shoulders should be straight and relaxed, with a good posture  · Head tilts, forward and back:  Gently bow your head and try to touch your chin to your chest  Your healthcare provider may tell you to push on the back of your neck to help bow your head  Raise your chin back to the starting position  Tilt your head back as far as possible so you are looking up at the ceiling  Your healthcare provider may tell you to lift your chin to help tilt your head back  Return your head to the starting position  · Head tilts, side to side:  Tilt your head, bringing your ear toward your shoulder  Then tilt your head toward the other shoulder  · Head turns:  Turn your head to look over your shoulder  Tilt your chin down and try to touch it to your shoulder  Do not raise your shoulder to your chin  Face forward again  Do the same on the other side  · Head rolls:  Slowly bring your chin toward your chest  Next, roll your head to the right  Your ear should be positioned over your shoulder  Hold this position for 5 seconds  Roll your head back toward your chest and to the left into the same position  Hold for 5 seconds   Gently roll your head back and around in a clockwise Native 3 times  Next, move your head in the reverse direction (counterclockwise) in a [place] 3 times  Do not shrug your shoulders upwards while you do this exercise  When should I contact my healthcare provider? · Your pain does not get better, or gets worse  · You have questions or concerns about your condition, care, or exercise program     CARE AGREEMENT:   You have the right to help plan your care  Learn about your health condition and how it may be treated  Discuss treatment options with your healthcare providers to decide what care you want to receive  You always have the right to refuse treatment  The above information is an  only  It is not intended as medical advice for individual conditions or treatments  Talk to your doctor, nurse or pharmacist before following any medical regimen to see if it is safe and effective for you  © Copyright [address] Information is for End User's use only and may not be sold, redistributed or otherwise used for commercial purposes   All illustrations and images included in CareNotes® are the copyrighted property of A D A M , Inc  or [address] [place] [address]

## 2021-04-28 NOTE — PROGRESS NOTES
Assessment  1  Myofascial pain syndrome    2  Trigger point of shoulder region, unspecified laterality    3  Cervical disc disorder with radiculopathy of mid-cervical region        Plan  The patient's symptoms, history/physical are consistent with pain that is multifactorial in origin but predominantly the result of widespread myofascial pain syndrome leading to significant spasms in the right upper back, shoulder and neck as well as a cervical radiculopathy down the right arm  At this time, I discussed treatment that will be multimodal in approach  I will have her resume physical therapy for myofascial release which she had been doing prior to the pandemic  In addition, I will have her scheduled for trigger point injections in the office to help reduce the spasms  She will follow back up in 6 weeks for re-evaluation as well  My impressions and treatment recommendations were discussed in detail with the patient who verbalized understanding and had no further questions  Discharge instructions were provided  I personally saw and examined the patient and I agree with the above discussed plan of care  Orders Placed This Encounter   Procedures    Ambulatory referral to Physical Therapy     Standing Status:   Future     Standing Expiration Date:   4/28/2022     Referral Priority:   Routine     Referral Type:   Physical Therapy     Referral Reason:   Specialty Services Required     Requested Specialty:   Physical Therapy     Number of Visits Requested:   1     Expiration Date:   4/28/2022     No orders of the defined types were placed in this encounter  History of Present Illness    Lilian Nettles is a 39 y o  female referred by Dr Edmond Miller who presents for consultation in regards to right-sided neck and headache symptoms  Pain is been present for many years  Symptoms are moderate rated 1-4/10 on numeric rating scale and felt intermittently    Symptoms are worse in the afternoon evening described to be pressure-like, throbbing, dull/aching  Pain symptoms are aggravated with sneezing  There is no change with coughing or bowel movements  She gets occasional numbness and tingling down the right arm  Treatment history has included physical therapy and osteopathic manipulation which have provided moderate relief  Heat/ice has provided moderate relief  Use of ibuprofen provides no relief  I have personally reviewed and/or updated the patient's past medical history, past surgical history, family history, social history, current medications, allergies, and vital signs today  Review of Systems   Constitutional: Negative for fever and unexpected weight change  HENT: Negative for trouble swallowing  Eyes: Negative for visual disturbance  Respiratory: Negative for shortness of breath and wheezing  Cardiovascular: Negative for chest pain and palpitations  Gastrointestinal: Negative for constipation, diarrhea, nausea and vomiting  Endocrine: Negative for cold intolerance, heat intolerance and polydipsia  Genitourinary: Negative for difficulty urinating and frequency  Musculoskeletal: Positive for neck pain and neck stiffness  Negative for arthralgias, gait problem, joint swelling and myalgias  Skin: Negative for rash  Neurological: Positive for headaches  Negative for dizziness, seizures, syncope and weakness  Hematological: Does not bruise/bleed easily  Psychiatric/Behavioral: Negative for dysphoric mood  All other systems reviewed and are negative        Patient Active Problem List   Diagnosis    Hypothyroidism    Abnormal MRI of head    Hearing loss    Headache, variant migraine    Nasal septal deviation    Gastroesophageal reflux disease    Nasal congestion    Bee allergy status    Colon polyp       Past Medical History:   Diagnosis Date    Cluster headache        Past Surgical History:   Procedure Laterality Date    ANKLE SURGERY Left 03/20    HERNIA REPAIR Left 09/2014    Sports Hernia Repair    WRIST SURGERY Right 03/2009    Dr Taylor Kingsley Right 02/07/2020       Family History   Problem Relation Age of Onset    Hypothyroidism Mother     Migraines Mother     Hyperlipidemia Father         Pure    Stomach cancer Paternal Grandmother 80    Ovarian cancer Maternal Grandmother 48        over age 48   Ardeth Needs No Known Problems Sister     Lymphoma Maternal Grandfather 80    No Known Problems Paternal Grandfather     Prostate cancer Maternal Uncle     Prostate cancer Paternal Uncle        Social History     Occupational History    Not on file   Tobacco Use    Smoking status: Never Smoker    Smokeless tobacco: Never Used   Substance and Sexual Activity    Alcohol use: Yes     Comment: Social     Drug use: No    Sexual activity: Not on file       Current Outpatient Medications on File Prior to Visit   Medication Sig    EPINEPHrine (EpiPen 2-Sukhdev) 0 3 mg/0 3 mL SOAJ Inject 0 3 mL (0 3 mg total) into a muscle as needed for anaphylaxis    famotidine (PEPCID) 40 MG tablet TAKE 1 TABLET BY MOUTH EVERY DAY    levothyroxine 75 mcg tablet Take 1 tablet (75 mcg total) by mouth daily    LO LOESTRIN FE 1 MG-10 MCG / 10 MCG TABS Take 1 tablet by mouth daily      Loratadine (CLARITIN) 10 MG CAPS Take by mouth    multivitamin (THERAGRAN) TABS Take 1 tablet by mouth daily    SUMAtriptan (IMITREX) 25 mg tablet TAKE 1 TAB AT ONSET OF MIGRAINE, MAY REPEAT X 1 IN 2HRS IF NEEDED    SUMAtriptan (IMITREX) 50 mg tablet TAKE 1 TABLET AT THE ONSET OF A MIGRAINE, MAY REPEAT IN 2 HOURS IF NEEDED     No current facility-administered medications on file prior to visit          Allergies   Allergen Reactions    Codeine      Other reaction(s): Palpitations  Reaction Date: 03Aug2011;     Molds & Smuts        Physical Exam    /60   Pulse 84   Wt 45 8 kg (101 lb)   BMI 19 24 kg/m²     Constitutional: normal, well developed, well nourished, alert, in no distress and non-toxic and no overt pain behavior  Eyes: anicteric  HEENT: grossly intact  Neck: supple, symmetric, trachea midline and no masses   Pulmonary:even and unlabored  Cardiovascular:No edema or pitting edema present  Skin:Normal without rashes or lesions and well hydrated  Psychiatric:Mood and affect appropriate  Neurologic:Cranial Nerves II-XII grossly intact  Musculoskeletal:normal     Cervical Spine Exam  Appearance:  Normal lordosis  Palpation/Tenderness:  right cervical paraspinal tenderness  right trapezium tenderness  Range of Motion:  Flexion:  No limitation  with pain  Extension:  No limitation  with pain  Lateral Flexion - Left:  No limitation  without pain  Lateral Flexion - Right:  No limitation  with pain  Rotation - Left:  No limitation  without pain  Rotation - Right:  No limitation  with pain  Motor Strength:  Left Arm Flexion  5/5  Left Arm Extension  5/5  Right Arm Flexion  5/5  Right Arm Extension  5/5  Left Wrist Flexion  5/5  Left Wrist Extension  5/5  Left Finger Abduction  5/5  Right Finger Abduction  5/5  Left Pincer Grasp  5/5  Right Pincer Grasp  5/5  Reflexes:  Left Biceps:  2+   Right Biceps:  2+   Left Triceps:  2+   Right Triceps:  2+     Imaging        MRI BRAIN WITH AND WITHOUT CONTRAST      INDICATION: R93 0: Abnormal findings on diagnostic imaging of skull and head, not elsewhere classified      COMPARISON:  None      TECHNIQUE:  Sagittal T1, axial T2, axial FLAIR, axial T1, axial Gifford, axial diffusion  Sagittal, axial T1 postcontrast   Axial bravo postcontrast with coronal reconstructions           IV Contrast:  4 8 mL of gadobutrol injection (MULTI-DOSE)      IMAGE QUALITY:   Diagnostic      FINDINGS:     BRAIN PARENCHYMA:  There is no discrete mass, mass effect or midline shift  There is no intracranial hemorrhage  Normal posterior fossa  Diffusion imaging is unremarkable        A few nonspecific white matter foci are again identified in the bifrontal white matter    These are nonspecific     Postcontrast imaging of the brain demonstrates no abnormal enhancement      VENTRICLES:  Normal for the patient's age      SELLA AND PITUITARY GLAND:  Normal      ORBITS:  Normal     PARANASAL SINUSES:  Normal      VASCULATURE:  Evaluation of the major intracranial vasculature demonstrates appropriate flow voids      CALVARIUM AND SKULL BASE:  Normal      EXTRACRANIAL SOFT TISSUES:  Normal      IMPRESSION:     Stable MRI of the brain with a few scattered nonspecific white matter foci in the bifrontal subcortical white matter potentially related, gated migraine headaches  Other etiologies not excluded    Otherwise, normal MRI of the brain

## 2021-04-29 PROBLEM — Z86.69 HISTORY OF SUDDEN HEARING LOSS: Status: ACTIVE | Noted: 2021-04-29

## 2021-05-01 DIAGNOSIS — K21.9 GASTROESOPHAGEAL REFLUX DISEASE WITHOUT ESOPHAGITIS: ICD-10-CM

## 2021-05-03 ENCOUNTER — PROCEDURE VISIT (OUTPATIENT)
Dept: PAIN MEDICINE | Facility: CLINIC | Age: 46
End: 2021-05-03
Payer: COMMERCIAL

## 2021-05-03 ENCOUNTER — TRANSCRIBE ORDERS (OUTPATIENT)
Dept: PAIN MEDICINE | Facility: CLINIC | Age: 46
End: 2021-05-03

## 2021-05-03 VITALS — HEART RATE: 78 BPM | SYSTOLIC BLOOD PRESSURE: 110 MMHG | DIASTOLIC BLOOD PRESSURE: 62 MMHG

## 2021-05-03 DIAGNOSIS — M79.18 MYOFASCIAL PAIN SYNDROME: Primary | ICD-10-CM

## 2021-05-03 PROCEDURE — 20552 NJX 1/MLT TRIGGER POINT 1/2: CPT | Performed by: ANESTHESIOLOGY

## 2021-05-03 RX ORDER — BUPIVACAINE HYDROCHLORIDE 2.5 MG/ML
10 INJECTION, SOLUTION EPIDURAL; INFILTRATION; INTRACAUDAL ONCE
Status: COMPLETED | OUTPATIENT
Start: 2021-05-03 | End: 2021-05-03

## 2021-05-03 RX ORDER — FAMOTIDINE 40 MG/1
TABLET, FILM COATED ORAL
Qty: 90 TABLET | Refills: 1 | Status: SHIPPED | OUTPATIENT
Start: 2021-05-03 | End: 2021-06-04

## 2021-05-03 RX ADMIN — BUPIVACAINE HYDROCHLORIDE 10 ML: 2.5 INJECTION, SOLUTION EPIDURAL; INFILTRATION; INTRACAUDAL at 11:25

## 2021-05-03 NOTE — PROGRESS NOTES
Pre-procedure Diagnosis:   Myofascial pain  Post-procedure Diagnosis:   Myofascial pain  Operation Title(s):  Trigger point injections into right trapezius x1, right rhomboid x1, right splenius capitis x1  Attending Surgeon:   Melanie Rosales MD  Anesthesia:   Local    Indications: The patient is a 39y o  year-old female with a diagnosis of myofascial pain  The patient's history and physical exam were reviewed  The risks, benefits and alternatives to the procedure were discussed, and all questions were answered to the patient's satisfaction  The patient agreed to proceed, and written informed consent was obtained  Procedure in Detail: The patient was brought into the exam room and placed in the sitting position on the exam room chair with the head flexed on a pillow  Trigger points were identified in the:right trapezius x1, right rhomboid x1, right splenius capitis x1    Areas were cleansed with alcohol  Then, using a dry needling technique, each trigger point was injected with a 1 5 inch 25 gauge needle and 1 mL 0 25% bupivacaine     Disposition: The patient tolerated the procedure well and there were no apparent complications  The patient was given written discharge instructions for the procedure

## 2021-05-06 LAB
25(OH)D3 SERPL-MCNC: 24 NG/ML (ref 30–100)
ALBUMIN SERPL-MCNC: 4 G/DL (ref 3.6–5.1)
ALBUMIN/GLOB SERPL: 1.7 (CALC) (ref 1–2.5)
ALP SERPL-CCNC: 43 U/L (ref 31–125)
ALT SERPL-CCNC: 16 U/L (ref 6–29)
AST SERPL-CCNC: 16 U/L (ref 10–35)
BASOPHILS # BLD AUTO: 52 CELLS/UL (ref 0–200)
BASOPHILS NFR BLD AUTO: 1 %
BILIRUB SERPL-MCNC: 0.4 MG/DL (ref 0.2–1.2)
BUN SERPL-MCNC: 8 MG/DL (ref 7–25)
BUN/CREAT SERPL: NORMAL (CALC) (ref 6–22)
CALCIUM SERPL-MCNC: 9 MG/DL (ref 8.6–10.2)
CHLORIDE SERPL-SCNC: 103 MMOL/L (ref 98–110)
CHOLEST SERPL-MCNC: 211 MG/DL
CHOLEST/HDLC SERPL: 3.2 (CALC)
CO2 SERPL-SCNC: 28 MMOL/L (ref 20–32)
CREAT SERPL-MCNC: 0.71 MG/DL (ref 0.5–1.1)
EOSINOPHIL # BLD AUTO: 62 CELLS/UL (ref 15–500)
EOSINOPHIL NFR BLD AUTO: 1.2 %
ERYTHROCYTE [DISTWIDTH] IN BLOOD BY AUTOMATED COUNT: 12 % (ref 11–15)
GLOBULIN SER CALC-MCNC: 2.3 G/DL (CALC) (ref 1.9–3.7)
GLUCOSE SERPL-MCNC: 88 MG/DL (ref 65–99)
HCT VFR BLD AUTO: 38.6 % (ref 35–45)
HDLC SERPL-MCNC: 66 MG/DL
HGB BLD-MCNC: 12.6 G/DL (ref 11.7–15.5)
LDLC SERPL CALC-MCNC: 125 MG/DL (CALC)
LYMPHOCYTES # BLD AUTO: 1950 CELLS/UL (ref 850–3900)
LYMPHOCYTES NFR BLD AUTO: 37.5 %
MCH RBC QN AUTO: 31.7 PG (ref 27–33)
MCHC RBC AUTO-ENTMCNC: 32.6 G/DL (ref 32–36)
MCV RBC AUTO: 97.2 FL (ref 80–100)
MONOCYTES # BLD AUTO: 541 CELLS/UL (ref 200–950)
MONOCYTES NFR BLD AUTO: 10.4 %
NEUTROPHILS # BLD AUTO: 2595 CELLS/UL (ref 1500–7800)
NEUTROPHILS NFR BLD AUTO: 49.9 %
NONHDLC SERPL-MCNC: 145 MG/DL (CALC)
PLATELET # BLD AUTO: 219 THOUSAND/UL (ref 140–400)
PMV BLD REES-ECKER: 12.9 FL (ref 7.5–12.5)
POTASSIUM SERPL-SCNC: 3.8 MMOL/L (ref 3.5–5.3)
PROT SERPL-MCNC: 6.3 G/DL (ref 6.1–8.1)
RBC # BLD AUTO: 3.97 MILLION/UL (ref 3.8–5.1)
SL AMB EGFR AFRICAN AMERICAN: 119 ML/MIN/1.73M2
SL AMB EGFR NON AFRICAN AMERICAN: 103 ML/MIN/1.73M2
SODIUM SERPL-SCNC: 136 MMOL/L (ref 135–146)
TRIGL SERPL-MCNC: 101 MG/DL
TSH SERPL-ACNC: 3.83 MIU/L
WBC # BLD AUTO: 5.2 THOUSAND/UL (ref 3.8–10.8)

## 2021-05-07 ENCOUNTER — TELEPHONE (OUTPATIENT)
Dept: FAMILY MEDICINE CLINIC | Facility: CLINIC | Age: 46
End: 2021-05-07

## 2021-05-07 NOTE — TELEPHONE ENCOUNTER
Please contact patient  I reviewed her blood work  It was all normal aside from slightly elevated cholesterol at 211 and decreased level of vitamin-D at 24  · Please advise patient to start low-fat low-cholesterol diet  ·  if patient is not using any vitamin-D supplements-she should start vitamin D3 capsules over-the-counter 2000 units daily  If she is already taking vitamin-D -then please let me know what is her dose is    ·  we will plan on repeating blood work in 1 year     thank you

## 2021-05-12 DIAGNOSIS — E03.9 HYPOTHYROIDISM, UNSPECIFIED TYPE: ICD-10-CM

## 2021-05-12 RX ORDER — LEVOTHYROXINE SODIUM 0.07 MG/1
TABLET ORAL
Qty: 90 TABLET | Refills: 3 | Status: SHIPPED | OUTPATIENT
Start: 2021-05-12 | End: 2022-05-11

## 2021-06-03 ENCOUNTER — OFFICE VISIT (OUTPATIENT)
Dept: FAMILY MEDICINE CLINIC | Facility: CLINIC | Age: 46
End: 2021-06-03
Payer: COMMERCIAL

## 2021-06-03 ENCOUNTER — TRANSCRIBE ORDERS (OUTPATIENT)
Dept: ADMINISTRATIVE | Facility: HOSPITAL | Age: 46
End: 2021-06-03

## 2021-06-03 VITALS
DIASTOLIC BLOOD PRESSURE: 70 MMHG | OXYGEN SATURATION: 99 % | HEIGHT: 61 IN | TEMPERATURE: 98 F | BODY MASS INDEX: 19.3 KG/M2 | SYSTOLIC BLOOD PRESSURE: 110 MMHG | RESPIRATION RATE: 18 BRPM | WEIGHT: 102.25 LBS | HEART RATE: 94 BPM

## 2021-06-03 DIAGNOSIS — S76.212A STRAIN OF LEFT GROIN: ICD-10-CM

## 2021-06-03 DIAGNOSIS — Z12.31 SCREENING MAMMOGRAM FOR HIGH-RISK PATIENT: ICD-10-CM

## 2021-06-03 DIAGNOSIS — R10.2 PELVIC PAIN IN FEMALE: Primary | ICD-10-CM

## 2021-06-03 DIAGNOSIS — E78.5 DYSLIPIDEMIA: ICD-10-CM

## 2021-06-03 DIAGNOSIS — D12.4 ADENOMATOUS POLYP OF DESCENDING COLON: Chronic | ICD-10-CM

## 2021-06-03 DIAGNOSIS — Z12.31 BREAST CANCER SCREENING BY MAMMOGRAM: Primary | ICD-10-CM

## 2021-06-03 PROCEDURE — 99214 OFFICE O/P EST MOD 30 MIN: CPT | Performed by: FAMILY MEDICINE

## 2021-06-03 RX ORDER — NABUMETONE 500 MG/1
TABLET, FILM COATED ORAL
Qty: 60 TABLET | Refills: 1 | Status: SHIPPED | OUTPATIENT
Start: 2021-06-03 | End: 2022-05-12

## 2021-06-03 NOTE — PROGRESS NOTES
FAMILY PRACTICE OFFICE VISIT       NAME: Rosemarie Nettles  AGE: 39 y o  SEX: female       : 1975        MRN: [id number]        Assessment and Plan     1  Pelvic pain in female  -     US pelvis complete non OB; Future; Expected date: 2021    2  Adenomatous polyp of descending colon  Assessment & Plan:  Colonoscopy 2021: No evidence of diverticulosis, 20 mm sessile serrated adenoma  Patient denies symptoms of generalized abdominal pain per se, normal bowel movements and appetite   Strong family history of colon cancer  Patient will follow-up with [facility] for repeat colonoscopy in 6 months,  2021      3  Strain of left groin  Assessment & Plan:  Successful surgery for left sports hernia 6-7 years ago  Recurrence of symptoms 4-6 weeks ago  Start anti-inflammatory for 2-3 weeks, then use as needed only  Referral to [facility]    Orders:  -     Ambulatory referral to Orthopedic Surgery; Future  -     nabumetone (RELAFEN) 500 mg tablet; Take 1 tablet twice a day after food as needed for groin pain    4  Dyslipidemia  Assessment & Plan:   Recent blood work reveals total cholesterol of 211 with LDL of 125  Patient will start following healthier  low-fat low-cholesterol diet   Reassess in 6 months    Orders:  -     Lipid Panel with Direct LDL reflex; Future    Patient presents for evaluation of left groin /pelvic pain  She was evaluated for similar symptoms 6-7 years ago and underwent surgical correction of sports hernia  Her symptoms seem to be positional and triggered by certain range of motion  She has no systemic GI symptoms, recent colonoscopy did not reveal any diverticulosis  Patient will start with trial of anti-inflammatory, pelvic ultrasound and evaluation by [facility]  If her symptoms will persist - patient will contact me and will proceed with further workup             There are no Patient Instructions on file for this visit  No follow-ups on file  Discussed with the patient and all questioned fully answered  She will call me if any problems arise  M*Modal software was used to dictate this note  It may contain errors with dictating incorrect words/spelling  Please contact provider directly with any questions  Chief Complaint     Chief Complaint   Patient presents with    LLQ     PAIN  PRESSURE X 1 MONTH        History of Present Illness     Patient presents for evaluation of recurrent discomfort in the left groin area  Patient with a history of sports hernia who had nerve release surgery 6-7 years ago in [place]  Patient [facility] Dr Anish Moreira  At the time patient underwent extensive evaluation with negative CT abdomen and pelvis, colonoscopy and consultations with GI, Gynecology and Orthopedic surgery  Patient has been asymptomatic till 4-6 weeks ago when she started noticing pulling sensation in her left groin again  Patient denies any injury or fall  Pain is somewhat positional and is usually worse after prolonged sitting or sleeping on the left side  Patient reports that pain is somewhat worse with urge to empty her bowels and then slowly improves once patient completes diff occasion  Pain is also occasionally worse with sexual intercourse  Patient is following up with [facility]  Recent colonoscopy in March of 2021 with finding or 20 mm polyp, patient is scheduled for follow-up colonoscopy in October of 2021  Recent colonoscopy did not reveal any evidence of diverticulosis  Patient reports normal appetite  Normal bowel movements  No dyspepsia  Patient remains on birth control pill and follows up with gynCarlos  No vaginal discharge  Patient denies symptoms of low back pain or hip pain per se  No dysuria      Review of Systems   Review of Systems   Constitutional: Negative  HENT: Negative  Eyes: Negative  Respiratory: Negative  Cardiovascular: Negative  Gastrointestinal: Negative  Endocrine: Negative  Genitourinary: Negative  Musculoskeletal: Positive for arthralgias ( left groin discomfort)  Skin: Negative  Allergic/Immunologic: Negative  Neurological: Negative  Hematological: Negative  Psychiatric/Behavioral: Negative          Active Problem List     Patient Active Problem List   Diagnosis    Hypothyroidism    Abnormal MRI of head    Hearing loss    Headache, variant migraine    Nasal septal deviation    Gastroesophageal reflux disease    Nasal congestion    Bee allergy status    Colon polyp    History of sudden hearing loss    Strain of left groin    Dyslipidemia       Past Medical History:  Past Medical History:   Diagnosis Date    Cluster headache        Past Surgical History:  Past Surgical History:   Procedure Laterality Date    ANKLE SURGERY Left 03/20    HERNIA REPAIR Left 09/2014    Sports Hernia Repair    WRIST SURGERY Right 03/2009    Dr Llanos Hy Right 02/07/2020       Family History:  Family History   Problem Relation Age of Onset    Hypothyroidism Mother     Migraines Mother     Hyperlipidemia Father         Pure    Stomach cancer Paternal Grandmother 80    Ovarian cancer Maternal Grandmother 48        over age 48   [place] No Known Problems Sister     Lymphoma Maternal Grandfather 80    No Known Problems Paternal Grandfather     Prostate cancer Maternal Uncle     Prostate cancer Paternal Uncle        Social History:  Social History     Socioeconomic History    Marital status: /Civil Union     Spouse name: Not on file    Number of children: Not on file    Years of education: Not on file    Highest education level: Not on file   Occupational History    Not on file   Social Needs    Financial resource strain: Not on file    Food insecurity     Worry: Not on file     Inability: Not on file   [employer] needs     Medical: Not on file     Non-medical: Not on file   Tobacco Use    Smoking status: Never Smoker    Smokeless tobacco: Never Used   Substance and Sexual Activity    Alcohol use: Yes     Comment: Social     Drug use: No    Sexual activity: Not on file   Lifestyle    Physical activity     Days per week: Not on file     Minutes per session: Not on file    Stress: Not on file   Relationships    Social connections     Talks on phone: Not on file     Gets together: Not on file     Attends [employer] service: Not on file     Active member of club or organization: Not on file     Attends meetings of clubs or organizations: Not on file     Relationship status: Not on file    Intimate partner violence     Fear of current or ex partner: Not on file     Emotionally abused: Not on file     Physically abused: Not on file     Forced sexual activity: Not on file   Other Topics Concern    Not on file   Social History Narrative    Caffeine use         Objective     Vitals:    06/03/21 0728   BP: 110/70   Pulse: 94   Resp: 18   Temp: 98 °F (36 7 °C)   SpO2: 99%   Weight: 46 4 kg (102 lb 4 oz)   Height: 5' 0 75" (1 543 m)       Wt Readings from Last 3 Encounters:   06/03/21 46 4 kg (102 lb 4 oz)   04/29/21 45 8 kg (101 lb)   04/28/21 45 8 kg (101 lb)       Physical Exam  Vitals signs and nursing note reviewed  Constitutional:       General: She is not in acute distress  Appearance: Normal appearance  She is well-developed  HENT:      Head: Normocephalic and atraumatic  Eyes:      Conjunctiva/sclera: Conjunctivae normal    Neck:      Musculoskeletal: Neck supple  Thyroid: No thyromegaly  Vascular: No carotid bruit  Cardiovascular:      Rate and Rhythm: Normal rate and regular rhythm  Heart sounds: Normal heart sounds  No murmur  Pulmonary:      Effort: Pulmonary effort is normal  No respiratory distress  Breath sounds: Normal breath sounds  No wheezing     Abdominal:      General: Abdomen is flat  Bowel sounds are normal  There is no distension or abdominal bruit  Palpations: Abdomen is soft  Tenderness: There is abdominal tenderness in the left lower quadrant  There is no right CVA tenderness, left CVA tenderness, guarding or rebound  Hernia: No hernia is present  Comments:  Tenderness in the lower left quadrant with no rebound or guarding, max tenderness with palpation of left groin, reproducing patient's discomfort  Musculoskeletal: Normal range of motion  Right lower leg: No edema  Left lower leg: No edema  Neurological:      Mental Status: She is alert and oriented to person, place, and time  Cranial Nerves: No cranial nerve deficit        Coordination: Coordination normal    Psychiatric:         Behavior: Behavior normal           Pertinent Laboratory/Diagnostic Studies:    Lab Results   Component Value Date    WBC 5 2 05/05/2021    HGB 12 6 05/05/2021    HCT 38 6 05/05/2021    MCV 97 2 05/05/2021     05/05/2021       Lab Results   Component Value Date    TSH 3 83 05/05/2021       Lab Results   Component Value Date    CHOL 200 10/19/2016     Lab Results   Component Value Date    TRIG 101 05/05/2021     Lab Results   Component Value Date    HDL 66 05/05/2021     Lab Results   Component Value Date    LDLCALC 125 (H) 05/05/2021     Lab Results   Component Value Date    HGBA1C 5 5 10/19/2016     Lab Results   Component Value Date    SODIUM 136 05/05/2021    K 3 8 05/05/2021     05/05/2021    CO2 28 05/05/2021    ANIONGAP 12 11/02/2015    AGAP 9 06/04/2018    BUN 8 05/05/2021    CREATININE 0 71 05/05/2021    GLUC 88 05/05/2021    GLUF 74 06/04/2018    CALCIUM 9 0 05/05/2021    AST 16 05/05/2021    ALT 16 05/05/2021    ALKPHOS 43 05/05/2021    PROT 6 8 10/19/2016    TP 6 3 05/05/2021    BILITOT 0 4 10/19/2016    TBILI 0 4 05/05/2021    EGFR 91 06/04/2018       Orders Placed This Encounter   Procedures    US pelvis complete non OB    Lipid Panel with Direct LDL reflex    Ambulatory referral to Orthopedic Surgery       ALLERGIES:  Allergies   Allergen Reactions    Codeine      Other reaction(s): Palpitations  Reaction Date: 03Aug2011;     Molds & Smuts        Current Medications     Current Outpatient Medications   Medication Sig Dispense Refill    EPINEPHrine (EpiPen 2-Sukhdev) 0 3 mg/0 3 mL SOAJ Inject 0 3 mL (0 3 mg total) into a muscle as needed for anaphylaxis 2 each 1    levothyroxine 75 mcg tablet TAKE 1 TABLET BY MOUTH EVERY DAY 90 tablet 3    LO LOESTRIN FE 1 MG-10 MCG / 10 MCG TABS Take 1 tablet by mouth daily        Loratadine (CLARITIN) 10 MG CAPS Take by mouth      multivitamin (THERAGRAN) TABS Take 1 tablet by mouth daily      SUMAtriptan (IMITREX) 25 mg tablet TAKE 1 TAB AT ONSET OF MIGRAINE, MAY REPEAT X 1 IN 2HRS IF NEEDED 9 tablet 3    SUMAtriptan (IMITREX) 50 mg tablet TAKE 1 TABLET AT THE ONSET OF A MIGRAINE, MAY REPEAT IN 2 HOURS IF NEEDED 9 tablet 3    famotidine (PEPCID) 40 MG tablet TAKE 1 TABLET BY MOUTH EVERY DAY 90 tablet 3    nabumetone (RELAFEN) 500 mg tablet Take 1 tablet twice a day after food as needed for groin pain 60 tablet 1     No current facility-administered medications for this visit  There are no discontinued medications      Health Maintenance     Health Maintenance   Topic Date Due    PT PLAN OF CARE  10/06/2018    Cervical Cancer Screening  10/22/2020    MAMMOGRAM  07/13/2021    Colonoscopy Surveillance  09/16/2021    Depression Screening PHQ  04/05/2022    Annual Physical  04/05/2022    BMI: Adult  06/03/2022    DTaP,Tdap,and Td Vaccines (3 - Td) 12/15/2025    HIV Screening  Completed    Influenza Vaccine  Completed    COVID-19 Vaccine  Completed    Pneumococcal Vaccine: Pediatrics (0 to 5 Years) and At-Risk Patients (6 to 59 Years)  Aged Out    HIB Vaccine  Aged Out    Hepatitis B Vaccine  Aged Out    IPV Vaccine  Aged Out    Hepatitis A Vaccine  Aged Out    Meningococcal ACWY Vaccine  Aged Out    HPV Vaccine  Aged Out       Immunization History   Administered Date(s) Administered    Influenza, injectable, quadrivalent, preservative free 0 5 mL 11/16/2020    SARS-CoV-2 / COVID-19 mRNA IM (Pfizer-BioNTech) 03/29/2021, 04/19/2021    Tdap 09/18/2013, 12/15/2015       Emmanuel Carver MD

## 2021-06-04 DIAGNOSIS — K21.9 GASTROESOPHAGEAL REFLUX DISEASE WITHOUT ESOPHAGITIS: ICD-10-CM

## 2021-06-04 RX ORDER — FAMOTIDINE 40 MG/1
TABLET, FILM COATED ORAL
Qty: 90 TABLET | Refills: 3 | Status: SHIPPED | OUTPATIENT
Start: 2021-06-04 | End: 2021-08-27

## 2021-06-06 PROBLEM — E78.5 DYSLIPIDEMIA: Status: ACTIVE | Noted: 2021-06-06

## 2021-06-06 PROBLEM — S76.212A STRAIN OF LEFT GROIN: Status: ACTIVE | Noted: 2021-06-06

## 2021-06-06 NOTE — ASSESSMENT & PLAN NOTE
Colonoscopy March 2021: No evidence of diverticulosis, 20 mm sessile serrated adenoma  Patient denies symptoms of generalized abdominal pain per se, normal bowel movements and appetite   Strong family history of colon cancer      Patient will follow-up with [facility] for repeat colonoscopy in 6 months,  October 2021

## 2021-06-06 NOTE — ASSESSMENT & PLAN NOTE
Recent blood work reveals total cholesterol of 211 with LDL of 125      Patient will start following healthier  low-fat low-cholesterol diet   Reassess in 6 months

## 2021-06-06 NOTE — ASSESSMENT & PLAN NOTE
Successful surgery for left sports hernia 6-7 years ago  Recurrence of symptoms 4-6 weeks ago  Start anti-inflammatory for 2-3 weeks, then use as needed only      Referral to [facility]

## 2021-06-07 ENCOUNTER — OFFICE VISIT (OUTPATIENT)
Dept: PAIN MEDICINE | Facility: CLINIC | Age: 46
End: 2021-06-07
Payer: COMMERCIAL

## 2021-06-07 VITALS
HEART RATE: 86 BPM | WEIGHT: 101 LBS | BODY MASS INDEX: 19.24 KG/M2 | SYSTOLIC BLOOD PRESSURE: 110 MMHG | DIASTOLIC BLOOD PRESSURE: 72 MMHG

## 2021-06-07 DIAGNOSIS — M79.18 MYOFASCIAL PAIN SYNDROME: ICD-10-CM

## 2021-06-07 DIAGNOSIS — M50.120 CERVICAL DISC DISORDER WITH RADICULOPATHY OF MID-CERVICAL REGION: Primary | ICD-10-CM

## 2021-06-07 PROCEDURE — 1036F TOBACCO NON-USER: CPT | Performed by: ANESTHESIOLOGY

## 2021-06-07 PROCEDURE — 99214 OFFICE O/P EST MOD 30 MIN: CPT | Performed by: ANESTHESIOLOGY

## 2021-06-08 ENCOUNTER — HOSPITAL ENCOUNTER (OUTPATIENT)
Dept: ULTRASOUND IMAGING | Facility: HOSPITAL | Age: 46
Discharge: HOME | End: 2021-06-08
Payer: COMMERCIAL

## 2021-06-08 DIAGNOSIS — R10.2 PELVIC PAIN IN FEMALE: ICD-10-CM

## 2021-06-08 PROCEDURE — 76830 TRANSVAGINAL US NON-OB: CPT

## 2021-06-08 PROCEDURE — 76856 US EXAM PELVIC COMPLETE: CPT

## 2021-06-14 ENCOUNTER — APPOINTMENT (OUTPATIENT)
Dept: RADIOLOGY | Facility: MEDICAL CENTER | Age: 46
End: 2021-06-14
Payer: COMMERCIAL

## 2021-06-14 ENCOUNTER — OFFICE VISIT (OUTPATIENT)
Dept: OBGYN CLINIC | Facility: MEDICAL CENTER | Age: 46
End: 2021-06-14
Payer: COMMERCIAL

## 2021-06-14 VITALS
WEIGHT: 102 LBS | BODY MASS INDEX: 19.26 KG/M2 | TEMPERATURE: 97.8 F | DIASTOLIC BLOOD PRESSURE: 84 MMHG | SYSTOLIC BLOOD PRESSURE: 130 MMHG | HEART RATE: 76 BPM | HEIGHT: 61 IN

## 2021-06-14 DIAGNOSIS — M25.552 PAIN IN LEFT HIP: Primary | ICD-10-CM

## 2021-06-14 DIAGNOSIS — S76.212A STRAIN OF LEFT GROIN: ICD-10-CM

## 2021-06-14 DIAGNOSIS — M25.552 PAIN IN LEFT HIP: ICD-10-CM

## 2021-06-14 PROCEDURE — 73502 X-RAY EXAM HIP UNI 2-3 VIEWS: CPT

## 2021-06-14 PROCEDURE — 1036F TOBACCO NON-USER: CPT | Performed by: ORTHOPAEDIC SURGERY

## 2021-06-14 PROCEDURE — 3008F BODY MASS INDEX DOCD: CPT | Performed by: ORTHOPAEDIC SURGERY

## 2021-06-14 PROCEDURE — 99203 OFFICE O/P NEW LOW 30 MIN: CPT | Performed by: ORTHOPAEDIC SURGERY

## 2021-06-14 RX ORDER — DICLOFENAC SODIUM 75 MG/1
75 TABLET, DELAYED RELEASE ORAL 2 TIMES DAILY
Qty: 60 TABLET | Refills: 1 | Status: SHIPPED | OUTPATIENT
Start: 2021-06-14 | End: 2022-05-12

## 2021-06-14 NOTE — PROGRESS NOTES
Assessment/Plan     1  Pain in left hip    2  Strain of left groin      Orders Placed This Encounter   Procedures    XR hip/pelv 2-3 vws left if performed    Ambulatory referral to Physical Therapy     · Patient has left hip pain and possible labral tear  · Physical therapy order wad given out today for the left hip  Prescribed patient Diclofenac prn pain, medications warnings were reviewed with patient  Patient is aware not to take with other NSAIDs  · If pain persists consider ordering MRI arthrogram left hip at the next office visit  · May consider steroid injection in the future   Return in about 6 weeks (around 7/26/2021) for Recheck Left hip   I answered all of the patient's questions during the visit and provided education of the patient's condition during the visit  The patient verbalized understanding of the information given and agrees with the plan  This note was dictated using [employer] software  It may contain errors including improperly dictated words  Please contact physician directly for any questions  History of Present Illness   Chief complaint:   Chief Complaint   Patient presents with    Left Hip - Pain       HPI: Olimpia Cintron is a 39 y o  female that c/o left hip pain  She was referred by her PCP Dr Viki Baez  She states she has been having left groin pain for about a month and a half  She states she went to stand up from the toilet and felt a pop in her left groin and the  Pain would  come and go  She states now she is having constant tightness pinching pain in the left groin region  Denies radiating leg pain  She is taking Motrin as needed for pain Pain is worse with high flexion, sitting, and running  She states her PCP ordered a vaginal ultrasound for pelvic pain  She states she was diagnosed in 2013 with a sports hernia and a nerve release surgery  in 2013 by Dr Amilcar Cunningham in [place] with relief      She also treated with Dr Darlys Mortimer at [facility]   and had physical therapy for nine months and also had a pubic symphysis steroid injection  She has been doing her home exercises daily  She has no history having injections in the left hip  ROS:    See HPI for musculoskeletal review     All other systems reviewed are negative     Historical Information   Past Medical History:   Diagnosis Date    Cluster headache      Past Surgical History:   Procedure Laterality Date    ANKLE SURGERY Left 03/20    HERNIA REPAIR Left 09/2014    Sports Hernia Repair    WRIST SURGERY Right 03/2009    Dr Fidencio Coyne Right 02/07/2020     Social History   Social History     Substance and Sexual Activity   Alcohol Use Yes    Comment: Social      Social History     Substance and Sexual Activity   Drug Use No     Social History     Tobacco Use   Smoking Status Never Smoker   Smokeless Tobacco Never Used     Family History:   Family History   Problem Relation Age of Onset    Hypothyroidism Mother     Migraines Mother     Hyperlipidemia Father         Pure    Stomach cancer Paternal Grandmother 80    Ovarian cancer Maternal Grandmother 48        over age 48   Sudha Leo No Known Problems Sister     Lymphoma Maternal Grandfather 80    No Known Problems Paternal Grandfather     Prostate cancer Maternal Uncle     Prostate cancer Paternal Uncle        Current Outpatient Medications on File Prior to Visit   Medication Sig Dispense Refill    EPINEPHrine (EpiPen 2-Sukhdev) 0 3 mg/0 3 mL SOAJ Inject 0 3 mL (0 3 mg total) into a muscle as needed for anaphylaxis 2 each 1    famotidine (PEPCID) 40 MG tablet TAKE 1 TABLET BY MOUTH EVERY DAY 90 tablet 3    levothyroxine 75 mcg tablet TAKE 1 TABLET BY MOUTH EVERY DAY 90 tablet 3    LO LOESTRIN FE 1 MG-10 MCG / 10 MCG TABS Take 1 tablet by mouth daily        Loratadine (CLARITIN) 10 MG CAPS Take by mouth      multivitamin (THERAGRAN) TABS Take 1 tablet by mouth daily      nabumetone (RELAFEN) 500 mg tablet Take 1 tablet twice a day after food as needed for groin pain 60 tablet 1    SUMAtriptan (IMITREX) 25 mg tablet TAKE 1 TAB AT ONSET OF MIGRAINE, MAY REPEAT X 1 IN 2HRS IF NEEDED 9 tablet 3    SUMAtriptan (IMITREX) 50 mg tablet TAKE 1 TABLET AT THE ONSET OF A MIGRAINE, MAY REPEAT IN 2 HOURS IF NEEDED 9 tablet 3     No current facility-administered medications on file prior to visit  Allergies   Allergen Reactions    Codeine      Other reaction(s): Palpitations  Reaction Date: 03Aug2011;     Molds & Smuts        Objective   Vitals: Blood pressure 130/84, pulse 76, temperature 97 8 °F (36 6 °C), height 5' 0 75" (1 543 m), weight 46 3 kg (102 lb), not currently breastfeeding  ,Body mass index is 19 43 kg/m²  PE:  AAOx 3  WDWN  Hearing intact, no drainage from eyes  Regular rate  no audible wheezing  no abdominal distension  LE compartments soft, skin intact    left hip:   No dislocation/deformity  +  Stinchfield  Neg  SLR   ROM: Full   Mild pain with extreme of motion   Neg  Ese Test  +  Impingement test  No  TTP over greater trochanter  Abduction: 5/5/ no pain with resistant abduction   Neg  Adilia's test  Mild TTP over SIJ    bilateralLE:    LLE:  EHL/AT/GS/quads/hamstrings/iliopsoas 5/5, sensation grossly intact L4, L5, S1, palpable pedal pulse    RLE:  EHL/AT/GS/quads/hamstrings/iliopsoas 5/5, sensation grossly intact L4, L5, S1, palpable pedal pulse    Back:    No TTP over lumbar spinous processes, paraspinal musculature  SLR: Neg       Imaging Studies: I have personally reviewed pertinent films in PACS  righthip:  Pincer lesion       Scribe Attestation    I,:  Marilyn Amin am acting as a scribe while in the presence of the attending physician :       I,:  Lenard Thacker, DO personally performed the services described in this documentation    as scribed in my presence :

## 2021-06-17 ENCOUNTER — TELEPHONE (OUTPATIENT)
Dept: FAMILY MEDICINE CLINIC | Facility: CLINIC | Age: 46
End: 2021-06-17

## 2021-06-17 NOTE — TELEPHONE ENCOUNTER
----- Message from Claribel Regan MD sent at 6/16/2021  5:26 PM EDT -----  Please fax this ultrasound to patient's gyn, Khurram Oseguera   [facility]   Ph [phone]  Fax? ?  Please and thank you

## 2021-06-22 ENCOUNTER — EVALUATION (OUTPATIENT)
Dept: PHYSICAL THERAPY | Facility: CLINIC | Age: 46
End: 2021-06-22
Payer: COMMERCIAL

## 2021-06-22 DIAGNOSIS — M25.552 PAIN IN LEFT HIP: ICD-10-CM

## 2021-06-22 PROCEDURE — 97162 PT EVAL MOD COMPLEX 30 MIN: CPT | Performed by: PHYSICAL THERAPIST

## 2021-06-22 PROCEDURE — 97530 THERAPEUTIC ACTIVITIES: CPT | Performed by: PHYSICAL THERAPIST

## 2021-06-22 NOTE — PROGRESS NOTES
PT Evaluation     Today's date: 2021  Patient name: Karen Griggs  : 1975  MRN: [id number]  Referring provider: Conrado Fry DO  Dx:   Encounter Diagnosis     ICD-10-CM    1  Pain in left hip  M25 552 Ambulatory referral to Physical Therapy                  Assessment  Assessment details: Karen Griggs is a 39 y o  female presents with signs and symptoms consistent with:   Pain in left hip  Hip flexor tone  Shola Diego has the above listed impairments and will benefit from skilled PT to address deficits to return to prior level of function  Etiologic factors include prior hip surgery  Prognosis is good given HEP compliance and attendance to physical therapy 2x a week  Positive prognostic indicators include motivation, family support, active lifestyle  Negative prognostic indicators include prior hip surgery  Please contact me if you have any questions or recommendations  Thank you for the referral and the opportunity to share in Shola Diego ERNESTO Nettles's care  Patient verbalized understanding of POC, HEP, and return demonstrated HEP  Impairments: abnormal coordination, abnormal gait, abnormal muscle firing, abnormal muscle tone, abnormal or restricted ROM, abnormal movement, activity intolerance, impaired balance, impaired physical strength, lacks appropriate home exercise program, pain with function and weight-bearing intolerance  Barriers to therapy: [id number] Societal implications    Understanding of Dx/Px/POC: good   Prognosis: good    Goals  Impairment Goals 4-6 weeks  - Decrease pain to <3/10  - Improve hip AROM to equal to the unaffected lower extremity  - Increase knee strength to 5/5 throughout  - Increase hip strength to 4/5 throughout    Functional Goals 6-8 weeks  - Return to Prior Level of Function  - Increase Functional Status Measure (FOTO) to: 76  - Patient will be independent with HEP  - Patient will be able to sit without increased pain/compensation/difficulty  - Patient will be able to walk without increased pain/compensation/difficulty   - Patient will be able to bike without increased pain/compensation/difficulty   - Patient will be able to participate in intercourse with her  with minimal pain       Plan  Patient would benefit from: skilled PT  Referral necessary: No  Planned modality interventions: cryotherapy, electrical stimulation/Russian stimulation, H-Wave, TENS, thermotherapy: hydrocollator packs and unattended electrical stimulation  Planned therapy interventions: abdominal trunk stabilization, activity modification, ADL retraining, balance/weight bearing training, breathing training, body mechanics training, coordination, flexibility, functional ROM exercises, graded exercise, home exercise program, work reintegration, therapeutic training, therapeutic exercise, therapeutic activities, stretching, strengthening, self care, postural training, patient education, neuromuscular re-education, muscle pump exercises, motor coordination training, Jarrett taping, manual therapy, joint mobilization, IADL retraining, balance and gait training  Frequency: 2x week  Duration in visits: 16  Duration in weeks: 8  Treatment plan discussed with: patient, PTA and referring physician        Subjective Evaluation    Pain  Current pain rating: 3  At best pain ratin  At worst pain ratin  Location: left groin pain    Patient Goals  Patient goal: Patient reports that she wants to feel better, to strengthen the hip, and not have pain with intimacy  WORK:  Patient reports that she is a  and   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  She is able to work from home  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[place], in their studio  She does have a standing desk, but is sitting for the majority of her day        HOME LIFE: Patient reports that she lives with her   Patient reports that she is responsible for cleaning, cooking, some yard work  HOBBIES/EXERCISE: She runs 3-4 days a week (30-40 mins, 3-4 miles),  ON the weekends will run in the woods for 3-5 miles     Stand up paddle boards, rides her bike  Skiing, ice skate, hiking  Yoga  PAIN LOCATION/DESCRIPTORS:  Patient reports that she has pain in the left groin  Feels like her underwear is too tight, pinching feeling, will radiate above and below  Feels like her hip is stuck  Up in the joint socket  When the hip hurts, her ankle will start to feel really tight on the inside of her ankle  Patient notes that sometimes it will wrap under her heel  Intermittent, varies, deep  AGGRAVATING FACTORS:  Sitting for prolonged periods of time  Sleeping, being still  Running will start out stiff, but will loosen up  Biking hurts the whole time  Stepping up  She notes that she will have pain with intimacy with her   Being on her hands and knees is more painful  She notes that if her bowels are more full, or she is gassy, her pain will increase  EASES: stretching  Started doing the old PT exercises she was given the last time around  Movement, tylenol- sometimes  CBD seems to help- she takes orally and will rub it on  Sometimes stretching will not help and sometimes she doesn't even remember that she has an issue  LATENCY: a few mins  At night she will stretch and roll for 45 mins and will be able to lay down and go to sleep  DAY PATTERN:  Activity dependent  Will be more painful in the evening and morning  IMAGING:  US: normal   X-ray: normal, slightly decreased joint space  PLOF:  She reports that she has had pain for 2 months, and had a nerve entrapment in the past   Has been bothering her off and on for 1 year  HISTORY OF CURRENT INJURY:  Patient reports that her hip has been bothering her off and on for about 1 year    She reports that the last 2 months, its really started to kick up  She notes that the hip is really starting to affect her daily life  She notes that the hip will pop occasionally  She does not have more pain with the popping  Objective     Tenderness     Additional Tenderness Details  Iliacus and psoas 4/4 TTP    Active Range of Motion     Lumbar   Normal active range of motion    Passive Range of Motion   Left Hip   Flexion: 115 degrees with pain  Extension: Left hip passive extension: lacking 20*   Abduction: 40 degrees   External rotation (90/90): 60 degrees   Internal rotation (90/90): 60 degrees     Right Hip   Flexion: 120 degrees   Extension: 0 degrees   Abduction: 40 degrees   External rotation (90/90): 60 degrees   Internal rotation (90/90): 40 degrees     Strength/Myotome Testing     Left Hip   Planes of Motion   Flexion: 4-  Extension: 3  Abduction: 4-    Right Hip   Planes of Motion   Flexion: 4-  Extension: 4-  Abduction: 4-    Tests     Left Hip   Positive JOMAR and FADIR  Negative scour  General Comments:      Hip Comments   Increased pain with bending over: pinch in the left hip  Deep squat: no pain  Diagnosis: hip flexor strain left   Precautions: n/a   Goals: improve hip flexibility, improve hip strength  Manual Therapy 6/22/21                                               Exercise Diary         Therapeutic Exercise        Hip flexor stretch with PPT [id number]                                                       Neuromuscular Re-education        Standing glut set [id number]       Alt glut set        bridges        Clams/elevated clams        Lawerance Muck board balance                        Therapeutic Activities        Pt education HEP reviewed  All questions/concerns addressed  POC                   Modalities        CP PRN

## 2021-06-24 ENCOUNTER — OFFICE VISIT (OUTPATIENT)
Dept: PHYSICAL THERAPY | Facility: CLINIC | Age: 46
End: 2021-06-24
Payer: COMMERCIAL

## 2021-06-24 DIAGNOSIS — M25.552 PAIN IN LEFT HIP: Primary | ICD-10-CM

## 2021-06-24 PROCEDURE — 97110 THERAPEUTIC EXERCISES: CPT

## 2021-06-24 PROCEDURE — 97112 NEUROMUSCULAR REEDUCATION: CPT

## 2021-06-24 PROCEDURE — 97140 MANUAL THERAPY 1/> REGIONS: CPT

## 2021-06-24 NOTE — PROGRESS NOTES
Daily Note     Today's date: 2021  Patient name: Vinh Cain  : 1975  MRN: [id number]  Referring provider: Daniel Saldaña DO  Dx:   Encounter Diagnosis     ICD-10-CM    1  Pain in left hip  M25 552                   Subjective: Pt states that yesterday her hip felt a little more "pinchy" but she was sitting more so expects that  Pt reports today it feels a little better  Objective: See treatment diary below      Assessment: Tolerated treatment well  Initiated hip strengthening ex's along with stretching and manual therapy to address pt's pain and strength deficits  Pt able to perform all ex's, with only complaint with SLB noting slight "tightness/pull"  No other complaints  Pt challenged with isolating gluts but is able with practice and concentration  Tenderness is noted with manual therapy  Reviewed HEP with pt reporting understanding  She will benefit from continued therapy  Plan: Continue per plan of care     Diagnosis: hip flexor strain left   Precautions: n/a   Goals: improve hip flexibility, improve hip strength  Manual Therapy 21      Hip flexor TPR  TJH, PTA      Hip flexor PIR  TJH, PTA                              Exercise Diary         Therapeutic Exercise        Hip flexor stretch with PPT [id number] 3x30 sec ea                                                      Neuromuscular Re-education        Standing glut set [id number] 10x10 sec      Alt glut set  10x10 sec      bridges  20x SB     Clams/elevated clams  20x ea  clams      X-walks  NV      Wobble board balance  2 mins ea      SLB  2x30 sec ea      Tandem balance        Therapeutic Activities        Pt education HEP reviewed  All questions/concerns addressed  POC     HEP reviewed              Modalities        CP PRN

## 2021-06-28 ENCOUNTER — OFFICE VISIT (OUTPATIENT)
Dept: PHYSICAL THERAPY | Facility: CLINIC | Age: 46
End: 2021-06-28
Payer: COMMERCIAL

## 2021-06-28 DIAGNOSIS — M25.552 PAIN IN LEFT HIP: Primary | ICD-10-CM

## 2021-06-28 PROCEDURE — 97140 MANUAL THERAPY 1/> REGIONS: CPT

## 2021-06-28 PROCEDURE — 97112 NEUROMUSCULAR REEDUCATION: CPT

## 2021-06-28 PROCEDURE — 97530 THERAPEUTIC ACTIVITIES: CPT

## 2021-06-28 NOTE — PROGRESS NOTES
Daily Note     Today's date: 2021  Patient name: Reny Nation  : 1975  MRN: [id number]  Referring provider: Fior Hale DO  Dx:   Encounter Diagnosis     ICD-10-CM    1  Pain in left hip  M25 552                   Subjective: Pt states that her hip was a little tender lv after the manual therapy, but she iced it at home and that helped  She reports being a little sore otherwise but nothing bad  Pt states that running did still help to loosen it up  Objective: See treatment diary below      Assessment: Tolerated treatment well  Progressed pt with strengthening ex's as tolerated with pt having no c/o increased pain sx's  Pt challenged with progressions and muscle fatigue is noted  Tenderness is noted in hip flexor near origin, with pt reporting less tenderness than lv  Pt will benefit from continued therapy  Plan: Continue per plan of care     Diagnosis: hip flexor strain left   Precautions: n/a   Goals: improve hip flexibility, improve hip strength  Manual Therapy 21     Hip flexor TPR  TJH, PTA TJH, PTA     Hip flexor PIR  TJH, PTA                              Exercise Diary         Therapeutic Exercise        Hip flexor stretch with PPT [id number] 3x30 sec ea NV                                                     Neuromuscular Re-education        Standing glut set [id number] 10x10 sec 10x10 sec     Alt glut set  10x10 sec 10x10 sec     bridges  20x SB: 15x     Clams/elevated clams  20x ea  clams Elevated: 15x     X-walks  NV RTB 2x     Wobble board balance  2 mins ea 2 mins ea     SLB  2x30 sec ea 3x30 sec ea     Tandem balance   3x30 sec ea     Therapeutic Activities        Pt education HEP reviewed  All questions/concerns addressed  POC     HEP reviewed HEP discussed             Modalities        CP PRN

## 2021-07-01 ENCOUNTER — OFFICE VISIT (OUTPATIENT)
Dept: PHYSICAL THERAPY | Facility: CLINIC | Age: 46
End: 2021-07-01
Payer: COMMERCIAL

## 2021-07-01 DIAGNOSIS — M25.552 PAIN IN LEFT HIP: Primary | ICD-10-CM

## 2021-07-01 PROCEDURE — 97530 THERAPEUTIC ACTIVITIES: CPT | Performed by: PHYSICAL THERAPIST

## 2021-07-01 PROCEDURE — 97112 NEUROMUSCULAR REEDUCATION: CPT | Performed by: PHYSICAL THERAPIST

## 2021-07-01 PROCEDURE — 97140 MANUAL THERAPY 1/> REGIONS: CPT | Performed by: PHYSICAL THERAPIST

## 2021-07-01 NOTE — PROGRESS NOTES
Daily Note     Today's date: 2021  Patient name: Thai Leos  : 1975  MRN: [id number]  Referring provider: Brian Singh DO  Dx:   Encounter Diagnosis     ICD-10-CM    1  Pain in left hip  M25 552                   Subjective: Patient reports that she has increased pain today in the lower abdominal/hip area  She reports 2-3/10 pain  She reports that she will feel it when walking  She did a lot of yard work on Tuesday, but mostly sitting/sleeping on left side  Objective: See treatment diary below      Assessment: Tolerated treatment well  Patient exhibited good technique with therapeutic exercises  Patient was able to perform all exercises today without increased pain  She had no pain after manual therapy at the end of the session  She reports only soreness  She is improving well toward long term goals  Plan: Continue per plan of care  Diagnosis: hip flexor strain left   Precautions: n/a   Goals: improve hip flexibility, improve hip strength  Manual Therapy 21    Hip flexor TPR  TJH, PTA TJH, PTA HJS, PT    Hip flexor PIR  TJH, PTA  HJS,PT                            Exercise Diary         Therapeutic Exercise        Hip flexor stretch with PPT [id number] 3x30 sec ea NV [id number] ea    Hip add stretch                                                Neuromuscular Re-education        Standing glut set [id number] 10x10 sec 10x10 sec [id number] Ladders: Alt glut set  10x10 sec 10x10 sec [id number] ea    bridges  20x SB: 15x SB bridges, knees bent/straight:     Clams/elevated clams  20x ea  clams Elevated: 15x 20x     X-walks  NV RTB 2x RTB 2 laps    Wobble board balance  2 mins ea 2 mins ea 2 mins ea    SLB  2x30 sec ea 3x30 sec ea On airex: 1x07wlb ea    Tandem balance   3x30 sec ea Tandem walking on airex, F/B: 2 laps    Heel clicks    NV    Quadruped kick backs    NV    Hip hinge                Therapeutic Activities        Pt education HEP reviewed  All questions/concerns addressed  POC  HEP reviewed HEP discussed HEP  All questions/concerns addressed  Educate on sitting glut sets for reciprocal inhibition    Education on reciprocal inhibition           Modalities        CP PRN

## 2021-07-06 ENCOUNTER — OFFICE VISIT (OUTPATIENT)
Dept: PHYSICAL THERAPY | Facility: CLINIC | Age: 46
End: 2021-07-06
Payer: COMMERCIAL

## 2021-07-06 DIAGNOSIS — G43.009 MIGRAINE WITHOUT AURA AND WITHOUT STATUS MIGRAINOSUS, NOT INTRACTABLE: ICD-10-CM

## 2021-07-06 DIAGNOSIS — M25.552 PAIN IN LEFT HIP: Primary | ICD-10-CM

## 2021-07-06 PROCEDURE — 97112 NEUROMUSCULAR REEDUCATION: CPT

## 2021-07-06 PROCEDURE — 97110 THERAPEUTIC EXERCISES: CPT

## 2021-07-06 PROCEDURE — 97140 MANUAL THERAPY 1/> REGIONS: CPT

## 2021-07-06 RX ORDER — SUMATRIPTAN 25 MG/1
TABLET, FILM COATED ORAL
Qty: 9 TABLET | Refills: 3 | Status: SHIPPED | OUTPATIENT
Start: 2021-07-06 | End: 2021-12-13

## 2021-07-06 NOTE — PROGRESS NOTES
Daily Note     Today's date: 2021  Patient name: Shane Alarcon  : 1975  MRN: [id number]  Referring provider: Ani Manley DO  Dx:   Encounter Diagnosis     ICD-10-CM    1  Pain in left hip  M25 552                   Subjective: Pt states that she is a little sore this morning but she knows that she did a lot of activity over the weekend that may have irritated it some  She reports that she pushes into "that spot" at night before she goes to sleep and that seems to help her sleep a little better  Objective: See treatment diary below      Assessment: Tolerated treatment well  Pt able to perform progressions in strengthening with only minimal discomfort noted during heel clicks  Pt reports slight "pull" but no lasting pain  Pt challenged with progressions and muscle fatigue noted  Tenderness noted during manual therapy with decreased pain after  Pt progressing slowly towards her goals  Plan: Continue per plan of care     Diagnosis: hip flexor strain left   Precautions: n/a   Goals: improve hip flexibility, improve hip strength       Manual Therapy  21   Hip flexor TPR  TJH, PTA TJH, PTA HJS, PT TJH, PTA   Hip flexor PIR  TJH, PTA  HJS,PT TJH, PTA                           Exercise Diary         Therapeutic Exercise        Hip flexor stretch with PPT  3x30 sec ea NV [id number] ea 3x30 sec ea   Hip add stretch                                                Neuromuscular Re-education        Standing glut set  10x10 sec 10x10 sec [id number] Ladders: 10x   Alt glut set  10x10 sec 10x10 sec [id number] ea 10x10 sec ea   bridges  20x SB: 15x SB bridges, knees bent/straight:  SB bent/straight:  15x ea   Clams/elevated clams  20x ea  clams Elevated: 15x 20x  Elevated: 20x   X-walks  NV RTB 2x RTB 2 laps RTB 2 laps   Wobble board balance  2 mins ea 2 mins ea 2 mins ea 2 mins ea   SLB  2x30 sec ea 3x30 sec ea On airex: 5e22mxs ea On airex:  3x30 sec ea   Tandem balance   3x30 sec ea Tandem walking on airex, F/B: 2 laps Tandem walking on airex; F/B  2 laps   Heel clicks    NV 33X7 sec   Quadruped kick backs    NV 10x ea   Hip hinge     NV           Therapeutic Activities        Pt education   Educate on sitting glut sets for reciprocal inhibition  Education on reciprocal inhibition    HEP reviewed HEP discussed HEP  All questions/concerns addressed                Modalities        CP PRN

## 2021-07-08 ENCOUNTER — OFFICE VISIT (OUTPATIENT)
Dept: PHYSICAL THERAPY | Facility: CLINIC | Age: 46
End: 2021-07-08
Payer: COMMERCIAL

## 2021-07-08 DIAGNOSIS — M25.552 PAIN IN LEFT HIP: Primary | ICD-10-CM

## 2021-07-08 PROCEDURE — 97530 THERAPEUTIC ACTIVITIES: CPT

## 2021-07-08 PROCEDURE — 97110 THERAPEUTIC EXERCISES: CPT

## 2021-07-08 PROCEDURE — 97112 NEUROMUSCULAR REEDUCATION: CPT

## 2021-07-08 PROCEDURE — 97140 MANUAL THERAPY 1/> REGIONS: CPT

## 2021-07-08 NOTE — PROGRESS NOTES
Daily Note     Today's date: 2021  Patient name: Serena Washington  : 1975  MRN: [id number]  Referring provider: Mirela Harris DO  Dx:   Encounter Diagnosis     ICD-10-CM    1  Pain in left hip  M25 552                   Subjective: Pt states that yesterday and today she is not having quite as much pain, she can't really figure out a trigger that makes it worse some days except sitting  Objective: See treatment diary below      Assessment: Tolerated treatment well  Progressed pt with hip strengthening ex's with no c/o increased pain  Performed glut sets in seated position this visit and educated pt on reciprocal inhibition  Pt reports understanding and will add to her HEP  Pt notes less tenderness in her hip flexor since lv, with decreased pain after  Pt progressing slowly towards her goals  Plan: Continue per plan of care     Diagnosis: hip flexor strain left   Precautions: n/a   Goals: improve hip flexibility, improve hip strength       Manual Therapy 21   Hip flexor TPR TJH, PTA  TJH, PTA HJS, PT TJH, PTA   Hip flexor PIR    HJS,PT TJH, PTA                           Exercise Diary         Therapeutic Exercise        Hip flexor stretch with PPT 3x30 sec ea  NV [id number] ea 3x30 sec ea   Hip add stretch                                                Neuromuscular Re-education        Seated glut set 10x10 sec  10x10 sec [id number] Ladders: 10x   Alt glut set seated 10x10 sec  10x10 sec [id number] ea 10x10 sec ea   SB bridges  Straight/bent 15x ea  SB: 15x SB bridges, knees bent/straight:  SB bent/straight:  15x ea   elevated clams   Elevated: 15x 20x  Elevated: 20x   X-walks   RTB 2x RTB 2 laps RTB 2 laps   Wobble board balance 2 mins ea  2 mins ea 2 mins ea 2 mins ea   SLB on airex 3x30 sec ea  3x30 sec ea On airex: 6w65hos ea On airex:  3x30 sec ea   Tandem balance on airex 2x30 sec ea  3x30 sec ea Tandem walking on airex, F/B: 2 laps Tandem walking on airex; F/B  2 laps   Tandem walking on airex NV       Heel clicks 89V9 sec   NV 84B2 sec   Quadruped kick backs 10x ea   NV 10x ea   Hip hinge 10x    NV   Heel taps 6" 2x10 ea       Therapeutic Activities        Pt education   Educate on sitting glut sets for reciprocal inhibition  Education on reciprocal inhibition     HEP discussed HEP  All questions/concerns addressed                Modalities        CP PRN

## 2021-07-09 DIAGNOSIS — G43.009 MIGRAINE WITHOUT AURA AND WITHOUT STATUS MIGRAINOSUS, NOT INTRACTABLE: ICD-10-CM

## 2021-07-09 RX ORDER — SUMATRIPTAN 50 MG/1
TABLET, FILM COATED ORAL
Qty: 9 TABLET | Refills: 3 | Status: SHIPPED | OUTPATIENT
Start: 2021-07-09

## 2021-07-12 ENCOUNTER — OFFICE VISIT (OUTPATIENT)
Dept: PHYSICAL THERAPY | Facility: CLINIC | Age: 46
End: 2021-07-12
Payer: COMMERCIAL

## 2021-07-12 ENCOUNTER — TELEPHONE (OUTPATIENT)
Dept: PAIN MEDICINE | Facility: CLINIC | Age: 46
End: 2021-07-12

## 2021-07-12 DIAGNOSIS — M25.552 PAIN IN LEFT HIP: Primary | ICD-10-CM

## 2021-07-12 PROCEDURE — 97112 NEUROMUSCULAR REEDUCATION: CPT

## 2021-07-12 PROCEDURE — 97140 MANUAL THERAPY 1/> REGIONS: CPT

## 2021-07-12 PROCEDURE — 97110 THERAPEUTIC EXERCISES: CPT

## 2021-07-12 NOTE — PROGRESS NOTES
Daily Note     Today's date: 2021  Patient name: Saúl José  : 1975  MRN: [id number]  Referring provider: Juan Aguial DO  Dx:   Encounter Diagnosis     ICD-10-CM    1  Pain in left hip  M25 552                   Subjective: Pt states that she was sore yesterday, she did go paddle boarding the day before but it felt fine after  Objective: See treatment diary below      Assessment: Tolerated treatment well  Pt able to perform progressions in strengthening with no c/o increased hip pain  Pt challenged and muscle fatigue is noted  Hip flexor length slowly improving with hip flexor PIR  Tenderness is noted during TPR  Will continue to progress nv as able  Plan: Continue per plan of care     Diagnosis: hip flexor strain left   Precautions: n/a   Goals: improve hip flexibility, improve hip strength       Manual Therapy 21   Hip flexor TPR TJH, PTA TJH, PTA  HJS, PT TJH, PTA   Hip flexor PIR  TJH, PTA  HJS,PT TJH, PTA                           Exercise Diary         Therapeutic Exercise        Hip flexor stretch with PPT 3x30 sec ea 3x30 sec ea  [id number] ea 3x30 sec ea   Hip add stretch                                                Neuromuscular Re-education        Seated glut set 10x10 sec 10x10 sec  [id number] Ladders: 10x   Alt glut set seated 10x10 sec 10x10 sec  [id number] ea 10x10 sec ea   SB bridges  Straight/bent 15x ea Triple threats  10x  SB bridges, knees bent/straight:  SB bent/straight:  15x ea   elevated clams    20x  Elevated: 20x   X-walks  GTB 2x  RTB 2 laps RTB 2 laps   Wobble board balance 2 mins ea 2 mins ea  2 mins ea 2 mins ea   SLB on airex 3x30 sec ea 3x30 sec ea  On airex: [id number] ea On airex:  3x30 sec ea   Tandem balance on airex 2x30 sec ea 2x30 sec ea  Tandem walking on airex, F/B: 2 laps Tandem walking on airex; F/B  2 laps   Tandem walking on airex NV 2x      Heel clicks 20K0 sec 01Y5 sec  NV 10x5 sec   Quadruped kick backs 10x ea   NV 10x ea   Hip hinge 10x 10x ea   NV   Heel taps 6" 2x10 ea 6" 2x10 ea      Therapeutic Activities        Pt education   Educate on sitting glut sets for reciprocal inhibition  Education on reciprocal inhibition      HEP  All questions/concerns addressed                Modalities        CP PRN

## 2021-07-13 ENCOUNTER — OFFICE VISIT (OUTPATIENT)
Dept: PHYSICAL THERAPY | Facility: CLINIC | Age: 46
End: 2021-07-13
Payer: COMMERCIAL

## 2021-07-13 DIAGNOSIS — M25.552 PAIN IN LEFT HIP: Primary | ICD-10-CM

## 2021-07-13 PROCEDURE — 97112 NEUROMUSCULAR REEDUCATION: CPT

## 2021-07-13 PROCEDURE — 97110 THERAPEUTIC EXERCISES: CPT

## 2021-07-13 PROCEDURE — 97140 MANUAL THERAPY 1/> REGIONS: CPT

## 2021-07-13 PROCEDURE — 97530 THERAPEUTIC ACTIVITIES: CPT

## 2021-07-13 NOTE — PROGRESS NOTES
Daily Note     Today's date: 2021  Patient name: Shane Alarcon  : 1975  MRN: [id number]  Referring provider: Ani Manley DO  Dx:   Encounter Diagnosis     ICD-10-CM    1  Pain in left hip  M25 552                   Subjective: Pt reports feeling sore with some throbbing last night but she thinks that she sat too long and that made it worse  Objective: See treatment diary below      Assessment: Tolerated treatment well  Progressed pt with hip  Strengthening with muscle fatigue noted  Cues to correct knee valgus needed with cup taps  Pt able to correct with cues but challenged with control  Tenderness noted at origin during manual therapy  Reviewed HEP with pt reporting compliance and understanding  Pt progressing slowly towards her goals  Plan: Continue per plan of care     Diagnosis: hip flexor strain left   Precautions: n/a   Goals: improve hip flexibility, improve hip strength       Manual Therapy 21   Hip flexor TPR TJH, PTA TJH, PTA TJH, PTA  TJH, PTA   Hip flexor PIR  TJH, PTA   TJH, PTA                           Exercise Diary         Therapeutic Exercise        Hip flexor stretch with PPT 3x30 sec ea 3x30 sec ea 3x30 sec ea  3x30 sec ea   Hip add stretch                                                Neuromuscular Re-education        Seated glut set 10x10 sec 10x10 sec   Ladders: 10x   Alt glut set seated 10x10 sec 10x10 sec 10x10 sec  10x10 sec ea   Standing glut ladders   10x     SB bridges  Straight/bent 15x ea Triple threats  10x   SB bent/straight:  15x ea   elevated clams     Elevated: 20x   X-walks  GTB 2x   RTB 2 laps   Wobble board balance 2 mins ea 2 mins ea 2 mins ea  2 mins ea   SLB on airex 3x30 sec ea 3x30 sec ea   On airex:  3x30 sec ea   Tandem balance on airex 2x30 sec ea 2x30 sec ea 3x30 sec ea  Tandem walking on airex; F/B  2 laps   Tandem walking on airex NV 2x 2x     Heel clicks 55Z5 sec 00O2 sec   10x5 sec   Quadruped kick backs 10x ea  10xea  10x ea   Stool scoots   2x     Cup taps   5x on 12" box cues for knee valgus     Running man   10x ea     Hip hinge 10x 10x ea   NV   Heel taps 6" 2x10 ea 6" 2x10 ea      Therapeutic Activities        Pt education   Educate on sitting glut sets for reciprocal inhibition    Education on reciprocal inhibition     Reviewed HEP             Modalities        CP PRN

## 2021-07-13 NOTE — TELEPHONE ENCOUNTER
Please let patient know that MRI C-spine report was normal   Please have her drop off CD of images for me to review

## 2021-07-15 ENCOUNTER — APPOINTMENT (OUTPATIENT)
Dept: PHYSICAL THERAPY | Facility: CLINIC | Age: 46
End: 2021-07-15
Payer: COMMERCIAL

## 2021-07-19 ENCOUNTER — OFFICE VISIT (OUTPATIENT)
Dept: PHYSICAL THERAPY | Facility: CLINIC | Age: 46
End: 2021-07-19
Payer: COMMERCIAL

## 2021-07-19 DIAGNOSIS — M25.552 PAIN IN LEFT HIP: Primary | ICD-10-CM

## 2021-07-19 PROCEDURE — 97112 NEUROMUSCULAR REEDUCATION: CPT | Performed by: PHYSICAL THERAPIST

## 2021-07-19 PROCEDURE — 97110 THERAPEUTIC EXERCISES: CPT | Performed by: PHYSICAL THERAPIST

## 2021-07-19 PROCEDURE — 97140 MANUAL THERAPY 1/> REGIONS: CPT | Performed by: PHYSICAL THERAPIST

## 2021-07-19 NOTE — PROGRESS NOTES
Daily Note     Today's date: 2021  Patient name: Joselyn Guerra  : 1975  MRN: [id number]  Referring provider: Anali Arthur DO  Dx:   Encounter Diagnosis     ICD-10-CM    1  Pain in left hip  M25 552        Start Time: 0700  Stop Time: 745  Total time in clinic (min): 45 minutes    Subjective: Pt reports that her hip is a little sore today since she had a long car ride yesterday and it always bothers her when she has to sit for long periods  Objective: See treatment diary below      Assessment: Pt tolerated treatment well overall and did not have any increase in baseline symptom irritability  She did well with balance exercises and we were able to progress SLS and tandem stance to the rebounder, as well as adding in a running man component to a step up to unstable surface  She had tenderness in hip flexor which did subside with manual release  I showed her a modified jeannette stretch that she can add to her stretching routine  Plan: Continue per plan of care  Progress treatment as tolerated  Diagnosis: hip flexor strain left   Precautions: n/a   Goals: improve hip flexibility, improve hip strength       Manual Therapy 21   Hip flexor TPR TJH, PTA TJH, PTA TJH, PTA LCB, PT TJH, PTA   Hip flexor PIR  TJH, PTA   TJH, PTA                           Exercise Diary         Therapeutic Exercise        Hip flexor stretch with PPT 3x30 sec ea 3x30 sec ea 3x30 sec ea 3x30 sec ea 3x30 sec ea   Hip add stretch        Modified jeannette stretch    3x30 sec ea                                    Neuromuscular Re-education        Seated glut set 10x10 sec 10x10 sec   Ladders: 10x   Alt glut set seated 10x10 sec 10x10 sec 10x10 sec  10x10 sec ea   Standing glut ladders   10x     SB bridges  Straight/bent 15x ea Triple threats  10x   SB bent/straight:  15x ea   elevated clams     Elevated: 20x   X-walks  GTB 2x   RTB 2 laps   Wobble board balance 2 mins ea 2 mins ea 2 mins ea 2 mins ea 2 mins ea   SLB on airex 3x30 sec ea 3x30 sec ea   On airex:  3x30 sec ea   Tandem balance on airex 2x30 sec ea 2x30 sec ea 3x30 sec ea  Tandem walking on airex; F/B  2 laps   Tandem walking on airex NV 2x 2x 2x    Rebounder    Air-ex: fwd SLS 20x red; lat tandem 83J red    Heel clicks 30D5 sec 76V9 sec   10x5 sec   Quadruped kick backs 10x ea  10xea  10x ea   Stool scoots   2x     Cup taps   5x on 12" box cues for knee valgus 7x on 12" box cues for knee valgus    Running man   10x ea With step up: lateral step up to 6" box; fwd step up added air-ex    Hip hinge 10x 10x ea   NV   Heel taps 6" 2x10 ea 6" 2x10 ea      Therapeutic Activities        Pt education   Educate on sitting glut sets for reciprocal inhibition    Education on reciprocal inhibition     Reviewed HEP             Modalities        CP PRN

## 2021-07-20 NOTE — TELEPHONE ENCOUNTER
Pt called in stating that she'll bring in the CD tomorrow to the office  She's wanting the results of her MRI   Thank you     cb [phone]

## 2021-07-22 ENCOUNTER — OFFICE VISIT (OUTPATIENT)
Dept: PHYSICAL THERAPY | Facility: CLINIC | Age: 46
End: 2021-07-22
Payer: COMMERCIAL

## 2021-07-22 DIAGNOSIS — M25.552 PAIN IN LEFT HIP: Primary | ICD-10-CM

## 2021-07-22 PROCEDURE — 97140 MANUAL THERAPY 1/> REGIONS: CPT

## 2021-07-22 PROCEDURE — 97110 THERAPEUTIC EXERCISES: CPT

## 2021-07-22 PROCEDURE — 97112 NEUROMUSCULAR REEDUCATION: CPT

## 2021-07-22 NOTE — PROGRESS NOTES
Daily Note     Today's date: 2021  Patient name: Yojana Sullivan  : 1975  MRN: [id number]  Referring provider: Lul Kenyon DO  Dx:   Encounter Diagnosis     ICD-10-CM    1  Pain in left hip  M25 552                   Subjective: Pt states she is feeling a bit tighter in her left hip today  Objective: See treatment diary below      Assessment: Pt progressed through exercises well today with no increase in pain and min to moderate fatigue  Pt required VC with modified jeannette stretch to improve form to maximize hip flexor stretch  Pt was able to progress wobble board today to [facility] as she is improving with balance nad over all stability  Pt would continue to benefit from PT  Plan: Continue per plan of care  Progress treatment as tolerated  Diagnosis: hip flexor strain left   Precautions: n/a   Goals: improve hip flexibility, improve hip strength       Manual Therapy 21    Hip flexor TPR TJH, PTA TJH, PTA TJH, PTA LCB, PT CF   Hip flexor PIR  TJH, PTA                              Exercise Diary         Therapeutic Exercise        Hip flexor stretch with PPT 3x30 sec ea 3x30 sec ea 3x30 sec ea 3x30 sec ea 3 x 30" ea    Hip add stretch        Modified jeannette stretch    3x30 sec ea 3  X30" ea                                   Neuromuscular Re-education        Seated glut set 10x10 sec 10x10 sec      Alt glut set seated 10x10 sec 10x10 sec 10x10 sec     Standing glut ladders   10x     SB bridges  Straight/bent 15x ea Triple threats  10x   10x    elevated clams        X-walks  GTB 2x      Wobble board balance 2 mins ea 2 mins ea 2 mins ea 2 mins ea 2 mins ea EC    SLB on airex 3x30 sec ea 3x30 sec ea      Tandem balance on airex 2x30 sec ea 2x30 sec ea 3x30 sec ea     Tandem walking on airex NV 2x 2x 2x 2x    Rebounder    Air-ex: fwd SLS 20x red; lat tandem 20x red Air-ex: fwd SLS 20x red; lat tandem 29I red   Heel clicks 71F3 sec 83E0 sec      Quadruped kick backs 10x ea  10xea     Stool scoots   2x     Cup taps   5x on 12" box cues for knee valgus 7x on 12" box cues for knee valgus 7x on 12" box cues for knee valgus   Running man   10x ea With step up: lateral step up to 6" box; fwd step up added air-ex With step up: lateral step up to 6" box; fwd step up added air-ex   Hip hinge 10x 10x ea      Heel taps 6" 2x10 ea 6" 2x10 ea      Therapeutic Activities        Pt education   Educate on sitting glut sets for reciprocal inhibition    Education on reciprocal inhibition     Reviewed HEP             Modalities        CP PRN

## 2021-07-26 ENCOUNTER — EVALUATION (OUTPATIENT)
Dept: PHYSICAL THERAPY | Facility: CLINIC | Age: 46
End: 2021-07-26
Payer: COMMERCIAL

## 2021-07-26 DIAGNOSIS — M25.552 PAIN IN LEFT HIP: Primary | ICD-10-CM

## 2021-07-26 PROCEDURE — 97112 NEUROMUSCULAR REEDUCATION: CPT | Performed by: PHYSICAL THERAPIST

## 2021-07-26 PROCEDURE — 97140 MANUAL THERAPY 1/> REGIONS: CPT | Performed by: PHYSICAL THERAPIST

## 2021-07-26 NOTE — PROGRESS NOTES
PT RE-Evaluation    Today's date: 2021   Patient name: Nico Delatorre   : 1975   MRN: [id number]   Referring provider: Annabel Bailon DO   Dx:   Pain in left hip        Assessment   Lilian Nettles has been compliant with attending PT and home exercise program since initial eval   Glen Osman  has made improvements in objective data since initial eval but continues to have limitations compared to prior level of function  Glen Osman continues to have deficits in the above listed impairments and would benefit from additional skilled PT to address these deficits to return to prior level of function  Impairments: abnormal coordination, abnormal gait, abnormal muscle firing, abnormal muscle tone, abnormal or restricted ROM, abnormal movement, activity intolerance, impaired balance, impaired physical strength, lacks appropriate home exercise program, pain with function and weight-bearing intolerance  Barriers to therapy: [id number] Societal implications    Understanding of Dx/Px/POC: good   Prognosis: good    Goals   Impairment Goals 4-6 weeks  - Decrease pain to <3/10 -Progressing  - Improve hip AROM to equal to the unaffected lower extremity -Progressing  - Increase knee strength to 5/5 throughout -Progressing  - Increase hip strength to 4/5 throughout -Progressing    Functional Goals 6-8 weeks  - Return to Prior Level of Function -Progressing  - Increase Functional Status Measure (FOTO) to: 75 -Progressing  - Patient will be independent with HEP -Progressing  - Patient will be able to sit without increased pain/compensation/difficulty -No change  - Patient will be able to walk without increased pain/compensation/difficulty -Progressing  - Patient will be able to bike without increased pain/compensation/difficulty -No change  - Patient will be able to participate in intercourse with her  with minimal pain -Progressing    Plan   Patient would benefit from: skilled PT  Referral necessary: No  Planned modality interventions: cryotherapy, electrical stimulation/Russian stimulation, H-Wave, TENS, thermotherapy: hydrocollator packs and unattended electrical stimulation  Planned therapy interventions: abdominal trunk stabilization, activity modification, ADL retraining, balance/weight bearing training, breathing training, body mechanics training, coordination, flexibility, functional ROM exercises, graded exercise, home exercise program, work reintegration, therapeutic training, therapeutic exercise, therapeutic activities, stretching, strengthening, self care, postural training, patient education, neuromuscular re-education, muscle pump exercises, motor coordination training, Jarrett taping, manual therapy, joint mobilization, IADL retraining, balance and gait training  Frequency: 2x week  Duration in visits: 16  Duration in weeks: 8  Treatment plan discussed with: patient, PTA and referring physician      Subjective Evaluation   Pain   Current pain rating: 3  At best pain ratin  At worst pain ratin  Location: left groin pain  Patient Goals   Patient goal: Patient reports that she wants to feel better, to strengthen the hip, and not have pain with intimacy  WORK: Patient reports that she is a  and   She takes pictures of food  She works for a [employer]  She is able to work from home  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[place], in their studio  She does have a standing desk, but is sitting for the majority of her day  HOME LIFE: Patient reports that she lives with her   Patient reports that she is responsible for cleaning, cooking, some yard work  HOBBIES/EXERCISE: She runs 3-4 days a week (30-40 mins, 3-4 miles), ON the weekends will run in the woods for 3-5 miles    Stand up paddle boards, rides her bike  Skiing, ice skate, hiking  Yoga  PAIN LOCATION/DESCRIPTORS: Patient reports that she has pain in the left groin   Feels like her underwear is too tight, pinching feeling, will radiate above and below  Feels like her hip is stuck Up in the joint socket  When the hip hurts, her ankle will start to feel really tight on the inside of her ankle  Patient notes that sometimes it will wrap under her heel  Intermittent, varies, deep  AGGRAVATING FACTORS: Sitting for prolonged periods of time  Sleeping, being still  Running will start out stiff, but will loosen up  Biking hurts the whole time  Stepping up  She notes that she will have pain with intimacy with her   Being on her hands and knees is more painful  She notes that if her bowels are more full, or she is gassy, her pain will increase  EASES: stretching  Started doing the old PT exercises she was given the last time around  Movement, tylenol- sometimes  CBD seems to help- she takes orally and will rub it on  Sometimes stretching will not help and sometimes she doesn't even remember that she has an issue  LATENCY: a few mins  At night she will stretch and roll for 45 mins and will be able to lay down and go to sleep  DAY PATTERN: Activity dependent  Will be more painful in the evening and morning  IMAGING: US: normal  X-ray: normal, slightly decreased joint space  PLOF: She reports that she has had pain for 2 months, and had a nerve entrapment in the past  Has been bothering her off and on for 1 year  HISTORY OF CURRENT INJURY: Patient reports that her hip has been bothering her off and on for about 1 year  She reports that the last 2 months, its really started to kick up  She notes that the hip is really starting to affect her daily life  She notes that the hip will pop occasionally  She does not have more pain with the popping         Objective   Tenderness     Additional Tenderness Details  Iliacus and psoas 2/4 TTP   Active Range of Motion   Lumbar   Normal active range of motion   Passive Range of Motion   Left Hip   Flexion: 120 degrees   Extension: Left hip passive extension: lacking 6 degrees  Abduction: 40 degrees   External rotation (90/90): 60 degrees   Internal rotation (90/90): 64 degrees   Right Hip   Flexion: 120 degrees   Extension: 0 degrees   Abduction: 40 degrees   External rotation (90/90): 60 degrees   Internal rotation (90/90): 40 degrees   Strength/Myotome Testing   Left Hip   Planes of Motion   Flexion: 4  Extension: 4  Abduction: 4-   Right Hip   Planes of Motion   Flexion: 4  Extension: 4+  Abduction: 4-   Tests   Left Hip   Negative pop, JOMAR and FADIR  General Comments:   Hip Comments   Increased pain with bending over: no pinch in the left hip with reaching down, but did feel pinch on return to stand  Deep squat: no pain  Diagnosis: hip flexor strain left   Precautions: n/a   Goals: improve hip flexibility, improve hip strength       Manual Therapy 7/26/21 7/13/21 7/19/21 7/22    Hip flexor TPR HJS, PT  TJH, PTA LCB, PT CF   Hip flexor PIR HJS, PT       RE HJS, PT                       Exercise Diary         Therapeutic Exercise        Hip flexor stretch with PPT   3x30 sec ea 3x30 sec ea 3 x 30" ea    Hip add stretch        Modified jeannette stretch    3x30 sec ea 3  X30" ea                                   Neuromuscular Re-education        Seated glut set        Alt glut set standing [id number] ea  10x10 sec     Standing glut ladders   10x     SB bridges  Straight/bent     10x    elevated clams        X-walks        Wobble board balance   2 mins ea 2 mins ea 2 mins ea EC    SLB on airex        Tandem balance on airex   3x30 sec ea     Tandem walking on airex   2x 2x 2x    Rebounder    Air-ex: fwd SLS 20x red; lat tandem 20x red Air-ex: fwd SLS 20x red; lat tandem 09C red   Heel clicks        Quadruped kick backs   10xea     Stool scoots   2x     Cup taps   5x on 12" box cues for knee valgus 7x on 12" box cues for knee valgus 7x on 12" box cues for knee valgus   Running man   10x ea With step up: lateral step up to 6" box; fwd step up added air-ex With step up: lateral step up to 6" box; fwd step up added air-ex   Hip hinge        Heel taps        Therapeutic Activities        Pt education Dx, strength progressions, POC    Reviewed HEP             Modalities        CP PRN

## 2021-07-29 ENCOUNTER — OFFICE VISIT (OUTPATIENT)
Dept: PHYSICAL THERAPY | Facility: CLINIC | Age: 46
End: 2021-07-29
Payer: COMMERCIAL

## 2021-07-29 ENCOUNTER — OFFICE VISIT (OUTPATIENT)
Dept: OBGYN CLINIC | Facility: MEDICAL CENTER | Age: 46
End: 2021-07-29
Payer: COMMERCIAL

## 2021-07-29 VITALS
WEIGHT: 102 LBS | HEART RATE: 80 BPM | DIASTOLIC BLOOD PRESSURE: 77 MMHG | HEIGHT: 61 IN | BODY MASS INDEX: 19.26 KG/M2 | SYSTOLIC BLOOD PRESSURE: 114 MMHG

## 2021-07-29 DIAGNOSIS — M25.552 PAIN IN LEFT HIP: Primary | ICD-10-CM

## 2021-07-29 PROCEDURE — 3008F BODY MASS INDEX DOCD: CPT | Performed by: ORTHOPAEDIC SURGERY

## 2021-07-29 PROCEDURE — 1036F TOBACCO NON-USER: CPT | Performed by: ORTHOPAEDIC SURGERY

## 2021-07-29 PROCEDURE — 97140 MANUAL THERAPY 1/> REGIONS: CPT

## 2021-07-29 PROCEDURE — 99213 OFFICE O/P EST LOW 20 MIN: CPT | Performed by: ORTHOPAEDIC SURGERY

## 2021-07-29 PROCEDURE — 97110 THERAPEUTIC EXERCISES: CPT

## 2021-07-29 PROCEDURE — 97112 NEUROMUSCULAR REEDUCATION: CPT

## 2021-07-29 NOTE — PROGRESS NOTES
Assessment/Plan     1  Pain in left hip      Orders Placed This Encounter   Procedures    FL injection left hip (arthrogram)    MRI arthrogram left hip       · Patient has persistent left hip pain despite PT and oral medications  Will order MRI arthrogram to evaluate for acetabular labrum tear  · Continue OTC NSAIDs as needed  Discontinue diclofenac due to heartburn  · Continue PT  Return for MRI arthrogram review  I answered all of the patient's questions during the visit and provided education of the patient's condition during the visit  The patient verbalized understanding of the information given and agrees with the plan  This note was dictated using GENWI software  It may contain errors including improperly dictated words  Please contact physician directly for any questions  Subjective   Chief Complaint:   Chief Complaint   Patient presents with    Left Hip - Follow-up       HPI:  Sada Aguilera is a 39 y o  female who presents for follow up for left hip pain with pincer lesion and  possible labral tear  She states she is doing the same since the last office visit  She is having achy pain that comes and goes over left groin and occasional radiating pain down to mid anterior thigh  Also describes pain as tightness  She does feel her left leg is shorter  Pain is worse with sitting and sleeping at night  Pain is better with being active and stretching  She has discontinued taking the diclofenac due to causing heartburn  She is taking over-the-counter Motrin as needed for pain and also been using over the counter topical cream  She is currently in physical therapy and states the sessions are helping with the strength but not the pain  Patient feels some relief when the therapist works on soft tissue massage  Review of Systems  See HPI for musculoskeletal review     All other systems reviewed are negative     History:  Past Medical History:   Diagnosis Date    Cluster headache Past Surgical History:   Procedure Laterality Date    ANKLE SURGERY Left 03/20    ANTERIOR CRUCIATE LIGAMENT REPAIR Right 2007    HIP SURGERY Left 2014    nerve entrapment release    WRIST SURGERY Right 03/2009    Dr Naren Whelan Right 02/07/2020     Social History   Social History     Substance and Sexual Activity   Alcohol Use Yes    Comment: Social      Social History     Substance and Sexual Activity   Drug Use No     Social History     Tobacco Use   Smoking Status Never Smoker   Smokeless Tobacco Never Used     Family History:   Family History   Problem Relation Age of Onset    Hypothyroidism Mother     Migraines Mother     Hyperlipidemia Father         Pure    Stomach cancer Paternal Grandmother 80    Ovarian cancer Maternal Grandmother 48        over age 48   Deadra Luis No Known Problems Sister     Lymphoma Maternal Grandfather 80    No Known Problems Paternal Grandfather     Prostate cancer Maternal Uncle     Prostate cancer Paternal Uncle        Current Outpatient Medications on File Prior to Visit   Medication Sig Dispense Refill    diclofenac (VOLTAREN) 75 mg EC tablet Take 1 tablet (75 mg total) by mouth 2 (two) times a day PRN for pain 60 tablet 1    EPINEPHrine (EpiPen 2-Sukhdev) 0 3 mg/0 3 mL SOAJ Inject 0 3 mL (0 3 mg total) into a muscle as needed for anaphylaxis 2 each 1    famotidine (PEPCID) 40 MG tablet TAKE 1 TABLET BY MOUTH EVERY DAY 90 tablet 3    levothyroxine 75 mcg tablet TAKE 1 TABLET BY MOUTH EVERY DAY 90 tablet 3    LO LOESTRIN FE 1 MG-10 MCG / 10 MCG TABS Take 1 tablet by mouth daily        Loratadine (CLARITIN) 10 MG CAPS Take by mouth      multivitamin (THERAGRAN) TABS Take 1 tablet by mouth daily      nabumetone (RELAFEN) 500 mg tablet Take 1 tablet twice a day after food as needed for groin pain 60 tablet 1    SUMAtriptan (IMITREX) 25 mg tablet TAKE 1 TAB AT ONSET OF MIGRAINE, MAY REPEAT X 1 IN 2HRS IF NEEDED 9 tablet 3    SUMAtriptan (IMITREX) 50 mg tablet TAKE 1 TABLET AT THE ONSET OF A MIGRAINE, MAY REPEAT IN 2 HOURS IF NEEDED 9 tablet 3     No current facility-administered medications on file prior to visit  Allergies   Allergen Reactions    Codeine      Other reaction(s): Palpitations  Reaction Date: 03Aug2011;     Molds & Smuts     Wound Dressing Adhesive Rash     Redness          Objective     /77   Pulse 80   Ht 5' 0 75" (1 543 m)   Wt 46 3 kg (102 lb)   BMI 19 43 kg/m²      PE:  AAOx 3  WDWN  Hearing intact, no drainage from eyes  no audible wheezing  no abdominal distension  LE compartments soft, skin intact    left hip:   No dislocation/deformity  Neg  [facility]  ROM: 0- 130  Int rot- 50  Ext rot- 75  Neg  Ese Test  Neg  Impingement test  No TTP over greater trochanter  Abduction: 5/5  Neg   Adilia's test  No TTP over SIJ    AT/GS intact

## 2021-07-29 NOTE — PROGRESS NOTES
Daily Note     Today's date: 2021  Patient name: Nico Delatorre  : 1975  MRN: [id number]  Referring provider: Annabel Bailon DO  Dx:   Encounter Diagnosis     ICD-10-CM    1  Pain in left hip  M25 552                   Subjective: Pt states that she saw her physician today and she is being sent for an MRI of her hip  Objective: See treatment diary below      Assessment: Tolerated treatment well  Cues for control and to monitor for knee valgus during SL strengthening ex's  No c/o increased pain, although pt reports a slight "pull" at times in her hip  Muscle fatigue noted with lunges, and cues given for form  Pt will benefit from continued therapy  Plan: Continue per plan of care        Diagnosis: hip flexor strain left   Precautions: n/a   Goals: improve hip flexibility, improve hip strength       Manual Therapy 21    Hip flexor TPR HJS, PT TJH, PTA  LCB, PT CF   Hip flexor PIR HJS, PT       RE HJS, PT                       Exercise Diary         Therapeutic Exercise        Hip flexor stretch with PPT  3x30 sec ea  3x30 sec ea 3 x 30" ea    Hip add stretch        Modified jeannette stretch  3x30 sec  3x30 sec ea 3  X30" ea                                   Neuromuscular Re-education        Seated glut set        Alt glut set standing [id number] ea       Standing glut ladders        Triple threats  10x   10x    elevated clams        X-walks        Wobble board balance  2 mins ea  2 mins ea 2 mins ea EC    SLB on airex        Tandem balance on airex  Excursions: 5x on ea        Tandem walking on airex    2x 2x    Rebounder    Air-ex: fwd SLS 20x red; lat tandem 20x red Air-ex: fwd SLS 20x red; lat tandem 06R red   Heel clicks with lift  66J      Quadruped kick backs        Stool scoots        lunges  10x ea      Cup taps  5x on 12" box  7x on 12" box cues for knee valgus 7x on 12" box cues for knee valgus   Running man    With step up: lateral step up to 6" box; fwd step up added air-ex With step up: lateral step up to 6" box; fwd step up added air-ex   Hip hinge        Heel taps        Therapeutic Activities        Pt education Dx, strength progressions, POC                 Modalities        CP PRN

## 2021-08-02 ENCOUNTER — OFFICE VISIT (OUTPATIENT)
Dept: PHYSICAL THERAPY | Facility: CLINIC | Age: 46
End: 2021-08-02
Payer: COMMERCIAL

## 2021-08-02 DIAGNOSIS — M25.552 PAIN IN LEFT HIP: Primary | ICD-10-CM

## 2021-08-02 PROCEDURE — 97112 NEUROMUSCULAR REEDUCATION: CPT

## 2021-08-02 PROCEDURE — 97140 MANUAL THERAPY 1/> REGIONS: CPT

## 2021-08-02 PROCEDURE — 97110 THERAPEUTIC EXERCISES: CPT

## 2021-08-02 NOTE — PROGRESS NOTES
Daily Note     Today's date: 2021  Patient name: Roberto Gupta  : 1975  MRN: [id number]  Referring provider: Derek Whitaker DO  Dx:   Encounter Diagnosis     ICD-10-CM    1  Pain in left hip  M25 552                   Subjective: Pt states that she is doing about the same  Saturday felt really good and yesterday she felt a little tighter in her hip  Pt has an MRI scheduled in a couple of weeks  Objective: See treatment diary below      Assessment: Tolerated treatment well  Continued with progressions from lv with improving control noted with less cues for knee valgus  Pt reports minimal discomfort with lunges but has no other complaints of pain  Tenderness noted proximal hip flexor with manual therapy  Pt reports decreased pain/tightness at end of session  Pt has good adherence to her HEP and is progressing slowly towards her goals  Plan: Continue per plan of care     Diagnosis: hip flexor strain left   Precautions: n/a   Goals: improve hip flexibility, improve hip strength       Manual Therapy 21    Hip flexor TPR HJS, PT TJH, PTA TJH, PTA  CF   Hip flexor PIR HJS, PT       RE HJS, PT                       Exercise Diary         Therapeutic Exercise        Hip flexor stretch with PPT  3x30 sec ea 3x30 sec ea  3 x 30" ea    Hip add stretch        Modified jeannette stretch  3x30 sec 3xx30 sec  3  X30" ea                                   Neuromuscular Re-education        Seated glut set        Alt glut set standing [id number] ea       Standing glut ladders        Triple threats  10x   10x    elevated clams        X-walks        Wobble board balance  2 mins ea 2 mins ea  2 mins ea EC    SLB on airex        Tandem balance on airex  Excursions: 5x on ea   Excursions: 5x on ea     Tandem walking on airex     2x    Rebounder     Air-ex: fwd SLS 20x red; lat tandem 36V red   Heel clicks with lift  00G      Quadruped kick backs        Stool scoots        lunges  10x ea 10x ea     Cup taps  5x on 12" box 5x on 12" box  7x on 12" box cues for knee valgus   Running man   On airex:  10x ea  With step up: lateral step up to 6" box; fwd step up added air-ex   Hip hinge        Heel taps   8" 10x2 ea     Therapeutic Activities        Pt education Dx, strength progressions, POC                 Modalities        CP PRN

## 2021-08-03 ENCOUNTER — TELEPHONE (OUTPATIENT)
Dept: PAIN MEDICINE | Facility: CLINIC | Age: 46
End: 2021-08-03

## 2021-08-03 DIAGNOSIS — M79.18 MYOFASCIAL PAIN SYNDROME: Primary | ICD-10-CM

## 2021-08-03 RX ORDER — METHOCARBAMOL 500 MG/1
500 TABLET, FILM COATED ORAL
Qty: 30 TABLET | Refills: 1 | Status: SHIPPED | OUTPATIENT
Start: 2021-08-03 | End: 2022-06-17

## 2021-08-03 NOTE — TELEPHONE ENCOUNTER
----- Message from [facility] sent at 7/30/2021 11:55 AM EDT -----  Regarding: MRI DISC  Patient dropped off her MRI disc  It is uploaded and in her chart for review  The disc is at the , should you need it

## 2021-08-03 NOTE — TELEPHONE ENCOUNTER
S/w pt, advised of the same  Pt verbalized understanding and agreeable to muscle relaxant  Please place order, thank you

## 2021-08-03 NOTE — TELEPHONE ENCOUNTER
Please let patient know that I did review the MRI cervical spine CT that he she dropped off and do not see any abnormalities  Suspect this is more muscular  Would recommend trying her on low-dose muscle relaxant at bedtime to help with pain if she is still symptomatic

## 2021-08-05 ENCOUNTER — OFFICE VISIT (OUTPATIENT)
Dept: PHYSICAL THERAPY | Facility: CLINIC | Age: 46
End: 2021-08-05
Payer: COMMERCIAL

## 2021-08-05 DIAGNOSIS — M25.552 PAIN IN LEFT HIP: Primary | ICD-10-CM

## 2021-08-05 PROCEDURE — 97112 NEUROMUSCULAR REEDUCATION: CPT

## 2021-08-05 PROCEDURE — 97110 THERAPEUTIC EXERCISES: CPT

## 2021-08-05 PROCEDURE — 97140 MANUAL THERAPY 1/> REGIONS: CPT

## 2021-08-05 NOTE — PROGRESS NOTES
Daily Note     Today's date: 2021  Patient name: Elizabeth Mornoy  : 1975  MRN: [id number]  Referring provider: Jer Angulo DO  Dx:   Encounter Diagnosis     ICD-10-CM    1  Pain in left hip  M25 552                   Subjective: Pt states that things have been feeling a little better this week  She reports that she ran 3 days in a row and her hip has seemed less "pinchy"  Objective: See treatment diary below      Assessment: Tolerated treatment well  Continued with progressions in strengthening increasing reps and difficulty as tolerated  Pt challenged and cues needed to monitor knee valgus, however no c/o increased pain sx's  Tenderness continues to be noted in proximal hip flexor with decreased sx's after  Reviewed HEP with pt demonstrating compliance  She is progressing slowly towards her goals  Plan: Continue per plan of care     Diagnosis: hip flexor strain left   Precautions: n/a   Goals: improve hip flexibility, improve hip strength       Manual Therapy 21    Hip flexor TPR HJS, PT TJH, PTA TJH, PTA TJH, PTA    Hip flexor PIR HJS, PT       RE HJS, PT                       Exercise Diary         Therapeutic Exercise        Hip flexor stretch with PPT  3x30 sec ea 3x30 sec ea     Hip add stretch        Modified jeannette stretch  3x30 sec 3xx30 sec 3x30 sec                                    Neuromuscular Re-education        Seated glut set        Alt glut set standing [id number] ea       Standing glut ladders        Triple threats  10x      elevated clams        X-walks        Wobble board balance  2 mins ea 2 mins ea 2 mins ea    SLB on airex        Tandem balance on airex  Excursions: 5x on ea   Excursions: 5x on ea Excursions: 5x   on ea    Tandem walking on airex        Rebounder        Heel clicks with lift  47U  15x    Quadruped kick backs        Stool scoots        lunges  10x ea 10x ea 2x10 ea    Cup taps  5x on 12" box 5x on 12" box 5x on 8" box Running man   On airex:  10x ea On airex:  10x ea     Hip hinge        Heel taps   8" 10x2 ea 8" 10x2  ea    Therapeutic Activities        Pt education Dx, strength progressions, POC     HEP discussed            Modalities        CP PRN

## 2021-08-09 ENCOUNTER — OFFICE VISIT (OUTPATIENT)
Dept: PHYSICAL THERAPY | Facility: CLINIC | Age: 46
End: 2021-08-09
Payer: COMMERCIAL

## 2021-08-09 DIAGNOSIS — M25.552 PAIN IN LEFT HIP: Primary | ICD-10-CM

## 2021-08-09 PROCEDURE — 97140 MANUAL THERAPY 1/> REGIONS: CPT

## 2021-08-09 PROCEDURE — 97112 NEUROMUSCULAR REEDUCATION: CPT

## 2021-08-09 NOTE — PROGRESS NOTES
Daily Note     Today's date: 2021  Patient name: Elizabeth Monroy  : 1975  MRN: [id number]  Referring provider: Jer Angulo DO  Dx:   Encounter Diagnosis     ICD-10-CM    1  Pain in left hip  M25 552                   Subjective: Pt states that her hip has been feeling pretty good overall  She reports that Friday it was "pinchy" and sometimes at night she feels it  Objective: See treatment diary below      Assessment: Tolerated treatment well  Continued with outlined progressions in hip strengthening with cues to avoid knee valgus during SL ex's  Pt continues to have tenderness at proximal hip flexor and lateral hip this visit  Tenderness does decrease with manual therapy  Pt will benefit from continued therapy  Plan: Continue per plan of care     Diagnosis: hip flexor strain left   Precautions: n/a   Goals: improve hip flexibility, improve hip strength       Manual Therapy 21   Hip flexor TPR HJS, PT TJH, PTA TJH, PTA TJH, PTA    Hip flexor PIR HJS, PT       RE HJS, PT                       Exercise Diary         Therapeutic Exercise        Hip flexor stretch with PPT  3x30 sec ea 3x30 sec ea     Hip add stretch        Modified jeannette stretch  3x30 sec 3xx30 sec 3x30 sec 3x30 sec                                   Neuromuscular Re-education        Seated glut set        Alt glut set standing [id number] ea       Standing glut ladders        Triple threats  10x      elevated clams        X-walks        Wobble board balance  2 mins ea 2 mins ea 2 mins ea 2 mins ea   SLB on airex        Tandem balance on airex  Excursions: 5x on ea   Excursions: 5x on ea Excursions: 5x   on ea Excursions: 5x on ea   Tandem walking on airex        Rebounder        Heel clicks with lift  67L  15x 15x   Quadruped kick backs        Stool scoots        lunges  10x ea 10x ea 2x10 ea Walking lunge 4x with cues for form   Cup taps  5x on 12" box 5x on 12" box 5x on 8" box 5x on 8" box   Running man   On airex:  10x ea On airex:  10x ea  On airex: 10x ea   Hip hinge     10x ea   Heel taps   8" 10x2 ea 8" 10x2  ea 8" 10x2 ea   Therapeutic Activities        Pt education Dx, strength progressions, POC     HEP discussed            Modalities        CP PRN

## 2021-08-12 ENCOUNTER — OFFICE VISIT (OUTPATIENT)
Dept: PHYSICAL THERAPY | Facility: CLINIC | Age: 46
End: 2021-08-12
Payer: COMMERCIAL

## 2021-08-12 DIAGNOSIS — M25.552 PAIN IN LEFT HIP: Primary | ICD-10-CM

## 2021-08-12 PROCEDURE — 97112 NEUROMUSCULAR REEDUCATION: CPT

## 2021-08-12 PROCEDURE — 97140 MANUAL THERAPY 1/> REGIONS: CPT

## 2021-08-12 NOTE — PROGRESS NOTES
Daily Note     Today's date: 2021  Patient name: Lelo Allred  : 1975  MRN: [id number]  Referring provider: Michael Cerda DO  Dx:   Encounter Diagnosis     ICD-10-CM    1  Pain in left hip  M25 552                   Subjective: Pt states that she can still feel she is getting stronger and some days are not as painful  She reports that some days she does still have the "pinchy" feeling  Objective: See treatment diary below      Assessment: Tolerated treatment well  Progressed pt with strengthening ex's where able with no c/o increased pain sx's  Cues for form needed at times and to monitor for knee valgus, with pt able to correct when needed  Continued tenderness is noted in pts R proximal hip flexor and lateral hip  Tenderness was decreased with manual therapy  Pt progressing slowly towards her goals  Plan: Continue per plan of care     Diagnosis: hip flexor strain left   Precautions: n/a   Goals: improve hip flexibility, improve hip strength       Manual Therapy 21   Hip flexor TPR TJH, PTA  TJH, PTA TJH, PTA    Hip flexor PIR        RE                        Exercise Diary         Therapeutic Exercise        Hip flexor stretch with PPT   3x30 sec ea     Hip add stretch        Modified jeannette stretch 3x30 sec  3xx30 sec 3x30 sec 3x30 sec                                   Neuromuscular Re-education        Seated glut set        Alt glut set standing        Standing glut ladders        Triple threats        elevated clams        X-walks        Wobble board balance 2 mins ea  2 mins ea 2 mins ea 2 mins ea   SLB on airex        Excursions 5x on ea  Excursions: 5x on ea Excursions: 5x   on ea Excursions: 5x on ea   Mini squats on upside down bosu 10x2       Rebounder        Heel clicks with lift 55B   15x 15x   Quadruped kick backs        Stool scoots        Walking lunges 4x  10x ea 2x10 ea Walking lunge 4x with cues for form   Cup taps 8" box: 5x  5x on 12" box 5x on 8" box 5x on 8" box   Running man  On airex 10x ea  On airex:  10x ea On airex:  10x ea  On airex: 10x ea   Hip hinge on airex 10x ea    10x ea   Heel taps 8" 10x2 ea  8" 10x2 ea 8" 10x2  ea 8" 10x2 ea   Therapeutic Activities        Pt education    HEP discussed            Modalities        CP PRN

## 2021-08-16 ENCOUNTER — OFFICE VISIT (OUTPATIENT)
Dept: PHYSICAL THERAPY | Facility: CLINIC | Age: 46
End: 2021-08-16
Payer: COMMERCIAL

## 2021-08-16 DIAGNOSIS — M25.552 PAIN IN LEFT HIP: Primary | ICD-10-CM

## 2021-08-16 PROCEDURE — 97140 MANUAL THERAPY 1/> REGIONS: CPT

## 2021-08-16 PROCEDURE — 97112 NEUROMUSCULAR REEDUCATION: CPT

## 2021-08-16 PROCEDURE — 97110 THERAPEUTIC EXERCISES: CPT

## 2021-08-16 NOTE — PROGRESS NOTES
Daily Note     Today's date: 2021  Patient name: Etelvina Aguilar  : 1975  MRN: [id number]  Referring provider: Sis Partida DO  Dx:   Encounter Diagnosis     ICD-10-CM    1  Pain in left hip  M25 552                   Subjective: Pt states that her hip is really feeling much better over the weekend  She states that she wonders if it is from working on the side of her hip during manual therapy  Pt reports that her R knee has been bothering her and she wonders if it is from the lunges and some of the other bending ex's  Objective: See treatment diary below      Assessment: Tolerated treatment well  Session was modified to accommodate pt's request due to increased R knee pain  Cues for core stability during quadruped activities with good carryover noted  No c/o increased pain throughout modified session  Continued tenderness is noted throughout proximal hip flexor and lateral hip during manual therapy  Will progress nv as able  Pt will benefit from continued therapy  Plan: Continue per plan of care     Diagnosis: hip flexor strain left   Precautions: n/a   Goals: improve hip flexibility, improve hip strength       Manual Therapy 21   Hip flexor TPR TJH, PTA + lateral hip:TJH, PTA  TJH, PTA    Hip flexor PIR        RE                        Exercise Diary         Therapeutic Exercise        Hip flexor stretch with PPT        Hip add stretch        Modified jeannette stretch 3x30 sec 3x30 sec  3x30 sec 3x30 sec                                   Neuromuscular Re-education        Seated glut set        Alt glut set standing        Standing glut ladders        Triple threats        elevated clams        X-walks        Wobble board balance 2 mins ea 2 minse a  2 mins ea 2 mins ea   SLB on airex  3x30 sec ea      Excursions 5x on ea On L only:  5x ea  Excursions: 5x   on ea Excursions: 5x on ea   Mini squats on upside down bosu 10x2 HOLD      Rebounder        Heel clicks with lift 32S 62R  15x 15x   Quadruped kick backs  With alt UE  10x ea      Quadruped fire hydrants  10x ea      Stool scoots  5# 2x      Walking lunges 4x HOLD  2x10 ea Walking lunge 4x with cues for form   Cup taps 8" box: 5x   5x on 8" box 5x on 8" box   Running man  On airex 10x ea   On airex:  10x ea  On airex: 10x ea   Hip hinge on airex 10x ea 10x2 ea   10x ea   Heel taps 8" 10x2 ea   8" 10x2  ea 8" 10x2 ea   Therapeutic Activities        Pt education    HEP discussed            Modalities        CP PRN

## 2021-08-17 NOTE — NURSING NOTE
Call placed to patient to discuss upcoming left hip arthrogram at [address] [place] [address] Radiology  Allergies reviewed and verified that patient does not currently take any anticoagulant medications  Pre procedure instructions including diet and taking own medications discussed with patient  Patient instructed that she may eat normally and take medications as usual before the procedure  Patient verbalizes understanding  Per patient has had an arthrogram before and has no further questions at this time  Reminded of the location, date and time of the expected procedure

## 2021-08-19 ENCOUNTER — OFFICE VISIT (OUTPATIENT)
Dept: PHYSICAL THERAPY | Facility: CLINIC | Age: 46
End: 2021-08-19
Payer: COMMERCIAL

## 2021-08-19 DIAGNOSIS — M25.552 PAIN IN LEFT HIP: Primary | ICD-10-CM

## 2021-08-19 PROCEDURE — 97530 THERAPEUTIC ACTIVITIES: CPT

## 2021-08-19 PROCEDURE — 97112 NEUROMUSCULAR REEDUCATION: CPT

## 2021-08-19 PROCEDURE — 97140 MANUAL THERAPY 1/> REGIONS: CPT

## 2021-08-19 NOTE — PROGRESS NOTES
Daily Note     Today's date: 2021  Patient name: Stewart Gordon  : 1975  MRN: [id number]  Referring provider: Sharon Thakur DO  Dx:   Encounter Diagnosis     ICD-10-CM    1  Pain in left hip  M25 552                   Subjective: Pt reports that her hip is still "there" but not as bad as it once was  She states that her R knee is getting better but still feels "tight"  Objective: See treatment diary below      Assessment: Tolerated treatment well  Pt able to tolerate SL strengthening on the R this session with less knee pain since her lv  Few cues for form and technique and to monitor knee valgus B/L  Pt continues to have tenderness in her L hip at proximal hip flexor and lateral hip  Decreased tenderness noted after  Pt progressing slowly towards her goals  Plan: Continue per plan of care     Diagnosis: hip flexor strain left   Precautions: n/a   Goals: improve hip flexibility, improve hip strength       Manual Therapy 21   Hip flexor TPR TJH, PTA + lateral hip:TJH, PTA + lateral hip:  TJH, PTA     Hip flexor PIR        RE                        Exercise Diary         Therapeutic Exercise        Hip flexor stretch with PPT        Hip add stretch        Modified jeannette stretch 3x30 sec 3x30 sec 3x30 sec   B/L  3x30 sec                                   Neuromuscular Re-education        Seated glut set        Alt glut set standing        Standing glut ladders        Triple threats        elevated clams        X-walks        Wobble board balance 2 mins ea 2 minse a 2 mins ea  2 mins ea   SLB on airex  3x30 sec ea 3x30 sec ea     Excursions 5x on ea On L only:  5x ea B/L   5x ea  Excursions: 5x on ea   Mini squats on upside down bosu 10x2 HOLD      Rebounder        Heel clicks with lift 19T 25R   15x   Quadruped kick backs  With alt UE  10x ea      Quadruped fire hydrants  10x ea      Stool scoots  5# 2x 5# 2x     Walking lunges 4x HOLD   Walking lunge 4x with cues for form   Cup taps 8" box: 5x    5x on 8" box   Running man  On airex 10x ea    On airex: 10x ea   Hip hinge on airex 10x ea 10x2 ea 10# KB  10x  10x ea   Heel taps 8" 10x2 ea  8" 10x  8" 10x2 ea   Therapeutic Activities        Pt education   HEP discussed             Modalities        CP PRN

## 2021-08-23 ENCOUNTER — HOSPITAL ENCOUNTER (OUTPATIENT)
Dept: RADIOLOGY | Facility: HOSPITAL | Age: 46
Discharge: HOME | End: 2021-08-23
Attending: ORTHOPAEDIC SURGERY
Payer: COMMERCIAL

## 2021-08-23 ENCOUNTER — OFFICE VISIT (OUTPATIENT)
Dept: PHYSICAL THERAPY | Facility: CLINIC | Age: 46
End: 2021-08-23
Payer: COMMERCIAL

## 2021-08-23 ENCOUNTER — HOSPITAL ENCOUNTER (OUTPATIENT)
Dept: MRI IMAGING | Facility: HOSPITAL | Age: 46
Discharge: HOME | End: 2021-08-23
Attending: ORTHOPAEDIC SURGERY
Payer: COMMERCIAL

## 2021-08-23 DIAGNOSIS — M25.552 PAIN IN LEFT HIP: ICD-10-CM

## 2021-08-23 DIAGNOSIS — M25.552 PAIN IN LEFT HIP: Primary | ICD-10-CM

## 2021-08-23 PROCEDURE — 77002 NEEDLE LOCALIZATION BY XRAY: CPT

## 2021-08-23 PROCEDURE — G1004 CDSM NDSC: HCPCS

## 2021-08-23 PROCEDURE — 97112 NEUROMUSCULAR REEDUCATION: CPT

## 2021-08-23 PROCEDURE — 73722 MRI JOINT OF LWR EXTR W/DYE: CPT

## 2021-08-23 PROCEDURE — 27095 INJECTION FOR HIP X-RAY: CPT

## 2021-08-23 PROCEDURE — 97140 MANUAL THERAPY 1/> REGIONS: CPT

## 2021-08-23 PROCEDURE — 97110 THERAPEUTIC EXERCISES: CPT

## 2021-08-23 PROCEDURE — A9585 GADOBUTROL INJECTION: HCPCS | Performed by: ORTHOPAEDIC SURGERY

## 2021-08-23 RX ORDER — LIDOCAINE HYDROCHLORIDE 10 MG/ML
5 INJECTION, SOLUTION EPIDURAL; INFILTRATION; INTRACAUDAL; PERINEURAL ONCE
Status: COMPLETED | OUTPATIENT
Start: 2021-08-23 | End: 2021-08-23

## 2021-08-23 RX ADMIN — LIDOCAINE HYDROCHLORIDE 5 ML: 10 INJECTION, SOLUTION EPIDURAL; INFILTRATION; INTRACAUDAL at 11:10

## 2021-08-23 RX ADMIN — IOHEXOL 2 ML: 300 INJECTION, SOLUTION INTRAVENOUS at 11:10

## 2021-08-23 RX ADMIN — GADOBUTROL 0.2 ML: 604.72 INJECTION INTRAVENOUS at 11:35

## 2021-08-23 NOTE — PROGRESS NOTES
Daily Note     Today's date: 2021  Patient name: Angelic Wynn  : 1975  MRN: [id number]  Referring provider: Radha Robledo DO  Dx:   Encounter Diagnosis     ICD-10-CM    1  Pain in left hip  M25 552                   Subjective: Pt states that she went easy this weekend and didn't do anything to irritate her knee or hip  She states that her hip is stiff this morning but she does think that it is still making progress  Objective: See treatment diary below      Assessment: Tolerated treatment well  Pt able to perform SL ex's with no c/o knee pain on R and improving form with less cues needed  Pt challenged with SL deadlift but demonstrated better control  Continued tenderness is noted in L hip with decreased pain reported after  Pt progressing slowly towards her goals  Plan: Continue per plan of care     Diagnosis: hip flexor strain left   Precautions: n/a   Goals: improve hip flexibility, improve hip strength       Manual Therapy 21    Hip flexor TPR TJH, PTA + lateral hip:TJH, PTA + lateral hip:  Amy [address], PTA + lateral hip:  TJH, PTA    Hip flexor PIR        RE                        Exercise Diary         Therapeutic Exercise        Hip flexor stretch with PPT        Hip add stretch        Modified jeannette stretch 3x30 sec 3x30 sec 3x30 sec   B/L 3x30 sec   B/L                                    Neuromuscular Re-education        Seated glut set        Alt glut set standing        Standing glut ladders        Triple threats        elevated clams        X-walks        Wobble board balance 2 mins ea 2 minse a 2 mins ea 2 mins ea    SLB on airex  3x30 sec ea 3x30 sec ea 3x30 sec ea    Excursions 5x on ea On L only:  5x ea B/L   5x ea B/L  5x ea    Mini squats on upside down bosu 10x2 HOLD      Rebounder        Heel clicks with lift 85Q 64M  NV    Quadruped kick backs  With alt UE  10x ea  With alt UE  10x ea    Quadruped fire hydrants  10x ea  10x ea    Stool scoots  5# 2x 5# 2x     Walking lunges 4x HOLD  HOLD    Cup taps 8" box: 5x   8" box: 5x    Running man  On airex 10x ea       Hip hinge on airex 10x ea 10x2 ea 10# KB  10x 10# KB  10x2 on ea    Heel taps 8" 10x2 ea  8" 10x     Therapeutic Activities        Pt education   HEP discussed             Modalities        CP PRN

## 2021-08-26 ENCOUNTER — TELEPHONE (OUTPATIENT)
Dept: RADIOLOGY | Facility: MEDICAL CENTER | Age: 46
End: 2021-08-26

## 2021-08-26 ENCOUNTER — OFFICE VISIT (OUTPATIENT)
Dept: OBGYN CLINIC | Facility: MEDICAL CENTER | Age: 46
End: 2021-08-26
Payer: COMMERCIAL

## 2021-08-26 ENCOUNTER — TELEPHONE (OUTPATIENT)
Dept: PAIN MEDICINE | Facility: CLINIC | Age: 46
End: 2021-08-26

## 2021-08-26 ENCOUNTER — EVALUATION (OUTPATIENT)
Dept: PHYSICAL THERAPY | Facility: CLINIC | Age: 46
End: 2021-08-26
Payer: COMMERCIAL

## 2021-08-26 VITALS
SYSTOLIC BLOOD PRESSURE: 113 MMHG | HEART RATE: 86 BPM | WEIGHT: 101.8 LBS | DIASTOLIC BLOOD PRESSURE: 79 MMHG | BODY MASS INDEX: 19.39 KG/M2

## 2021-08-26 DIAGNOSIS — M25.552 PAIN IN LEFT HIP: Primary | ICD-10-CM

## 2021-08-26 DIAGNOSIS — S76.212A STRAIN OF LEFT GROIN: ICD-10-CM

## 2021-08-26 PROCEDURE — 97140 MANUAL THERAPY 1/> REGIONS: CPT | Performed by: PHYSICAL THERAPIST

## 2021-08-26 PROCEDURE — 97112 NEUROMUSCULAR REEDUCATION: CPT | Performed by: PHYSICAL THERAPIST

## 2021-08-26 PROCEDURE — 97530 THERAPEUTIC ACTIVITIES: CPT | Performed by: PHYSICAL THERAPIST

## 2021-08-26 PROCEDURE — 99213 OFFICE O/P EST LOW 20 MIN: CPT | Performed by: ORTHOPAEDIC SURGERY

## 2021-08-26 PROCEDURE — 1036F TOBACCO NON-USER: CPT | Performed by: ORTHOPAEDIC SURGERY

## 2021-08-26 NOTE — TELEPHONE ENCOUNTER
S/w pt, states she saw Dr Pau Enrique today and it was recommended that pt reach out to [facility] for L hip steroid injection  Please advise, thank you

## 2021-08-26 NOTE — PROGRESS NOTES
PT RE-Evaluation    Today's date: 2021   Patient name: Stewart Gordno   : 1975   MRN: [id number]   Referring provider: Sharon Thakur DO   Dx:   Pain in left hip        Assessment   Lilian Nettles has been compliant with attending PT and home exercise program since initial eval   Alayna Araya  has made improvements in objective data since initial eval but continues to have limitations compared to prior level of function  Alayna Araya continues to have deficits in the above listed impairments and would benefit from additional skilled PT to address these deficits to return to prior level of function  Impairments: abnormal coordination, abnormal gait, abnormal muscle firing, abnormal muscle tone, abnormal or restricted ROM, abnormal movement, activity intolerance, impaired balance, impaired physical strength, lacks appropriate home exercise program, pain with function and weight-bearing intolerance  Barriers to therapy: [id number] Societal implications    Understanding of Dx/Px/POC: good   Prognosis: good    Goals   Impairment Goals 4-6 weeks  - Decrease pain to <3/10 -Progressing  - Improve hip AROM to equal to the unaffected lower extremity -Progressing  - Increase knee strength to 5/5 throughout -Progressing  - Increase hip strength to 4/5 throughout -Progressing    Functional Goals 6-8 weeks  - Return to Prior Level of Function -Progressing  - Increase Functional Status Measure (FOTO) to: 75 -Progressing  - Patient will be independent with HEP -Progressing  - Patient will be able to sit without increased pain/compensation/difficulty -No change  - Patient will be able to walk without increased pain/compensation/difficulty -Progressing  - Patient will be able to bike without increased pain/compensation/difficulty -No change  - Patient will be able to participate in intercourse with her  with minimal pain -Progressing    Plan   Patient would benefit from: skilled PT  Referral necessary: No  Planned modality interventions: cryotherapy, electrical stimulation/Russian stimulation, H-Wave, TENS, thermotherapy: hydrocollator packs and unattended electrical stimulation  Planned therapy interventions: abdominal trunk stabilization, activity modification, ADL retraining, balance/weight bearing training, breathing training, body mechanics training, coordination, flexibility, functional ROM exercises, graded exercise, home exercise program, work reintegration, therapeutic training, therapeutic exercise, therapeutic activities, stretching, strengthening, self care, postural training, patient education, neuromuscular re-education, muscle pump exercises, motor coordination training, Jarrett taping, manual therapy, joint mobilization, IADL retraining, balance and gait training  Frequency: 2x week  Duration in visits: 16  Duration in weeks: 8  Treatment plan discussed with: patient, PTA and referring physician      Subjective Evaluation   Pain   Current pain rating: 3-4  At best pain ratin  At worst pain ratin, prior to the MRI it didn't get above a 3/10  Location: left groin pain  Patient Goals   Patient goal: Patient reports that she wants to feel better, to strengthen the hip, and not have pain with intimacy  -Progressing       Patient reports that she feels about 65% improved since starting PT  She reports that since the arthrogram MRI, she feels about 40%, she states that this really flared things up  HOBBIES/EXERCISE: She runs 3-4 days a week (30-40 mins, 3-4 miles), ON the weekends will run in the woods for 3-5 miles  hiking  Yoga  Stand up paddle boards,  Has been avoiding: rides on her bike, Skiing, ice skate,     PAIN LOCATION/DESCRIPTORS: Patient reports that she has pain in the left groin  Feels like her underwear is too tight occasionally, pinching feeling, will radiate above and below  Feels like her hip is stuck Up in the joint socket  Intermittent, varies, deep      NO longer notes pain in her ankle        AGGRAVATING FACTORS:   Patient reports that the following have improved, but still needs work: Sitting for prolonged periods of time  Sleeping, being still  Running will start out stiff, but will loosen up  She notes that she will have pain with intimacy with her   Being on her hands and knees is more painful  She notes that if her bowels are more full, or she is gassy, her pain will increase  Avoids: Biking hurts the whole time  Notes no difficulty at all with: Stepping up  Objective   Tenderness     Additional Tenderness Details  Iliacus and psoas 2/4 TTP   Active Range of Motion   Lumbar   Normal active range of motion   Passive Range of Motion   Left Hip   Flexion: 120 degrees   Extension: Left hip passive extension: lacking 16 degrees  Abduction: 44 degrees   External rotation (90/90): 66 degrees   Internal rotation (90/90): 66 degrees   Right Hip   Flexion: 120 degrees   Extension: 0 degrees   Abduction: 40 degrees   External rotation (90/90): 60 degrees   Internal rotation (90/90): 40 degrees     Strength/Myotome Testing   Left Hip   Planes of Motion   Flexion: 4  Extension: 4  Abduction: 4  Right Hip   Planes of Motion   Flexion: 4  Extension: 4+  Abduction: 4     Tests   Left Hip   Negative scour, JOMAR and FADIR  General Comments:   Hip Comments   Increased pain with bending over: no pinch with bending forward or return to stand  Deep squat: no pain  Diagnosis: hip flexor strain left   Precautions: n/a   Goals: improve hip flexibility, improve hip strength       Manual Therapy 8/12/21 8/16/21 8/19/21 8/23/21 8/26/21   Hip flexor TPR TJH, PTA + lateral hip:TJH, PTA + lateral hip:  [address], PTA + lateral hip:  TJH, PTA    Hip flexor PIR        RE     HJS, PT                   Exercise Diary         Therapeutic Exercise        Hip flexor stretch with PPT        Hip add stretch        Modified jeannette stretch 3x30 sec 3x30 sec 3x30 sec   B/L 3x30 sec   B/L Neuromuscular Re-education        Seated glut set        Alt glut set standing        Standing glut ladders        Triple threats        elevated clams        X-walks        Wobble board balance 2 mins ea 2 minse a 2 mins ea 2 mins ea EC: 2 mins ea   SLB on airex  3x30 sec ea 3x30 sec ea 3x30 sec ea    Excursions 5x on ea On L only:  5x ea B/L   5x ea B/L  5x ea    Mini squats on upside down bosu 10x2 HOLD      Rebounder        Heel clicks with lift 51M 54M  NV 15x   Quadruped kick backs  With alt UE  10x ea  With alt UE  10x ea 10x ea   Quadruped fire hydrants  10x ea  10x ea 10x ea   Stool scoots  5# 2x 5# 2x     Walking lunges 4x HOLD  HOLD    Cup taps 8" box: 5x   8" box: 5x    Running man  On airex 10x ea       Hip hinge on airex 10x ea 10x2 ea 10# KB  10x 10# KB  10x2 on ea    Heel taps 8" 10x2 ea  8" 10x     Therapeutic Activities        Pt education   HEP discussed  HEP reveiwed  Progress discussed  All questions/concerns addressed              Modalities        CP PRN

## 2021-08-26 NOTE — PROGRESS NOTES
Assessment/Plan     1  Pain in left hip    2  Strain of left groin      Orders Placed This Encounter   Procedures    Steroid Injection     Reviewed MRI arthrogram and physical exam with patient at time of visit  Referral for guided CS injection of left hip  Discussed with patient that because she has ongoing lower back pain that she may benefit from evaluation from spine and pain  I suggest that she make and appointment with Dr Miguel Fernandez or Dr Lauren John for 2-3 weeks following her hip injection for evaluation of lumbar spine if her pain persists despite the injection  Return 6 weeks post injection  I answered all of the patient's questions during the visit and provided education of the patient's condition during the visit  The patient verbalized understanding of the information given and agrees with the plan  This note was dictated using [employer] software  It may contain errors including improperly dictated words  Please contact physician directly for any questions  Subjective   Chief Complaint: No chief complaint on file  HPI:  Derek Granados is a 39 y o  female who presents for follow up for left hip pain  She was last seen in regards to this issue on 7/29/2021, at which time she been participating with formal physical therapy and was still symptomatic  She was then referred for MRI arthrogram of the left hip rule out presence of labral tear  His presentation she is that she was beginning to see improvements with formal physical therapy, feels that the recent arthrogram cause exacerbation of her discomfort  She reports nonpainful, repetitive clicking in the right groin specifically with hip abduction movements  She denies any sent onset bruising, skin, numbness, tingling, or feelings of instability  Review of Systems  See HPI for musculoskeletal review     All other systems reviewed are negative     History:  Past Medical History:   Diagnosis Date    Cluster headache      Past Surgical History:   Procedure Laterality Date    ANKLE SURGERY Left 03/20    ANTERIOR CRUCIATE LIGAMENT REPAIR Right 2007   [facility]JECTION [facility]ROGRAM)  8/23/2021    HIP SURGERY Left 2014    nerve entrapment release    WRIST SURGERY Right 03/2009    Dr Hoa Tamez Right 02/07/2020     Social History   Social History     Substance and Sexual Activity   Alcohol Use Yes    Comment: Social      Social History     Substance and Sexual Activity   Drug Use No     Social History     Tobacco Use   Smoking Status Never Smoker   Smokeless Tobacco Never Used     Family History:   Family History   Problem Relation Age of Onset    Hypothyroidism Mother     Migraines Mother     Hyperlipidemia Father         Pure    Stomach cancer Paternal Grandmother 80    Ovarian cancer Maternal Grandmother 48        over age 48   Levora Brothers No Known Problems Sister     Lymphoma Maternal Grandfather 80    No Known Problems Paternal Grandfather     Prostate cancer Maternal Uncle     Prostate cancer Paternal Uncle        Current Outpatient Medications on File Prior to Visit   Medication Sig Dispense Refill    diclofenac (VOLTAREN) 75 mg EC tablet Take 1 tablet (75 mg total) by mouth 2 (two) times a day PRN for pain 60 tablet 1    EPINEPHrine (EpiPen 2-Sukhdev) 0 3 mg/0 3 mL SOAJ Inject 0 3 mL (0 3 mg total) into a muscle as needed for anaphylaxis 2 each 1    famotidine (PEPCID) 40 MG tablet TAKE 1 TABLET BY MOUTH EVERY DAY 90 tablet 3    levothyroxine 75 mcg tablet TAKE 1 TABLET BY MOUTH EVERY DAY 90 tablet 3    LO LOESTRIN FE 1 MG-10 MCG / 10 MCG TABS Take 1 tablet by mouth daily        Loratadine (CLARITIN) 10 MG CAPS Take by mouth      methocarbamol (ROBAXIN) 500 mg tablet Take 1 tablet (500 mg total) by mouth daily at bedtime as needed for muscle spasms 30 tablet 1    multivitamin (THERAGRAN) TABS Take 1 tablet by mouth daily      nabumetone (RELAFEN) 500 mg tablet Take 1 tablet twice a day after food as needed for groin pain 60 tablet 1    SUMAtriptan (IMITREX) 25 mg tablet TAKE 1 TAB AT ONSET OF MIGRAINE, MAY REPEAT X 1 IN 2HRS IF NEEDED 9 tablet 3    SUMAtriptan (IMITREX) 50 mg tablet TAKE 1 TABLET AT THE ONSET OF A MIGRAINE, MAY REPEAT IN 2 HOURS IF NEEDED 9 tablet 3     No current facility-administered medications on file prior to visit  Allergies   Allergen Reactions    Codeine      Other reaction(s): Palpitations  Reaction Date: 03Aug2011;     Molds & Smuts     Wound Dressing Adhesive Rash     Redness          Objective     /79   Pulse 86   Wt 46 2 kg (101 lb 12 8 oz)   BMI 19 39 kg/m²      PE:  AAOx 3  WDWN  Hearing intact, no drainage from eyes  no audible wheezing  no abdominal distension  LE compartments soft, skin intact    left hip:   No dislocation/deformity  Neg  Stinchfield  ROM: full, tightness w/ extremes of ER and flexion  Neg  Ese Test  + Impingement test  No TTP over greater trochanter, Nontender over left SI or lumbar midline  Abduction: 5/5  Neg   Adilia's test  No TTP over SIJ    AT/GS intact    Imaging Studies:    Attending Physician has personally reviewed pertinent imaging in PACS, impression is as follows:    Review of MRI arthrogram series taken 8/23/2021 of the Left hip shows no sign of acute pathology or labral tear    Scribe Attestation    I,:  Shona Michael am acting as a scribe while in the presence of the attending physician :       I,:  Ml Dent, DO personally performed the services described in this documentation    as scribed in my presence :

## 2021-08-27 DIAGNOSIS — K21.9 GASTROESOPHAGEAL REFLUX DISEASE WITHOUT ESOPHAGITIS: ICD-10-CM

## 2021-08-27 RX ORDER — FAMOTIDINE 40 MG/1
TABLET, FILM COATED ORAL
Qty: 270 TABLET | Refills: 1 | Status: SHIPPED | OUTPATIENT
Start: 2021-08-27

## 2021-08-30 ENCOUNTER — OFFICE VISIT (OUTPATIENT)
Dept: PHYSICAL THERAPY | Facility: CLINIC | Age: 46
End: 2021-08-30
Payer: COMMERCIAL

## 2021-08-30 DIAGNOSIS — M25.552 PAIN IN LEFT HIP: Primary | ICD-10-CM

## 2021-08-30 PROCEDURE — 97110 THERAPEUTIC EXERCISES: CPT

## 2021-08-30 PROCEDURE — 97140 MANUAL THERAPY 1/> REGIONS: CPT

## 2021-08-30 PROCEDURE — 97112 NEUROMUSCULAR REEDUCATION: CPT

## 2021-08-30 NOTE — PROGRESS NOTES
Daily Note     Today's date: 2021  Patient name: Joselyn Guerra  : 1975  MRN: [id number]  Referring provider: Anali Arthur DO  Dx:   Encounter Diagnosis     ICD-10-CM    1  Pain in left hip  M25 552                   Subjective: Pt states that her hip is feeling ok this morning  She reports that her knee is feeling better too  Objective: See treatment diary below      Assessment: Tolerated treatment well  Pt able to slowly integrate ex's back into her program which had irritated her knee  No c/o increased hip or knee pain this visit  Continued tenderness is noted in pt's hip flexor and lateral hip during manual therapy  Relief of tightness is noted  Pt progressing slowly towards her goals  Plan: Continue per plan of care          Diagnosis: hip flexor strain left   Precautions: n/a   Goals: improve hip flexibility, improve hip strength       Manual Therapy 21   Hip flexor TPR + lateral hip:  [address], PTA  + lateral hip:  [address], PTA + lateral hip:  [address], PTA    Hip flexor PIR        RE     HJS, PT                   Exercise Diary         Therapeutic Exercise        Hip flexor stretch with PPT        Hip add stretch        Modified jeannette stretch 3x30 sec   B/L  3x30 sec   B/L 3x30 sec   B/L                                    Neuromuscular Re-education        Seated glut set        Alt glut set standing        Standing glut ladders        Triple threats        elevated clams        X-walks        Wobble board balance with EC 2 mins ea  2 mins ea 2 mins ea EC: 2 mins ea   SLB on airex   3x30 sec ea 3x30 sec ea    Excursions 5x ea  B/L   5x ea B/L  5x ea    Mini squats on upside down bosu        Rebounder        Heel clicks with lift 46D   NV 15x   Quadruped kick backs with alt UE 10x ea   With alt UE  10x ea 10x ea   Quadruped fire hydrants 10x ea   10x ea 10x ea   Stool scoots 5# 2x  5# 2x     Walking lunges    HOLD    Cup taps 14" box's stacked: 5x   8" box: 5x    Running man  On airex        Hip hinge on airex 10# KB  10x2 on ea  10# KB  10x 10# KB  10x2 on ea    Heel taps 8" 10x2  8" 10x     Therapeutic Activities        Pt education   HEP discussed  HEP reveiwed  Progress discussed  All questions/concerns addressed              Modalities        CP PRN

## 2021-08-31 ENCOUNTER — OFFICE VISIT (OUTPATIENT)
Dept: PHYSICAL THERAPY | Facility: CLINIC | Age: 46
End: 2021-08-31
Payer: COMMERCIAL

## 2021-08-31 DIAGNOSIS — M25.552 PAIN IN LEFT HIP: Primary | ICD-10-CM

## 2021-08-31 PROCEDURE — 97140 MANUAL THERAPY 1/> REGIONS: CPT

## 2021-08-31 PROCEDURE — 97112 NEUROMUSCULAR REEDUCATION: CPT

## 2021-08-31 NOTE — PROGRESS NOTES
Daily Note     Today's date: 2021  Patient name: Shane Alarcon  : 1975  MRN: [id number]  Referring provider: Ani Manley DO  Dx:   Encounter Diagnosis     ICD-10-CM    1  Pain in left hip  M25 552                   Subjective: Pt reports that she is still feeling a little tight in her hip this morning, but states that she really didn't do anything yesterday to irritate it  She reports that she tries to stand more when working at her desk  Objective: See treatment diary below      Assessment: Tolerated treatment well  Progressed pt with hip strengthening while monitoring to avoid R knee sx's  Pt demonstrates muscle fatigue with leg press and requires minimal cues for knee valgus B/L  Continued tenderness noted in proximal hip flexor  Demonstrated ITB stretch on L to address pt's tightness in ITB  Reviewed HEP with pt reporting understanding  Plan: Continue per plan of care     Diagnosis: hip flexor strain left   Precautions: n/a   Goals: improve hip flexibility, improve hip strength  Manual Therapy 21   Hip flexor TPR + lateral hip:  [address], PTA   + lateral hip:  [address], PTA    Hip flexor PIR  TJH, PTA      RE     HJS, PT                   Exercise Diary         Therapeutic Exercise        Hip flexor stretch with PPT        Hip add stretch        Modified jeannette stretch 3x30 sec   B/L   3x30 sec   B/L    ITB stretch with strap  30 sec for demonstration                              Neuromuscular Re-education        Seated glut set        Alt glut set standing        Standing glut ladders  Wall sit NV?       Triple threats        elevated clams  Step ups with alt knee up on 6"+foam: 10x ea      X-walks  Leg press: 70#  Single :20# 15x ea      Wobble board balance with EC 2 mins ea 2 mins ea  2 mins ea EC: 2 mins ea   SLB  W/ball toss onto rebounder: red 20x ea  3x30 sec ea    Excursions 5x ea   B/L  5x ea    Mini squats on upside down bosu Rebounder        Heel clicks with lift 47R   NV 15x   Quadruped kick backs with alt UE 10x ea   With alt UE  10x ea 10x ea   Quadruped fire hydrants 10x ea   10x ea 10x ea   Stool scoots 5# 2x       Walking lunges    HOLD    Cup taps 14" box's stacked: 5x   8" box: 5x    Running man  On airex  10x ea      Hip hinge on airex 10# KB  10x2 on ea   10# KB  10x2 on ea    Heel taps 8" 10x2       Therapeutic Activities        Pt education  HEP discussed: add ITB stretch   HEP reveiwed  Progress discussed  All questions/concerns addressed              Modalities        CP PRN

## 2021-09-02 ENCOUNTER — APPOINTMENT (OUTPATIENT)
Dept: PHYSICAL THERAPY | Facility: CLINIC | Age: 46
End: 2021-09-02
Payer: COMMERCIAL

## 2021-09-13 ENCOUNTER — APPOINTMENT (OUTPATIENT)
Dept: PHYSICAL THERAPY | Facility: CLINIC | Age: 46
End: 2021-09-13
Payer: COMMERCIAL

## 2021-09-14 ENCOUNTER — OFFICE VISIT (OUTPATIENT)
Dept: PHYSICAL THERAPY | Facility: CLINIC | Age: 46
End: 2021-09-14
Payer: COMMERCIAL

## 2021-09-14 DIAGNOSIS — M25.552 PAIN IN LEFT HIP: Primary | ICD-10-CM

## 2021-09-14 PROCEDURE — 97112 NEUROMUSCULAR REEDUCATION: CPT

## 2021-09-14 PROCEDURE — 97140 MANUAL THERAPY 1/> REGIONS: CPT

## 2021-09-14 PROCEDURE — 97110 THERAPEUTIC EXERCISES: CPT

## 2021-09-14 NOTE — PROGRESS NOTES
Daily Note     Today's date: 2021  Patient name: Shobha Emanuel  : 1975  MRN: [id number]  Referring provider: Thao Arias DO  Dx:   Encounter Diagnosis     ICD-10-CM    1  Pain in left hip  M25 552                   Subjective: Pt reports that her hip is feeling better overall  She states that she feels some tightness in her hip at times but it is not as bad  Objective: See treatment diary below      Assessment: Tolerated treatment well  Progressed pt with strengthening ex's with muscle fatigue noted  No c/o increased knee pain  Continued tenderness is noted in R proximal hip flexor and lateral hip during manual therapy  Decreased discomfort reported after  Will continue to progress as tolerated  Plan: Continue per plan of care     Diagnosis: hip flexor strain left   Precautions: n/a   Goals: improve hip flexibility, improve hip strength  Manual Therapy 21   Hip flexor TPR + lateral hip:  [address], PTA  + lateral hip: [address], PTA     Hip flexor PIR  TJH, PTA      RE     HJS, PT                   Exercise Diary         Therapeutic Exercise        Hip flexor stretch with PPT        Hip add stretch        Modified jeannette stretch 3x30 sec   B/L  3x30 sec ea     ITB stretch with strap  30 sec for demonstration                              Neuromuscular Re-education        Seated glut set        Alt glut set standing        Standing glut ladders  Wall sit NV?  Wall sit: 3x20 sec     Triple threats        elevated clams  Step ups with alt knee up on 6"+foam: 10x ea Step ups with alt knee up on 6" + foam: 10x ea     X-walks  Leg press: 70#  Single :20# 15x ea Leg press: 70#  Single:20#       Wobble board balance with EC 2 mins ea 2 mins ea 2 mins ea  EC: 2 mins ea   SLB  W/ball toss onto rebounder: red 20x ea W/ball toss onto rebounder:  Red 20x ea     Excursions 5x ea  5x ea     Mini squats on upside down bosu        Rebounder        Heel clicks with lift 60G 15x  15x   Quadruped kick backs with alt UE 10x ea  10x ea  10x ea   Quadruped fire hydrants 10x ea    10x ea   Stool scoots 5# 2x  5# 2x     Walking lunges        Cup taps 14" box's stacked: 5x       Running man  On airex  10x ea NV     Hip hinge on airex 10# KB  10x2 on ea       Heel taps 8" 10x2       Therapeutic Activities        Pt education  HEP discussed: add ITB stretch   HEP reveiwed  Progress discussed  All questions/concerns addressed              Modalities        CP PRN

## 2021-09-16 ENCOUNTER — OFFICE VISIT (OUTPATIENT)
Dept: PHYSICAL THERAPY | Facility: CLINIC | Age: 46
End: 2021-09-16
Payer: COMMERCIAL

## 2021-09-16 DIAGNOSIS — M25.552 PAIN IN LEFT HIP: Primary | ICD-10-CM

## 2021-09-16 PROCEDURE — 97110 THERAPEUTIC EXERCISES: CPT

## 2021-09-16 PROCEDURE — 97112 NEUROMUSCULAR REEDUCATION: CPT

## 2021-09-16 PROCEDURE — 97140 MANUAL THERAPY 1/> REGIONS: CPT

## 2021-09-16 NOTE — PROGRESS NOTES
Daily Note     Today's date: 2021  Patient name: Kimberly Overton  : 1975  MRN: [id number]  Referring provider: Karen Higuera DO  Dx:   Encounter Diagnosis     ICD-10-CM    1  Pain in left hip  M25 552                   Subjective: Pt reports that she still feels the pain at times and feels it a little bit this morning just standing  Objective: See treatment diary below      Assessment: Tolerated treatment well  Progressed pt utilizing airex for SL hip strengthening with pt being challenged but no c/o increased pain sx's  SI joint assessed and treated by PT with pt reporting relief of L hip sx's after  Tenderness was still noted with TPR with decreased pain after  Pt will benefit from continued therapy  Plan: Continue per plan of care     Diagnosis: hip flexor strain left   Precautions: n/a   Goals: improve hip flexibility, improve hip strength  Manual Therapy 21    Hip flexor TPR + lateral hip:  [address], PTA  + lateral hip: [address], PTA + lateral hip:  [address], PTA    Hip flexor PIR  TJH, PTA      RE        Assess SI w/LE distraction    HJS, PT            Exercise Diary         Therapeutic Exercise        Hip flexor stretch with PPT        Hip add stretch        Modified jeannette stretch 3x30 sec   B/L  3x30 sec ea 3x30 sec ea    ITB stretch with strap  30 sec for demonstration                              Neuromuscular Re-education        Seated glut set        Alt glut set standing        Standing glut ladders  Wall sit NV?  Wall sit: 3x20 sec Wall sit: 3x30 sec    Triple threats        elevated clams  Step ups with alt knee up on 6"+foam: 10x ea Step ups with alt knee up on 6" + foam: 10x ea     X-walks  Leg press: 70#  Single :20# 15x ea Leg press: 70#  Single:20#   Leg press: 70#  Single: 20#  20x ea    Wobble board balance with EC 2 mins ea 2 mins ea 2 mins ea 2 mins ea    SLB  W/ball toss onto rebounder: red 20x ea W/ball toss onto rebounder:  Red 20x ea On airex w/ ball toss onto rebounder  Red 20x ea    Excursions 5x ea  5x ea On airex: 5x ea    Mini squats on upside down bosu        Rebounder        Heel clicks with lift 57Y  15x 15x    Quadruped kick backs with alt UE 10x ea  10x ea     Quadruped fire hydrants 10x ea       Stool scoots 5# 2x  5# 2x     Walking lunges        Cup taps 14" box's stacked: 5x       Running man  On airex  10x ea NV 10x2 ea    Hip hinge on airex 10# KB  10x2 on ea   10# KB  10x2 on ea    Heel taps 8" 10x2       Therapeutic Activities        Pt education  HEP discussed: add ITB stretch  HEP discussed            Modalities        CP PRN

## 2021-09-20 ENCOUNTER — OFFICE VISIT (OUTPATIENT)
Dept: PHYSICAL THERAPY | Facility: CLINIC | Age: 46
End: 2021-09-20
Payer: COMMERCIAL

## 2021-09-20 DIAGNOSIS — M25.552 PAIN IN LEFT HIP: Primary | ICD-10-CM

## 2021-09-20 PROCEDURE — 97110 THERAPEUTIC EXERCISES: CPT | Performed by: PHYSICAL THERAPIST

## 2021-09-20 PROCEDURE — 97112 NEUROMUSCULAR REEDUCATION: CPT | Performed by: PHYSICAL THERAPIST

## 2021-09-20 PROCEDURE — 97140 MANUAL THERAPY 1/> REGIONS: CPT | Performed by: PHYSICAL THERAPIST

## 2021-09-20 NOTE — PROGRESS NOTES
Daily Note     Today's date: 2021  Patient name: Christine Arias  : 1975  MRN: [id number]  Referring provider: Jess Madrid DO  Dx:   Encounter Diagnosis     ICD-10-CM    1  Pain in left hip  M25 552                   Subjective: Patient reports that she is sore today  She reports that she went for a run this weekend  She reports that she has been a little more sore since last visit  She is overall feeling pretty good today  Objective: See treatment diary below      Assessment: Tolerated treatment well  Patient demonstrated fatigue post treatment and exhibited good technique with therapeutic exercises  Patient was able to perform all exercises today without increased pain  She is improving slowly toward long term goals  Plan: Continue per plan of care  Diagnosis: hip flexor strain left   Precautions: n/a   Goals: improve hip flexibility, improve hip strength  Manual Therapy 21   Hip flexor TPR + lateral hip:  [address], PTA  + lateral hip: [address], PTA + lateral hip:  [facility], PTA    Hip flexor PIR  [facility], PTA      RE        Assess SI w/LE distraction    HJS, PT HJS, PT           Exercise Diary         Therapeutic Exercise        Hip flexor stretch with PPT        Hip add stretch        Modified jeannette stretch 3x30 sec   B/L  3x30 sec ea 3x30 sec ea Pulling opp leg to chest: [id number]   ITB stretch with strap  30 sec for demonstration   Modified pigeon pose stretch:  [id number] ea                           Neuromuscular Re-education        Seated glut set        Alt glut set standing        Standing glut ladders  Wall sit NV?  Wall sit: 3x20 sec Wall sit: 3x30 sec [id number]   Triple threats        elevated clams  Step ups with alt knee up on 6"+foam: 10x ea Step ups with alt knee up on 6" + foam: 10x ea     X-walks  Leg press: 70#  Single :20# 15x ea Leg press: 70#  Single:20#   Leg press: 70#  Single: 20#  20x ea Leg press: 70#  Single: 20#  20x ea   Wobble board balance with EC 2 mins ea 2 mins ea 2 mins ea 2 mins ea 2 mins ea   SLB  W/ball toss onto rebounder: red 20x ea W/ball toss onto rebounder:  Red 20x ea On airex w/ ball toss onto rebounder  Red 20x ea On airex w/ ball toss onto rebounder  Red 20x ea   Excursions 5x ea  5x ea On airex: 5x ea    Mini squats on upside down bosu        Rebounder        Heel clicks with lift 54O  15x 15x 15x   Quadruped kick backs with alt UE 10x ea  10x ea     Quadruped fire hydrants 10x ea       Stool scoots 5# 2x  5# 2x  5# 2 laps   Walking lunges        Cup taps 14" box's stacked: 5x       Running man  On airex  10x ea NV 10x2 ea 2x10 ea   Hip hinge on airex 10# KB  10x2 on ea   10# KB  10x2 on ea 10# KB 2x10 ea   Heel taps 8" 10x2       Therapeutic Activities        Pt education  HEP discussed: add ITB stretch  HEP discussed HEP reviewed  POC discussed             Modalities        CP PRN

## 2021-09-23 ENCOUNTER — EVALUATION (OUTPATIENT)
Dept: PHYSICAL THERAPY | Facility: CLINIC | Age: 46
End: 2021-09-23
Payer: COMMERCIAL

## 2021-09-23 DIAGNOSIS — M25.552 PAIN IN LEFT HIP: Primary | ICD-10-CM

## 2021-09-23 PROCEDURE — 97110 THERAPEUTIC EXERCISES: CPT | Performed by: PHYSICAL THERAPIST

## 2021-09-23 PROCEDURE — 97112 NEUROMUSCULAR REEDUCATION: CPT | Performed by: PHYSICAL THERAPIST

## 2021-09-23 PROCEDURE — 97140 MANUAL THERAPY 1/> REGIONS: CPT | Performed by: PHYSICAL THERAPIST

## 2021-09-23 NOTE — PROGRESS NOTES
PT RE-Evaluation    Today's date: 21   Patient name: Yaron Mg   : 1975   MRN: [id number]   Referring provider: Simeon Chaves DO   Dx:  Pain in left hip        Assessment   Lilian Nettles has been compliant with attending PT and home exercise program since initial eval   Tommie Mitchell  has made improvements in objective data since initial eval but continues to have limitations compared to prior level of function  Tommie Mitchell continues to have deficits in the above listed impairments and would benefit from additional skilled PT to address these deficits to return to prior level of function  Patient is going to attend PT for another 2-4 weeks and progress to an HEP  Impairments: abnormal coordination, abnormal gait, abnormal muscle firing, abnormal muscle tone, abnormal or restricted ROM, abnormal movement, activity intolerance, impaired balance, impaired physical strength, lacks appropriate home exercise program, pain with function and weight-bearing intolerance  Barriers to therapy: [id number] Societal implications    Understanding of Dx/Px/POC: good   Prognosis: good    Goals   Impairment Goals 4-6 weeks  - Decrease pain to <3/10 -Progressing  - Improve hip AROM to equal to the unaffected lower extremity -Progressing  - Increase knee strength to 5/5 throughout -Progressing  - Increase hip strength to 4/5 throughout -Progressing    Functional Goals 6-8 weeks  - Return to Prior Level of Function -Progressing  - Increase Functional Status Measure (FOTO) to: 75 -Progressing  - Patient will be independent with HEP -Progressing  - Patient will be able to sit without increased pain/compensation/difficulty -Progressing  - Patient will be able to walk without increased pain/compensation/difficulty -MET  - Patient will be able to bike without increased pain/compensation/difficulty -No change  - Patient will be able to participate in intercourse with her  with minimal pain -Progressing    Plan   Patient would benefit from: skilled PT  Referral necessary: No  Planned modality interventions: cryotherapy, electrical stimulation/Russian stimulation, H-Wave, TENS, thermotherapy: hydrocollator packs and unattended electrical stimulation  Planned therapy interventions: abdominal trunk stabilization, activity modification, ADL retraining, balance/weight bearing training, breathing training, body mechanics training, coordination, flexibility, functional ROM exercises, graded exercise, home exercise program, work reintegration, therapeutic training, therapeutic exercise, therapeutic activities, stretching, strengthening, self care, postural training, patient education, neuromuscular re-education, muscle pump exercises, motor coordination training, Jarrett taping, manual therapy, joint mobilization, IADL retraining, balance and gait training  Frequency: 2x week  Duration in visits: 8  Duration in weeks: 4  Treatment plan discussed with: patient, PTA and referring physician      Subjective Evaluation   Pain   Current pain ratin/10  At best pain ratin  At worst pain ratin/10  Location: left groin pain  Patient Goals   Patient goal: Patient reports that she wants to feel better, to strengthen the hip, and not have pain with intimacy  -Progressing       Patient reports that she feels about 75-80% improved since starting PT  She reports that she has been able to sleep better  HOBBIES/EXERCISE: She runs 3-4 days a week (30-40 mins, 3-4 miles), ON the weekends will run in the woods for 3-5 miles  hiking  Yoga  Stand up paddle boards,  Has been avoiding: rides on her bike, Skiing, ice skate,     PAIN LOCATION/DESCRIPTORS: Patient reports that she has pain in the left groin  Feels like her underwear is too tight occasionally, pinching feeling, will radiate above and below  Feels like her hip is stuck Up in the joint socket  Intermittent, varies, deep  NO longer notes pain in her ankle         AGGRAVATING FACTORS: Patient reports that the following have improved, but still needs work: Sitting for prolonged periods of time  Sleeping, being still  Running will start out stiff, but will loosen up  She notes that she will have pain with intimacy with her   Being on her hands and knees is more painful  She notes that if her bowels are more full, or she is gassy, her pain will increase  Avoids: Biking hurts the whole time  Notes no difficulty at all with: Stepping up  Objective   Tenderness     Additional Tenderness Details  Iliacus and psoas 1/4 TTP   Active Range of Motion   Lumbar   Normal active range of motion   Passive Range of Motion   Left Hip   Flexion: 130 degrees   Extension: Left hip passive extension: 0  Abduction: 44 degrees   External rotation (90/90): 70 degrees   Internal rotation (90/90): 70 degrees   Right Hip   Flexion: 120 degrees   Extension: 0 degrees   Abduction: 40 degrees   External rotation (90/90): 60 degrees   Internal rotation (90/90): 40 degrees     Strength/Myotome Testing   Left Hip   Planes of Motion   Flexion: 4  Extension: 4+  Abduction: 4  Right Hip   Planes of Motion   Flexion: 4+  Extension: 4+  Abduction: 4     Tests   Left Hip   Negative scour, JOMAR and FADIR  General Comments:   Hip Comments   Increased pain with bending over: no pinch with bending forward or return to stand  Deep squat: no pain  SI joint alignment:     PSIS: elevated on right  ASIS: equal  Iliac crest: elevated on right    Diagnosis: hip flexor strain left   Precautions: n/a   Goals: improve hip flexibility, improve hip strength       Manual Therapy 9/23/21 9/14/21 9/16/21 9/20/21   Hip flexor TPR   + lateral hip: [address], PTA + lateral hip:  [facility], PTA    Hip flexor PIR        RE        Assess SI w/LE distraction HJS, PT   HJS, PT HJS, PT           Exercise Diary         Therapeutic Exercise        Hip flexor stretch with PPT        Hip add stretch        Modified jeannette stretch, pulling opp leg to chest [id number]  3x30 sec ea 3x30 sec ea Pulling opp leg to chest: [id number]   Modified pigeon pose stretch [id number] ea    Modified pigeon pose stretch:  [id number] ea                           Neuromuscular Re-education        Seated glut set        Alt glut set standing        Standing glut ladders   Wall sit: 3x20 sec Wall sit: 3x30 sec [id number]   Triple threats        elevated clams   Step ups with alt knee up on 6" + foam: 10x ea     Leg Press 70#  Single: 20#  20x ea  Leg press: 70#  Single:20#   Leg press: 70#  Single: 20#  20x ea Leg press: 70#  Single: 20#  20x ea   Wobble board balance with EC   2 mins ea 2 mins ea 2 mins ea   SLB with ball toss onto rebounder Red 20x ea  W/ball toss onto rebounder:  Red 20x ea On airex w/ ball toss onto rebounder  Red 20x ea On airex w/ ball toss onto rebounder  Red 20x ea   Excursions   5x ea On airex: 5x ea    Mini squats on upside down bosu        Rebounder        Heel clicks with lift 06V  15x 15x 15x   Quadruped kick backs with alt UE   10x ea     Quadruped fire hydrants        Stool scoots 5# 2 laps  5# 2x  5# 2 laps   Walking lunges        Cup taps        Running man  On airex   NV 10x2 ea 2x10 ea   Hip hinge on airex    10# KB  10x2 on ea 10# KB 2x10 ea   Heel taps        Therapeutic Activities        Pt education Scheduling  POC  HEP discussed HEP reviewed  POC discussed             Modalities        CP PRN

## 2021-09-24 ENCOUNTER — TELEPHONE (OUTPATIENT)
Dept: GASTROENTEROLOGY | Facility: MEDICAL CENTER | Age: 46
End: 2021-09-24

## 2021-09-27 ENCOUNTER — OFFICE VISIT (OUTPATIENT)
Dept: PHYSICAL THERAPY | Facility: CLINIC | Age: 46
End: 2021-09-27
Payer: COMMERCIAL

## 2021-09-27 DIAGNOSIS — M25.552 PAIN IN LEFT HIP: Primary | ICD-10-CM

## 2021-09-27 PROCEDURE — 97110 THERAPEUTIC EXERCISES: CPT | Performed by: PHYSICAL THERAPIST

## 2021-09-27 PROCEDURE — 97530 THERAPEUTIC ACTIVITIES: CPT | Performed by: PHYSICAL THERAPIST

## 2021-09-27 PROCEDURE — 97112 NEUROMUSCULAR REEDUCATION: CPT | Performed by: PHYSICAL THERAPIST

## 2021-09-27 NOTE — PROGRESS NOTES
Daily Note     Today's date: 2021  Patient name: Stewart Gordon  : 1975  MRN: [id number]  Referring provider: Sharon Thakur DO  Dx:   Encounter Diagnosis     ICD-10-CM    1  Pain in left hip  M25 552                   Subjective: Patient states that she is a little sore today secondary to having slept awkwardly  She states that she is really feeling so much better though  Objective: See treatment diary below  All bony landmarks equal and symmetrical today  Assessment: Tolerated treatment well  Patient exhibited good technique with therapeutic exercises  Patient was able to perform all exercises today without increased pain  She is improving slowly toward long term goals  All bony landmarks were equal and symmetrical today  Manual therapy held  Plan: Continue per plan of care  Diagnosis: hip flexor strain left   Precautions: n/a   Goals: improve hip flexibility, improve hip strength       Manual Therapy 21   Hip flexor TPR   + lateral hip: [address], PTA + lateral hip:  [address], PTA    Hip flexor PIR        RE        Assess SI w/LE distraction hold   HJS, PT HJS, PT           Exercise Diary         Therapeutic Exercise        Hip flexor stretch with PPT        Hip add stretch        Modified jeannette stretch- pulling opp leg to chest [id number]  3x30 sec ea 3x30 sec ea Pulling opp leg to chest: [id number]   Modified pigeon pose stretch: [id number] ea    Modified pigeon pose stretch:  [id number] ea                           Neuromuscular Re-education        Seated glut set        Alt glut set standing        Wall sit [id number]  Wall sit: 3x20 sec Wall sit: 3x30 sec [id number]   Triple threats        elevated clams   Step ups with alt knee up on 6" + foam: 10x ea     Leg Press 70# double  30# single  20x   Leg press: 70#  Single:20#   Leg press: 70#  Single: 20#  20x ea Leg press: 70#  Single: 20#  20x ea   Wobble board balance with EC 2 mins ea  2 mins ea 2 mins ea 2 mins ea   SLB on airex with ball toss onto rebounder Red 20x ea  W/ball toss onto rebounder:  Red 20x ea On airex w/ ball toss onto rebounder  Red 20x ea On airex w/ ball toss onto rebounder  Red 20x ea   Excursions on airex 5x ea  5x ea On airex: 5x ea    Mini squats on upside down bosu                Heel clicks with lift 23K  15x 15x 15x   Quadruped kick backs with alt UE   10x ea     Quadruped fire hydrants        Stool scoots 5# 2 laps  5# 2x  5# 2 laps   Walking lunges        Cup taps        Running man  On airex 2x10 ea  NV 10x2 ea 2x10 ea   Hip hinge on airex 10# 2x10 ea   10# KB  10x2 on ea 10# KB 2x10 ea   Tap downs 8 in 20x        Therapeutic Activities        Pt education HEP reviewed  All questions/concerns addressed  HEP discussed HEP reviewed  POC discussed             Modalities        CP PRN

## 2021-09-30 ENCOUNTER — OFFICE VISIT (OUTPATIENT)
Dept: PHYSICAL THERAPY | Facility: CLINIC | Age: 46
End: 2021-09-30
Payer: COMMERCIAL

## 2021-09-30 DIAGNOSIS — M25.552 PAIN IN LEFT HIP: Primary | ICD-10-CM

## 2021-09-30 PROCEDURE — 97110 THERAPEUTIC EXERCISES: CPT

## 2021-09-30 PROCEDURE — 97112 NEUROMUSCULAR REEDUCATION: CPT

## 2021-09-30 NOTE — PROGRESS NOTES
Daily Note     Today's date: 2021  Patient name: Keya Gerard  : 1975  MRN: [id number]  Referring provider: Viviana Wilburn DO  Dx:   Encounter Diagnosis     ICD-10-CM    1  Pain in left hip  M25 552                   Subjective: Pt reports that her L hip flexor has been tight recently when she gets up in the morning  She reports that she has been rolling and stretching and it does help feel better  Objective: See treatment diary below      Assessment: Tolerated treatment well  Pt able to perform all ex's with no c/o increased pain  Added cup taps back in with cues needed for knee valgus when fatigued  Held on manual therapy to assess exercise only  Pt progressing slowly towards her goals  Plan: Continue per plan of care     Diagnosis: hip flexor strain left   Precautions: n/a   Goals: improve hip flexibility, improve hip strength       Manual Therapy 21   Hip flexor TPR    + lateral hip:  [address], PTA    Hip flexor PIR        RE        Assess SI w/LE distraction hold   HJS, PT HJS, PT           Exercise Diary         Therapeutic Exercise        Hip flexor stretch with PPT        Hip add stretch        Modified jeannette stretch- pulling opp leg to chest [id number] 3x30 sec  3x30 sec ea Pulling opp leg to chest: [id number]   Modified pigeon pose stretch: [id number] ea 3x30 sec ea   Modified pigeon pose stretch:  [id number] ea                           Neuromuscular Re-education        Seated glut set        Alt glut set standing        Wall sit [id number] 3x30 sec  Wall sit: 3x30 sec [id number]   Triple threats        elevated clams        Leg Press 70# double  30# single  20x  75# double  30# single  20x  Leg press: 70#  Single: 20#  20x ea Leg press: 70#  Single: 20#  20x ea   Wobble board balance with EC 2 mins ea 2 mins ea  2 mins ea 2 mins ea   SLB on airex with ball toss onto rebounder Red 20x ea Red 20x ea  On airex w/ ball toss onto rebounder  Red 20x ea On airex w/ ball toss onto rebounder  Red 20x ea   Excursions on airex 5x ea 5x ea  On airex: 5x ea    Mini squats on upside down bosu                Heel clicks with lift 74B   15x 15x   Quadruped kick backs with alt UE        Quadruped fire hydrants        Stool scoots 5# 2 laps 5# 2 laps   5# 2 laps   Walking lunges        Cup taps  On 12" box: 5x ea      Running man  On airex 2x10 ea 2x10 ea  10x2 ea 2x10 ea   Hip hinge on airex 10# 2x10 ea 10# 2x10 ea  10# KB  10x2 on ea 10# KB 2x10 ea   Tap downs 8 in 20x        Therapeutic Activities        Pt education HEP reviewed  All questions/concerns addressed  HEP discussed HEP reviewed  POC discussed             Modalities        CP PRN

## 2021-10-01 ENCOUNTER — TELEPHONE (OUTPATIENT)
Dept: GASTROENTEROLOGY | Facility: MEDICAL CENTER | Age: 46
End: 2021-10-01

## 2021-10-04 ENCOUNTER — ANESTHESIA EVENT (OUTPATIENT)
Dept: GASTROENTEROLOGY | Facility: MEDICAL CENTER | Age: 46
End: 2021-10-04

## 2021-10-04 ENCOUNTER — ANESTHESIA (OUTPATIENT)
Dept: GASTROENTEROLOGY | Facility: MEDICAL CENTER | Age: 46
End: 2021-10-04

## 2021-10-04 ENCOUNTER — HOSPITAL ENCOUNTER (OUTPATIENT)
Dept: GASTROENTEROLOGY | Facility: MEDICAL CENTER | Age: 46
Setting detail: OUTPATIENT SURGERY
Discharge: HOME | End: 2021-10-04
Payer: COMMERCIAL

## 2021-10-04 VITALS
BODY MASS INDEX: 18.88 KG/M2 | RESPIRATION RATE: 20 BRPM | TEMPERATURE: 98.7 F | DIASTOLIC BLOOD PRESSURE: 59 MMHG | WEIGHT: 100 LBS | OXYGEN SATURATION: 99 % | HEART RATE: 60 BPM | SYSTOLIC BLOOD PRESSURE: 102 MMHG | HEIGHT: 61 IN

## 2021-10-04 DIAGNOSIS — R14.0 BLOATING: ICD-10-CM

## 2021-10-04 LAB
EXT PREGNANCY TEST URINE: NEGATIVE
EXT. CONTROL: NORMAL

## 2021-10-04 PROCEDURE — 45380 COLONOSCOPY AND BIOPSY: CPT | Performed by: INTERNAL MEDICINE

## 2021-10-04 PROCEDURE — 81025 URINE PREGNANCY TEST: CPT | Performed by: ANESTHESIOLOGY

## 2021-10-04 PROCEDURE — 88305 TISSUE EXAM BY PATHOLOGIST: CPT | Performed by: PATHOLOGY

## 2021-10-04 RX ORDER — PROPOFOL 10 MG/ML
INJECTION, EMULSION INTRAVENOUS AS NEEDED
Status: DISCONTINUED | OUTPATIENT
Start: 2021-10-04 | End: 2021-10-04

## 2021-10-04 RX ORDER — SODIUM CHLORIDE 9 MG/ML
125 INJECTION, SOLUTION INTRAVENOUS CONTINUOUS
Status: DISCONTINUED | OUTPATIENT
Start: 2021-10-04 | End: 2021-10-08 | Stop reason: HOSPADM

## 2021-10-04 RX ADMIN — PROPOFOL 50 MG: 10 INJECTION, EMULSION INTRAVENOUS at 07:35

## 2021-10-04 RX ADMIN — PROPOFOL 100 MG: 10 INJECTION, EMULSION INTRAVENOUS at 07:27

## 2021-10-04 RX ADMIN — PROPOFOL 50 MG: 10 INJECTION, EMULSION INTRAVENOUS at 07:40

## 2021-10-04 RX ADMIN — PROPOFOL 50 MG: 10 INJECTION, EMULSION INTRAVENOUS at 07:29

## 2021-10-04 RX ADMIN — PROPOFOL 50 MG: 10 INJECTION, EMULSION INTRAVENOUS at 07:31

## 2021-10-04 RX ADMIN — PROPOFOL 50 MG: 10 INJECTION, EMULSION INTRAVENOUS at 07:32

## 2021-10-04 RX ADMIN — SODIUM CHLORIDE 125 ML/HR: 0.9 INJECTION, SOLUTION INTRAVENOUS at 07:08

## 2021-10-05 ENCOUNTER — OFFICE VISIT (OUTPATIENT)
Dept: PHYSICAL THERAPY | Facility: CLINIC | Age: 46
End: 2021-10-05
Payer: COMMERCIAL

## 2021-10-05 DIAGNOSIS — M25.552 PAIN IN LEFT HIP: Primary | ICD-10-CM

## 2021-10-05 PROCEDURE — 97112 NEUROMUSCULAR REEDUCATION: CPT

## 2021-10-05 PROCEDURE — 97140 MANUAL THERAPY 1/> REGIONS: CPT

## 2021-10-07 ENCOUNTER — OFFICE VISIT (OUTPATIENT)
Dept: PHYSICAL THERAPY | Facility: CLINIC | Age: 46
End: 2021-10-07
Payer: COMMERCIAL

## 2021-10-07 DIAGNOSIS — M25.552 PAIN IN LEFT HIP: Primary | ICD-10-CM

## 2021-10-07 PROCEDURE — 97112 NEUROMUSCULAR REEDUCATION: CPT

## 2021-10-07 PROCEDURE — 97110 THERAPEUTIC EXERCISES: CPT

## 2021-10-11 ENCOUNTER — OFFICE VISIT (OUTPATIENT)
Dept: PHYSICAL THERAPY | Facility: CLINIC | Age: 46
End: 2021-10-11
Payer: COMMERCIAL

## 2021-10-11 DIAGNOSIS — M25.552 PAIN IN LEFT HIP: Primary | ICD-10-CM

## 2021-10-11 PROCEDURE — 97112 NEUROMUSCULAR REEDUCATION: CPT

## 2021-10-11 PROCEDURE — 97110 THERAPEUTIC EXERCISES: CPT

## 2021-10-14 ENCOUNTER — OFFICE VISIT (OUTPATIENT)
Dept: PHYSICAL THERAPY | Facility: CLINIC | Age: 46
End: 2021-10-14
Payer: COMMERCIAL

## 2021-10-14 DIAGNOSIS — M25.552 PAIN IN LEFT HIP: Primary | ICD-10-CM

## 2021-10-14 PROCEDURE — 97112 NEUROMUSCULAR REEDUCATION: CPT

## 2021-10-14 PROCEDURE — 97110 THERAPEUTIC EXERCISES: CPT

## 2021-10-18 ENCOUNTER — OFFICE VISIT (OUTPATIENT)
Dept: PHYSICAL THERAPY | Facility: CLINIC | Age: 46
End: 2021-10-18
Payer: COMMERCIAL

## 2021-10-18 DIAGNOSIS — M25.552 PAIN IN LEFT HIP: Primary | ICD-10-CM

## 2021-10-18 PROCEDURE — 97112 NEUROMUSCULAR REEDUCATION: CPT

## 2021-10-18 PROCEDURE — 97110 THERAPEUTIC EXERCISES: CPT

## 2021-10-21 ENCOUNTER — HOSPITAL ENCOUNTER (OUTPATIENT)
Dept: RADIOLOGY | Age: 46
Discharge: HOME | End: 2021-10-21
Attending: OBSTETRICS & GYNECOLOGY
Payer: COMMERCIAL

## 2021-10-21 ENCOUNTER — APPOINTMENT (OUTPATIENT)
Dept: PHYSICAL THERAPY | Facility: CLINIC | Age: 46
End: 2021-10-21
Payer: COMMERCIAL

## 2021-10-21 VITALS — BODY MASS INDEX: 20.03 KG/M2 | WEIGHT: 102 LBS | HEIGHT: 60 IN

## 2021-10-21 DIAGNOSIS — Z12.31 BREAST CANCER SCREENING BY MAMMOGRAM: ICD-10-CM

## 2021-10-21 PROCEDURE — 77063 BREAST TOMOSYNTHESIS BI: CPT

## 2021-10-21 PROCEDURE — 77067 SCR MAMMO BI INCL CAD: CPT

## 2021-10-25 ENCOUNTER — EVALUATION (OUTPATIENT)
Dept: PHYSICAL THERAPY | Facility: CLINIC | Age: 46
End: 2021-10-25
Payer: COMMERCIAL

## 2021-10-25 DIAGNOSIS — M25.552 PAIN IN LEFT HIP: Primary | ICD-10-CM

## 2021-10-25 PROCEDURE — 97530 THERAPEUTIC ACTIVITIES: CPT | Performed by: PHYSICAL THERAPIST

## 2021-10-25 PROCEDURE — 97140 MANUAL THERAPY 1/> REGIONS: CPT | Performed by: PHYSICAL THERAPIST

## 2021-10-28 ENCOUNTER — APPOINTMENT (OUTPATIENT)
Dept: PHYSICAL THERAPY | Facility: CLINIC | Age: 46
End: 2021-10-28
Payer: COMMERCIAL

## 2021-11-01 ENCOUNTER — HOSPITAL ENCOUNTER (OUTPATIENT)
Dept: ULTRASOUND IMAGING | Facility: CLINIC | Age: 46
Discharge: HOME | End: 2021-11-01
Payer: COMMERCIAL

## 2021-11-01 DIAGNOSIS — R92.8 ABNORMAL MAMMOGRAM: ICD-10-CM

## 2021-11-01 PROCEDURE — 76642 ULTRASOUND BREAST LIMITED: CPT

## 2021-12-12 DIAGNOSIS — G43.009 MIGRAINE WITHOUT AURA AND WITHOUT STATUS MIGRAINOSUS, NOT INTRACTABLE: ICD-10-CM

## 2021-12-13 RX ORDER — SUMATRIPTAN 25 MG/1
TABLET, FILM COATED ORAL
Qty: 9 TABLET | Refills: 3 | Status: SHIPPED | OUTPATIENT
Start: 2021-12-13 | End: 2022-04-22

## 2022-01-17 ENCOUNTER — TELEPHONE (OUTPATIENT)
Dept: NEUROLOGY | Facility: CLINIC | Age: 47
End: 2022-01-17

## 2022-01-17 NOTE — TELEPHONE ENCOUNTER
[facility] for patient we need to reschedule perla 3/7 (Marty Golden) left direct # till 4pm today, central # for later

## 2022-03-03 ENCOUNTER — TELEPHONE (OUTPATIENT)
Dept: NEUROLOGY | Facility: CLINIC | Age: 47
End: 2022-03-03

## 2022-04-22 DIAGNOSIS — G43.009 MIGRAINE WITHOUT AURA AND WITHOUT STATUS MIGRAINOSUS, NOT INTRACTABLE: ICD-10-CM

## 2022-04-22 RX ORDER — SUMATRIPTAN 25 MG/1
TABLET, FILM COATED ORAL
Qty: 9 TABLET | Refills: 3 | Status: SHIPPED | OUTPATIENT
Start: 2022-04-22

## 2022-05-02 ENCOUNTER — HOSPITAL ENCOUNTER (OUTPATIENT)
Dept: ULTRASOUND IMAGING | Facility: CLINIC | Age: 47
Discharge: HOME | End: 2022-05-02
Payer: COMMERCIAL

## 2022-05-02 DIAGNOSIS — R92.8 OTHER ABNORMAL AND INCONCLUSIVE FINDINGS ON DIAGNOSTIC IMAGING OF BREAST: ICD-10-CM

## 2022-05-02 PROCEDURE — 76642 ULTRASOUND BREAST LIMITED: CPT

## 2022-05-11 ENCOUNTER — TELEPHONE (OUTPATIENT)
Dept: FAMILY MEDICINE CLINIC | Facility: CLINIC | Age: 47
End: 2022-05-11

## 2022-05-11 DIAGNOSIS — E03.9 HYPOTHYROIDISM, UNSPECIFIED TYPE: ICD-10-CM

## 2022-05-11 DIAGNOSIS — E78.5 DYSLIPIDEMIA: Primary | ICD-10-CM

## 2022-05-11 RX ORDER — LEVOTHYROXINE SODIUM 0.07 MG/1
TABLET ORAL
Qty: 90 TABLET | Refills: 0 | Status: SHIPPED | OUTPATIENT
Start: 2022-05-11 | End: 2022-06-17 | Stop reason: SDUPTHER

## 2022-05-12 NOTE — TELEPHONE ENCOUNTER
Please contact patient  She is due for annual blood work in physical   Blood work orders placed    Thank you

## 2022-06-13 ENCOUNTER — APPOINTMENT (OUTPATIENT)
Dept: LAB | Facility: CLINIC | Age: 47
End: 2022-06-13
Payer: COMMERCIAL

## 2022-06-13 DIAGNOSIS — E78.5 DYSLIPIDEMIA: ICD-10-CM

## 2022-06-13 DIAGNOSIS — E03.9 HYPOTHYROIDISM, UNSPECIFIED TYPE: ICD-10-CM

## 2022-06-13 LAB
ALBUMIN SERPL BCP-MCNC: 3.5 G/DL (ref 3.5–5)
ALP SERPL-CCNC: 48 U/L (ref 46–116)
ALT SERPL W P-5'-P-CCNC: 18 U/L (ref 12–78)
ANION GAP SERPL CALCULATED.3IONS-SCNC: 5 MMOL/L (ref 4–13)
AST SERPL W P-5'-P-CCNC: 13 U/L (ref 5–45)
BASOPHILS # BLD AUTO: 0.06 THOUSANDS/ΜL (ref 0–0.1)
BASOPHILS NFR BLD AUTO: 1 % (ref 0–1)
BILIRUB SERPL-MCNC: 0.43 MG/DL (ref 0.2–1)
BUN SERPL-MCNC: 10 MG/DL (ref 5–25)
CALCIUM SERPL-MCNC: 9.2 MG/DL (ref 8.3–10.1)
CHLORIDE SERPL-SCNC: 108 MMOL/L (ref 100–108)
CHOLEST SERPL-MCNC: 174 MG/DL
CO2 SERPL-SCNC: 25 MMOL/L (ref 21–32)
CREAT SERPL-MCNC: 0.72 MG/DL (ref 0.6–1.3)
EOSINOPHIL # BLD AUTO: 0.05 THOUSAND/ΜL (ref 0–0.61)
EOSINOPHIL NFR BLD AUTO: 1 % (ref 0–6)
ERYTHROCYTE [DISTWIDTH] IN BLOOD BY AUTOMATED COUNT: 12.4 % (ref 11.6–15.1)
GFR SERPL CREATININE-BSD FRML MDRD: 100 ML/MIN/1.73SQ M
GLUCOSE P FAST SERPL-MCNC: 86 MG/DL (ref 65–99)
HCT VFR BLD AUTO: 39.8 % (ref 34.8–46.1)
HDLC SERPL-MCNC: 59 MG/DL
HGB BLD-MCNC: 12.6 G/DL (ref 11.5–15.4)
IMM GRANULOCYTES # BLD AUTO: 0.01 THOUSAND/UL (ref 0–0.2)
IMM GRANULOCYTES NFR BLD AUTO: 0 % (ref 0–2)
LDLC SERPL CALC-MCNC: 100 MG/DL (ref 0–100)
LYMPHOCYTES # BLD AUTO: 2.04 THOUSANDS/ΜL (ref 0.6–4.47)
LYMPHOCYTES NFR BLD AUTO: 39 % (ref 14–44)
MCH RBC QN AUTO: 32 PG (ref 26.8–34.3)
MCHC RBC AUTO-ENTMCNC: 31.7 G/DL (ref 31.4–37.4)
MCV RBC AUTO: 101 FL (ref 82–98)
MONOCYTES # BLD AUTO: 0.43 THOUSAND/ΜL (ref 0.17–1.22)
MONOCYTES NFR BLD AUTO: 8 % (ref 4–12)
NEUTROPHILS # BLD AUTO: 2.59 THOUSANDS/ΜL (ref 1.85–7.62)
NEUTS SEG NFR BLD AUTO: 51 % (ref 43–75)
NRBC BLD AUTO-RTO: 0 /100 WBCS
PLATELET # BLD AUTO: 210 THOUSANDS/UL (ref 149–390)
PMV BLD AUTO: 12.8 FL (ref 8.9–12.7)
POTASSIUM SERPL-SCNC: 4 MMOL/L (ref 3.5–5.3)
PROT SERPL-MCNC: 6.9 G/DL (ref 6.4–8.2)
RBC # BLD AUTO: 3.94 MILLION/UL (ref 3.81–5.12)
SODIUM SERPL-SCNC: 138 MMOL/L (ref 136–145)
TRIGL SERPL-MCNC: 73 MG/DL
TSH SERPL DL<=0.05 MIU/L-ACNC: 3.06 UIU/ML (ref 0.45–4.5)
WBC # BLD AUTO: 5.18 THOUSAND/UL (ref 4.31–10.16)

## 2022-06-13 PROCEDURE — 80053 COMPREHEN METABOLIC PANEL: CPT

## 2022-06-13 PROCEDURE — 84443 ASSAY THYROID STIM HORMONE: CPT

## 2022-06-13 PROCEDURE — 80061 LIPID PANEL: CPT

## 2022-06-13 PROCEDURE — 85025 COMPLETE CBC W/AUTO DIFF WBC: CPT

## 2022-06-13 PROCEDURE — 36415 COLL VENOUS BLD VENIPUNCTURE: CPT

## 2022-06-14 RX ORDER — MELOXICAM 15 MG/1
15 TABLET ORAL DAILY PRN
COMMUNITY
Start: 2022-05-17 | End: 2022-06-17

## 2022-06-14 RX ORDER — EPINEPHRINE 0.3 MG/.3ML
INJECTION SUBCUTANEOUS
COMMUNITY
End: 2022-06-17

## 2022-06-14 RX ORDER — BETAMETHASONE SODIUM PHOSPHATE AND BETAMETHASONE ACETATE 3; 3 MG/ML; MG/ML
1 INJECTION, SUSPENSION INTRA-ARTICULAR; INTRALESIONAL; INTRAMUSCULAR; SOFT TISSUE
COMMUNITY
End: 2022-06-17 | Stop reason: ALTCHOICE

## 2022-06-14 RX ORDER — ESOMEPRAZOLE MAGNESIUM 40 MG/1
CAPSULE, DELAYED RELEASE ORAL
COMMUNITY
End: 2022-06-17 | Stop reason: ALTCHOICE

## 2022-06-14 RX ORDER — BUPIVACAINE HYDROCHLORIDE 2.5 MG/ML
1 INJECTION, SOLUTION INFILTRATION; PERINEURAL
COMMUNITY
End: 2022-06-17 | Stop reason: ALTCHOICE

## 2022-06-14 RX ORDER — DEXAMETHASONE SODIUM PHOSPHATE 4 MG/ML
1 INJECTION, SOLUTION INTRA-ARTICULAR; INTRALESIONAL; INTRAMUSCULAR; INTRAVENOUS; SOFT TISSUE
COMMUNITY
End: 2022-06-17 | Stop reason: ALTCHOICE

## 2022-06-14 RX ORDER — LIDOCAINE HYDROCHLORIDE 10 MG/ML
2 INJECTION, SOLUTION INFILTRATION; PERINEURAL
COMMUNITY
End: 2022-06-17 | Stop reason: ALTCHOICE

## 2022-06-14 RX ORDER — AMOXICILLIN 500 MG/1
CAPSULE ORAL
COMMUNITY
End: 2022-06-17 | Stop reason: ALTCHOICE

## 2022-06-17 ENCOUNTER — OFFICE VISIT (OUTPATIENT)
Dept: FAMILY MEDICINE CLINIC | Facility: CLINIC | Age: 47
End: 2022-06-17
Payer: COMMERCIAL

## 2022-06-17 VITALS
TEMPERATURE: 97.6 F | RESPIRATION RATE: 16 BRPM | HEART RATE: 68 BPM | WEIGHT: 99 LBS | HEIGHT: 61 IN | BODY MASS INDEX: 18.69 KG/M2 | DIASTOLIC BLOOD PRESSURE: 62 MMHG | OXYGEN SATURATION: 100 % | SYSTOLIC BLOOD PRESSURE: 90 MMHG

## 2022-06-17 DIAGNOSIS — Z00.00 ENCOUNTER FOR WELLNESS EXAMINATION IN ADULT: Primary | ICD-10-CM

## 2022-06-17 DIAGNOSIS — K21.9 GASTROESOPHAGEAL REFLUX DISEASE WITHOUT ESOPHAGITIS: Chronic | ICD-10-CM

## 2022-06-17 DIAGNOSIS — E03.9 HYPOTHYROIDISM, UNSPECIFIED TYPE: ICD-10-CM

## 2022-06-17 DIAGNOSIS — G43.809 HEADACHE, VARIANT MIGRAINE: ICD-10-CM

## 2022-06-17 DIAGNOSIS — F43.23 ADJUSTMENT REACTION WITH ANXIETY AND DEPRESSION: ICD-10-CM

## 2022-06-17 PROBLEM — R09.81 NASAL CONGESTION: Status: RESOLVED | Noted: 2021-02-02 | Resolved: 2022-06-17

## 2022-06-17 PROCEDURE — 3725F SCREEN DEPRESSION PERFORMED: CPT | Performed by: FAMILY MEDICINE

## 2022-06-17 PROCEDURE — 99396 PREV VISIT EST AGE 40-64: CPT | Performed by: FAMILY MEDICINE

## 2022-06-17 RX ORDER — LEVOTHYROXINE SODIUM 0.07 MG/1
75 TABLET ORAL DAILY
Qty: 90 TABLET | Refills: 3 | Status: SHIPPED | OUTPATIENT
Start: 2022-06-17

## 2022-06-17 NOTE — PROGRESS NOTES
FAMILY PRACTICE OFFICE VISIT       NAME: Aime Nettles  AGE: 55 y o  SEX: female       : 1975        MRN: [id number]        Assessment and Plan     1  Encounter for wellness examination in adult  Comments:  Up-to-date with colonoscopy and mammogram   Pending gyn follow-up  2  Hypothyroidism, unspecified type  Assessment & Plan:  Recent TSH is normal     Continue levothyroxine 75 mcg daily    Orders:  -     levothyroxine 75 mcg tablet; Take 1 tablet (75 mcg total) by mouth daily    3  Gastroesophageal reflux disease without esophagitis  Assessment & Plan:  Symptoms are well controlled on Pepcid      4  Adjustment reaction with anxiety and depression  Assessment & Plan:  Significant stress for over 2 years   over a year ago, moved, good family and friend support  Chronic symptoms of dysthymia and anxiety  Referral for counseling  Start Zoloft, patient will update me regarding her progress via my chart in 4 to 6 weeks  Orders:  -     sertraline (Zoloft) 50 mg tablet; Take half a tablet once a day after food for 6 days, then increase dose to 1 tablet daily after breakfast    5  Headache, variant migraine  Assessment & Plan:  Imitrex 75  Mg PRN  [facility] Neurology, Dr Davis- pending   Trial of Zoloft for anxiety/depression may favorably affect frequency and severity of chronic migraines                 There are no Patient Instructions on file for this visit  Return in about 1 year (around 2023) for Annual physical/well exam     Discussed with the patient and all questioned fully answered  She will call me if any problems arise  M*Modal software was used to dictate this note  It may contain errors with dictating incorrect words/spelling  Please contact provider directly with any questions         Chief Complaint     Chief Complaint   Patient presents with    Physical Exam     Annual well exam       History of Present Illness     Annual well exam   over a year ago, moved to [place], under lot of stress, coping with good support of family and friends  Enjoys hiking  Family history updates:  mother- TIA, hyperlipidemia  Results of recent blood work reviewed with patient  All essentially normal   Significantly improved cholesterol  Normal TSH- on levothyroxine 75 mcg daily      UTD with mammo   Dx US 5/2022- benign cyst    [place] for next screening mammo 10/22   UTD with colonoscopy  New GYN OV 6/2022 - Dr Russo    Emotional symptoms, intermittent depression and anxiety for over 2 years  Patient would like to pursue counseling and trial of medication  Occ  palpitations- anxiety stressed -only at rest- never with exercise  Sleeps better later  Lack of manuela and motivation  Crying at times   No suicidal or homicidal ideation  Patient remains under care of [facility] for surveillance of headaches  She uses Imitrex 75 mg p r n  Overall headaches have not been too bothersome  Review of Systems   Review of Systems   Constitutional: Negative  HENT: Positive for hearing loss (Chronic)  Eyes: Negative  Respiratory: Negative  Cardiovascular: Negative  Gastrointestinal: Negative  Endocrine: Negative  Musculoskeletal: Negative  Skin: Negative  Allergic/Immunologic: Negative  Neurological: Negative  Hematological: Negative  Psychiatric/Behavioral: Positive for dysphoric mood and sleep disturbance  The patient is nervous/anxious           As per HPI       Active Problem List     Patient Active Problem List   Diagnosis    Hypothyroidism    Abnormal MRI of head    Hearing loss    Headache, variant migraine    Nasal septal deviation    Gastroesophageal reflux disease    Bee allergy status    Colon polyp    History of sudden hearing loss    Strain of left groin    Dyslipidemia    Adjustment reaction with anxiety and depression       Past Medical History:  Past Medical History:   Diagnosis Date    Cluster headache  Colon polyp     Disease of thyroid gland     GERD (gastroesophageal reflux disease)        Past Surgical History:  Past Surgical History:   Procedure Laterality Date    ANKLE SURGERY Left 03/20    ANTERIOR CRUCIATE LIGAMENT REPAIR Right 2007    COLONOSCOPY      FL INJECTION LEFT HIP (ARTHROGRAM)  8/23/2021    HIP SURGERY Left 2014    nerve entrapment release    WRIST SURGERY Right 03/2009    Dr Osmar Carlson Right 02/07/2020       Family History:  Family History   Problem Relation Age of Onset    Hypothyroidism Mother     Migraines Mother     Hyperlipidemia Father         Pure    Stomach cancer Paternal Grandmother 80    Ovarian cancer Maternal Grandmother 48        over age 48   Fran Colby No Known Problems Sister     Lymphoma Maternal Grandfather 80    No Known Problems Paternal Grandfather     Prostate cancer Maternal Uncle     Prostate cancer Paternal Uncle        Social History:  Social History     Socioeconomic History    Marital status: /Civil Union     Spouse name: Not on file    Number of children: Not on file    Years of education: Not on file    Highest education level: Not on file   Occupational History    Not on file   Tobacco Use    Smoking status: Never Smoker    Smokeless tobacco: Never Used   Vaping Use    Vaping Use: Never used   Substance and Sexual Activity    Alcohol use: Yes     Comment: Social     Drug use: Yes     Comment: CBD gummies    Sexual activity: Not on file   Other Topics Concern    Not on file   Social History Narrative    Caffeine use     Social Determinants of Health     Financial Resource Strain: Not on file   Food Insecurity: Not on file   Transportation Needs: Not on file   Physical Activity: Not on file   Stress: Not on file   Social Connections: Not on file   Intimate Partner Violence: Not on file   Housing Stability: Not on file         Objective     Vitals:    06/17/22 0754   BP: 90/62   BP Location: Left arm   Patient Position: Sitting   Cuff Size: Standard   Pulse: 68   Resp: 16   Temp: 97 6 °F (36 4 °C)   TempSrc: Temporal   SpO2: 100%   Weight: 44 9 kg (99 lb)   Height: 5' 0 75" (1 543 m)       Wt Readings from Last 3 Encounters:   06/17/22 44 9 kg (99 lb)   10/21/21 46 3 kg (102 lb)   10/04/21 45 4 kg (100 lb)       Physical Exam  Vitals and nursing note reviewed  Constitutional:       General: She is not in acute distress  Appearance: Normal appearance  She is well-developed  She is not ill-appearing  HENT:      Head: Normocephalic and atraumatic  Eyes:      General: No scleral icterus  Conjunctiva/sclera: Conjunctivae normal    Neck:      Thyroid: No thyromegaly  Vascular: No carotid bruit  Cardiovascular:      Rate and Rhythm: Normal rate and regular rhythm  Heart sounds: Normal heart sounds  No murmur heard  Pulmonary:      Effort: Pulmonary effort is normal  No respiratory distress  Breath sounds: Normal breath sounds  No wheezing  Abdominal:      General: Bowel sounds are normal  There is no distension or abdominal bruit  Palpations: Abdomen is soft  Tenderness: There is no abdominal tenderness  Hernia: No hernia is present  Musculoskeletal:         General: Normal range of motion  Cervical back: Neck supple  No rigidity  Right lower leg: No edema  Left lower leg: No edema  Skin:     General: Skin is warm  Neurological:      General: No focal deficit present  Mental Status: She is alert and oriented to person, place, and time  Cranial Nerves: No cranial nerve deficit  Coordination: Coordination normal    Psychiatric:         Mood and Affect: Mood normal          Behavior: Behavior normal          Thought Content:  Thought content normal           Pertinent Laboratory/Diagnostic Studies:    Lab Results   Component Value Date    WBC 5 18 06/13/2022    HGB 12 6 06/13/2022    HCT 39 8 06/13/2022     (H) 06/13/2022     06/13/2022 Lab Results   Component Value Date    TSH 3 83 05/05/2021       Lab Results   Component Value Date    CHOL 200 10/19/2016     Lab Results   Component Value Date    TRIG 73 06/13/2022     Lab Results   Component Value Date    HDL 59 06/13/2022     Lab Results   Component Value Date    LDLCALC 100 06/13/2022     Lab Results   Component Value Date    HGBA1C 5 5 10/19/2016     Lab Results   Component Value Date    SODIUM 138 06/13/2022    K 4 0 06/13/2022     06/13/2022    CO2 25 06/13/2022    ANIONGAP 12 11/02/2015    AGAP 5 06/13/2022    BUN 10 06/13/2022    CREATININE 0 72 06/13/2022    GLUC 88 05/05/2021    GLUF 86 06/13/2022    CALCIUM 9 2 06/13/2022    AST 13 06/13/2022    ALT 18 06/13/2022    ALKPHOS 48 06/13/2022    PROT 6 8 10/19/2016    TP 6 9 06/13/2022    BILITOT 0 4 10/19/2016    TBILI 0 43 06/13/2022    EGFR 100 06/13/2022       No orders of the defined types were placed in this encounter        ALLERGIES:  Allergies   Allergen Reactions    Codeine      Other reaction(s): Palpitations  Reaction Date: 03Aug2011;     Molds & Smuts     Wound Dressing Adhesive Rash     Redness         Current Medications     Current Outpatient Medications   Medication Sig Dispense Refill    EPINEPHrine (EpiPen 2-Sukhdev) 0 3 mg/0 3 mL SOAJ Inject 0 3 mL (0 3 mg total) into a muscle as needed for anaphylaxis 2 each 1    famotidine (PEPCID) 40 MG tablet TAKE 1 TABLET BY MOUTH EVERY  tablet 1    levothyroxine 75 mcg tablet Take 1 tablet (75 mcg total) by mouth daily 90 tablet 3    LO LOESTRIN FE 1 MG-10 MCG / 10 MCG TABS Take 1 tablet by mouth daily        Loratadine 10 MG CAPS Take by mouth      multivitamin (THERAGRAN) TABS Take 1 tablet by mouth daily      sertraline (Zoloft) 50 mg tablet Take half a tablet once a day after food for 6 days, then increase dose to 1 tablet daily after breakfast 90 tablet 1    SUMAtriptan (IMITREX) 25 mg tablet TAKE 1 TAB AT ONSET OF MIGRAINE, MAY REPEAT X 1 IN 2HRS IF NEEDED 9 tablet 3    SUMAtriptan (IMITREX) 50 mg tablet TAKE 1 TABLET AT THE ONSET OF A MIGRAINE, MAY REPEAT IN 2 HOURS IF NEEDED 9 tablet 3     No current facility-administered medications for this visit         Medications Discontinued During This Encounter   Medication Reason    amoxicillin (AMOXIL) 500 mg capsule Therapy completed    betamethasone acetate-betamethasone sodium phosphate (CELESTONE) 6 (3-3) mg/mL Therapy completed    bupivacaine (MARCAINE) 0 25 % Therapy completed    dexamethasone (DECADRON) 4 mg/mL Therapy completed    lidocaine (XYLOCAINE) 1 % Therapy completed    esomeprazole (NexIUM) 40 MG capsule Alternate therapy    meloxicam (MOBIC) 15 mg tablet     methocarbamol (ROBAXIN) 500 mg tablet     EPINEPHrine (EPIPEN) 0 3 mg/0 3 mL SOAJ     levothyroxine 75 mcg tablet Reorder       Health Maintenance     Health Maintenance   Topic Date Due    Hepatitis C Screening  Never done    Cervical Cancer Screening  10/22/2020    Influenza Vaccine (Season Ended) 09/01/2022    Breast Cancer Screening: Mammogram  10/21/2022    BMI: Adult  06/17/2023    Annual Physical  06/17/2023    Colorectal Cancer Screening  10/03/2024    DTaP,Tdap,and Td Vaccines (3 - Td or Tdap) 12/15/2025    HIV Screening  Completed    COVID-19 Vaccine  Completed    Pneumococcal Vaccine: Pediatrics (0 to 5 Years) and At-Risk Patients (6 to 59 Years)  Aged Out    HIB Vaccine  Aged Out    Hepatitis B Vaccine  Aged Out    IPV Vaccine  Aged Out    Hepatitis A Vaccine  Aged Out    Meningococcal ACWY Vaccine  Aged Out    HPV Vaccine  Aged [employer] History   Administered Date(s) Administered    COVID-19 PFIZER VACCINE 0 3 ML IM 03/29/2021, 04/19/2021, 11/19/2021    Influenza, injectable, quadrivalent, preservative free 0 5 mL 11/16/2020    Tdap 09/18/2013, 12/15/2015       Rafael Dietrich MD

## 2022-06-17 NOTE — ASSESSMENT & PLAN NOTE
Significant stress for over 2 years   over a year ago, moved, good family and friend support  Chronic symptoms of dysthymia and anxiety  Referral for counseling  Start Zoloft, patient will update me regarding her progress via my chart in 4 to 6 weeks  English

## 2022-06-17 NOTE — ASSESSMENT & PLAN NOTE
Imitrex 75  Mg PRN  [facility] Neurology, Dr Davis- pending OV July  Trial of Zoloft for anxiety/depression may favorably affect frequency and severity of chronic migraines

## 2022-06-20 ENCOUNTER — TELEPHONE (OUTPATIENT)
Dept: FAMILY MEDICINE CLINIC | Facility: CLINIC | Age: 47
End: 2022-06-20

## 2022-06-20 DIAGNOSIS — F43.23 ADJUSTMENT REACTION WITH ANXIETY AND DEPRESSION: Primary | ICD-10-CM

## 2022-06-20 RX ORDER — CITALOPRAM 10 MG/1
10 TABLET ORAL DAILY
Qty: 30 TABLET | Refills: 1 | Status: SHIPPED | OUTPATIENT
Start: 2022-06-20 | End: 2022-07-14

## 2022-06-20 NOTE — TELEPHONE ENCOUNTER
Xochitlrahul called and stated that she started the Zoloft on Saturday morning and took a half a pill sat and Sunday  After taking it she stated that she started to feel Nauseas, Dizzy and Lightheaded  She has not taken it yet this morning but she would like to know if this is normal or should she stop this medication    Please call to advise

## 2022-06-20 NOTE — TELEPHONE ENCOUNTER
Discontinue Zoloft  We will try alternative medication  Patient should "rest" without any medications for 2 to 3 days, then she will start  Celexa 10 mg daily, I will send prescription to the pharmacy now      Thank you

## 2022-06-22 ENCOUNTER — OFFICE VISIT (OUTPATIENT)
Dept: FAMILY MEDICINE CLINIC | Facility: CLINIC | Age: 47
End: 2022-06-22
Payer: COMMERCIAL

## 2022-06-22 VITALS
BODY MASS INDEX: 19.29 KG/M2 | TEMPERATURE: 98 F | HEIGHT: 60 IN | HEART RATE: 67 BPM | OXYGEN SATURATION: 98 % | SYSTOLIC BLOOD PRESSURE: 94 MMHG | DIASTOLIC BLOOD PRESSURE: 60 MMHG | WEIGHT: 98.25 LBS | RESPIRATION RATE: 18 BRPM

## 2022-06-22 DIAGNOSIS — S70.361A TICK BITE OF RIGHT THIGH, INITIAL ENCOUNTER: Primary | ICD-10-CM

## 2022-06-22 DIAGNOSIS — Z20.9 EXPOSURE TO POTENTIAL INFECTION: ICD-10-CM
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
## 2022-06-22 PROCEDURE — 99213 OFFICE O/P EST LOW 20 MIN: CPT | Performed by: NURSE PRACTITIONER

## 2022-06-22 RX ORDER — DOXYCYCLINE HYCLATE 100 MG
100 TABLET ORAL 2 TIMES DAILY
Qty: 28 TABLET | Refills: 0 | Status: SHIPPED | OUTPATIENT
Start: 2022-06-22 | End: 2022-07-06

## 2022-06-22 NOTE — PROGRESS NOTES
FAMILY PRACTICE OFFICE VISIT       NAME: Bernabe Kamara  AGE: 55 y o  SEX: female       : 1975        MRN: [id number]    Assessment and Plan   1  Tick bite of right thigh, initial encounter  -     doxycycline hyclate (VIBRA-TABS) 100 mg tablet; Take 1 tablet (100 mg total) by mouth 2 (two) times a day for 14 days    2  Exposure to potential infection  -     doxycycline hyclate (VIBRA-TABS) 100 mg tablet; Take 1 tablet (100 mg total) by mouth 2 (two) times a day for 14 days       Right upper inner thigh tick bite, with deer tick  Living in L[place] endemic area, unsure how long tick embedded  Will treat for lyme prophylaxis with doxycycline  She will call with any questions  Chief Complaint     Chief Complaint   Patient presents with    Tick Removal     R leg , itchy        History of Present Illness     Bernabe Kamara is a 55year old female presenting today for tick bite  Yesterday felt like a scab, right thigh  It was a tick  Tick was embedded  Removed tick in full  She brought tick with her today  It is a deer tick  In the yard, grass, over the weekend  Not sure how long embedded  No rashes  Asymptomatic  Review of Systems   Review of Systems   Constitutional: Negative  Respiratory: Negative  Cardiovascular: Negative  Musculoskeletal: Negative  Skin: Negative for rash  Neurological: Negative  I have reviewed the patient's medical history in detail; there are no changes to the history as noted in the electronic medical record  Objective     Vitals:    22 1430   BP: 94/60   Pulse: 67   Resp: 18   Temp: 98 °F (36 7 °C)   SpO2: 98%   Weight: 44 6 kg (98 lb 4 oz)   Height: 5' (1 524 m)     Wt Readings from Last 3 Encounters:   22 44 6 kg (98 lb 4 oz)   22 44 9 kg (99 lb)   10/21/21 46 3 kg (102 lb)     Physical Exam  Vitals and nursing note reviewed  Constitutional:       General: She is not in acute distress       Appearance: Normal appearance  She is not ill-appearing  Cardiovascular:      Rate and Rhythm: Normal rate  Pulmonary:      Effort: Pulmonary effort is normal  No respiratory distress  Neurological:      Mental Status: She is alert  Psychiatric:         Mood and Affect: Mood normal             ALLERGIES:  Allergies   Allergen Reactions    Codeine      Other reaction(s): Palpitations  Reaction Date: 03Aug2011;     Molds & Smuts     Wound Dressing Adhesive Rash     Redness         Current Medications     Current Outpatient Medications   Medication Sig Dispense Refill    citalopram (CeleXA) 10 mg tablet Take 1 tablet (10 mg total) by mouth daily 30 tablet 1    doxycycline hyclate (VIBRA-TABS) 100 mg tablet Take 1 tablet (100 mg total) by mouth 2 (two) times a day for 14 days 28 tablet 0    EPINEPHrine (EpiPen 2-Sukhdev) 0 3 mg/0 3 mL SOAJ Inject 0 3 mL (0 3 mg total) into a muscle as needed for anaphylaxis 2 each 1    famotidine (PEPCID) 40 MG tablet TAKE 1 TABLET BY MOUTH EVERY  tablet 1    levothyroxine 75 mcg tablet Take 1 tablet (75 mcg total) by mouth daily 90 tablet 3    LO LOESTRIN FE 1 MG-10 MCG / 10 MCG TABS Take 1 tablet by mouth daily        Loratadine 10 MG CAPS Take by mouth      multivitamin (THERAGRAN) TABS Take 1 tablet by mouth daily      SUMAtriptan (IMITREX) 25 mg tablet TAKE 1 TAB AT ONSET OF MIGRAINE, MAY REPEAT X 1 IN 2HRS IF NEEDED 9 tablet 3    SUMAtriptan (IMITREX) 50 mg tablet TAKE 1 TABLET AT THE ONSET OF A MIGRAINE, MAY REPEAT IN 2 HOURS IF NEEDED 9 tablet 3     No current facility-administered medications for this visit           Health Maintenance     Health Maintenance   Topic Date Due    Hepatitis C Screening  Never done    Cervical Cancer Screening  10/22/2020    Influenza Vaccine (Season Ended) 09/01/2022    Breast Cancer Screening: Mammogram  10/21/2022    Annual Physical  06/17/2023    BMI: Adult  06/22/2023    Colorectal Cancer Screening  10/03/2024    DTaP,Tdap,and Td Vaccines (3 - Td or Tdap) 12/15/2025    HIV Screening  Completed    COVID-19 Vaccine  Completed    Pneumococcal Vaccine: Pediatrics (0 to 5 Years) and At-Risk Patients (6 to 59 Years)  Aged Out    HIB Vaccine  Aged Out    Hepatitis B Vaccine  Aged Out    IPV Vaccine  Aged Out    Hepatitis A Vaccine  Aged Out    Meningococcal ACWY Vaccine  Aged Out    HPV Vaccine  Aged [employer] History   Administered Date(s) Administered    COVID-19 PFIZER VACCINE 0 3 ML IM 03/29/2021, 04/19/2021, 11/19/2021    Influenza, injectable, quadrivalent, preservative free 0 5 mL 11/16/2020    Tdap 09/18/2013, 12/15/2015       GRABIEL Marin

## 2022-06-23 ENCOUNTER — OFFICE VISIT (OUTPATIENT)
Dept: OBGYN CLINIC | Facility: CLINIC | Age: 47
End: 2022-06-23
Payer: COMMERCIAL

## 2022-06-23 VITALS
SYSTOLIC BLOOD PRESSURE: 90 MMHG | DIASTOLIC BLOOD PRESSURE: 60 MMHG | HEIGHT: 60 IN | WEIGHT: 98.4 LBS | BODY MASS INDEX: 19.32 KG/M2

## 2022-06-23 DIAGNOSIS — Z12.31 ENCOUNTER FOR SCREENING MAMMOGRAM FOR MALIGNANT NEOPLASM OF BREAST: ICD-10-CM

## 2022-06-23 DIAGNOSIS — Z11.51 SCREENING FOR HPV (HUMAN PAPILLOMAVIRUS): ICD-10-CM

## 2022-06-23 DIAGNOSIS — Z01.419 WELL WOMAN EXAM WITH ROUTINE GYNECOLOGICAL EXAM: Primary | ICD-10-CM

## 2022-06-23 DIAGNOSIS — Z30.41 ENCOUNTER FOR SURVEILLANCE OF CONTRACEPTIVE PILLS: ICD-10-CM

## 2022-06-23 DIAGNOSIS — Z12.4 ENCOUNTER FOR SCREENING FOR MALIGNANT NEOPLASM OF CERVIX: ICD-10-CM

## 2022-06-23 PROCEDURE — G0476 HPV COMBO ASSAY CA SCREEN: HCPCS | Performed by: OBSTETRICS & GYNECOLOGY

## 2022-06-23 PROCEDURE — G0145 SCR C/V CYTO,THINLAYER,RESCR: HCPCS | Performed by: OBSTETRICS & GYNECOLOGY

## 2022-06-23 PROCEDURE — 3008F BODY MASS INDEX DOCD: CPT | Performed by: OBSTETRICS & GYNECOLOGY

## 2022-06-23 PROCEDURE — 1036F TOBACCO NON-USER: CPT | Performed by: OBSTETRICS & GYNECOLOGY

## 2022-06-23 PROCEDURE — 99386 PREV VISIT NEW AGE 40-64: CPT | Performed by: OBSTETRICS & GYNECOLOGY

## 2022-06-23 RX ORDER — NORETHINDRONE ACETATE AND ETHINYL ESTRADIOL, ETHINYL ESTRADIOL AND FERROUS FUMARATE 1MG-10(24)
1 KIT ORAL DAILY
Qty: 84 TABLET | Refills: 4 | Status: SHIPPED | OUTPATIENT
Start: 2022-06-23

## 2022-06-23 NOTE — PROGRESS NOTES
ASSESSMENT & PLAN:   Diagnoses and all orders for this visit:    Well woman exam with routine gynecological exam  -     Liquid-based pap, screening    Encounter for screening mammogram for malignant neoplasm of breast  -     Mammo screening bilateral w 3d & cad; Future    Encounter for screening for malignant neoplasm of cervix  -     Liquid-based pap, screening    Screening for HPV (human papillomavirus)  -     Liquid-based pap, screening          The following were reviewed in today's visit: ASCCP guidelines, Gardisil vaccination, STD testing breast self exam, mammography screening ordered, use and side effects of OCPs, adequate intake of calcium and vitamin D, exercise and healthy diet  Patient to return to office in yearly for annual exam      All questions have been answered to her satisfaction  CC:  Annual Gynecologic Examination  Chief Complaint   Patient presents with    Gynecologic Exam     ap 10/22/2015 neg/neg HPV  Mammo 10/21/2021   Colonoscopy 2021 repeat 3 years       HPI: Bernabe Kamara is a 55 y o  Adrian Tonja who presents for annual gynecologic examination  She has the following concerns:  None  New patient  Establish gyn care  Was seen previously by [facility] but recently relocated to [place]  Reports that her discharge has increased recently with change in consistency  Denies itching, irritation, or concern for infection  Not currently sexually active since her divorce about 1 5 years ago     She lives an active lifestyle and is planning a hiking trip to [place] with her mother in August        Health Maintenance:    Exercise: frequently  Breast exams/breast awareness: yes  Diet: well balanced diet  Last mammogram:   Colorectal cancer screenin, repeat       Past Medical History:   Diagnosis Date    Abnormal Pap smear of cervix     Cluster headache     Colon polyp     Disease of thyroid gland     GERD (gastroesophageal reflux disease)        Past Surgical History:   Procedure Laterality Date    ANKLE SURGERY Left 03/20    ANTERIOR CRUCIATE LIGAMENT REPAIR Right 2007    COLONOSCOPY      [facility] INJECTION LEFT HIP (ARTHROGRAM)  8/23/2021    HIP SURGERY Left 2014    nerve entrapment release    WRIST SURGERY Right 03/2009    Dr Vicky Andrade Right 02/07/2020       Past OB/Gyn History:   No LMP recorded  (Menstrual status: Birth Control)  Menopausal status: perimenopausal  Menopausal symptoms: None    Last Pap: 2015 : no abnormalities  History of abnormal Pap smear: yes - LEEP performed about 16 years ago  Normal paps since    Patient is not currently sexually active     STD testing: no  Current contraception: OCP (estrogen/progesterone)      Family History  Family History   Problem Relation Age of Onset    Hypothyroidism Mother    [facility] Migraines Mother     Hyperlipidemia Father         Pure    Stomach cancer Paternal Grandmother 80    Ovarian cancer Maternal Grandmother 48        over age 48   [facility] No Known Problems Sister     Lymphoma Maternal Grandfather 80    No Known Problems Paternal Grandfather     Prostate cancer Maternal Uncle     Prostate cancer Paternal Uncle        Family history of uterine or ovarian cancer: yes  Family history of breast cancer: no  Family history of colon cancer: no    Social History:  Social History     Socioeconomic History    Marital status: /Civil Union     Spouse name: Not on file    Number of children: Not on file    Years of education: Not on file    Highest education level: Not on file   Occupational History    Not on file   Tobacco Use    Smoking status: Never Smoker    Smokeless tobacco: Never Used   Vaping Use    Vaping Use: Never used   Substance and Sexual Activity    Alcohol use: Yes     Comment: Social     Drug use: Yes     Comment: CBD gummies    Sexual activity: Not Currently   Other Topics Concern    Not on file   Social History Narrative    Caffeine use     Social Determinants of Health     Financial Resource Strain: Not on file   Food Insecurity: Not on file   Transportation Needs: Not on file   Physical Activity: Not on file   Stress: Not on file   Social Connections: Not on file   Intimate Partner Violence: Not on file   Housing Stability: Not on file     Domestic violence screen: negative    Allergies: Allergies   Allergen Reactions    Codeine      Other reaction(s): Palpitations  Reaction Date: 03Aug2011;     Molds & Smuts     Wound Dressing Adhesive Rash     Redness         Medications:    Current Outpatient Medications:     doxycycline hyclate (VIBRA-TABS) 100 mg tablet, Take 1 tablet (100 mg total) by mouth 2 (two) times a day for 14 days, Disp: 28 tablet, Rfl: 0    EPINEPHrine (EpiPen 2-Sukhdev) 0 3 mg/0 3 mL SOAJ, Inject 0 3 mL (0 3 mg total) into a muscle as needed for anaphylaxis, Disp: 2 each, Rfl: 1    famotidine (PEPCID) 40 MG tablet, TAKE 1 TABLET BY MOUTH EVERY DAY, Disp: 270 tablet, Rfl: 1    levothyroxine 75 mcg tablet, Take 1 tablet (75 mcg total) by mouth daily, Disp: 90 tablet, Rfl: 3    LO LOESTRIN FE 1 MG-10 MCG / 10 MCG TABS, Take 1 tablet by mouth daily  , Disp: , Rfl:     Loratadine 10 MG CAPS, Take by mouth, Disp: , Rfl:     multivitamin (THERAGRAN) TABS, Take 1 tablet by mouth daily, Disp: , Rfl:     SUMAtriptan (IMITREX) 25 mg tablet, TAKE 1 TAB AT ONSET OF MIGRAINE, MAY REPEAT X 1 IN 2HRS IF NEEDED, Disp: 9 tablet, Rfl: 3    SUMAtriptan (IMITREX) 50 mg tablet, TAKE 1 TABLET AT THE ONSET OF A MIGRAINE, MAY REPEAT IN 2 HOURS IF NEEDED, Disp: 9 tablet, Rfl: 3    citalopram (CeleXA) 10 mg tablet, Take 1 tablet (10 mg total) by mouth daily (Patient not taking: No sig reported), Disp: 30 tablet, Rfl: 1    Review of Systems:  Review of Systems   Constitutional: Negative for activity change, appetite change and unexpected weight change  Respiratory: Negative for cough and shortness of breath  Cardiovascular: Negative for chest pain     Gastrointestinal: Negative for abdominal pain, constipation, diarrhea, nausea and vomiting  Genitourinary: Negative for difficulty urinating, dyspareunia, frequency, menstrual problem, pelvic pain, urgency, vaginal bleeding, vaginal discharge and vaginal pain  Musculoskeletal: Negative for back pain  Skin: Negative  Neurological: Negative for dizziness, weakness, light-headedness and headaches  Psychiatric/Behavioral: Negative  Physical Exam:  BP 90/60 (BP Location: Right arm, Patient Position: Sitting, Cuff Size: Standard)   Ht 5' (1 524 m)   Wt 44 6 kg (98 lb 6 4 oz)   BMI 19 22 kg/m²    Physical Exam  Constitutional:       General: She is not in acute distress  Appearance: Normal appearance  She is well-developed and normal weight  She is not diaphoretic  Genitourinary:      Vulva and bladder normal       No lesions in the vagina  Genitourinary Comments: Perineum normal in appearance, no lacerations, no ulcerations, no lesions visualized  Right Labia: No rash, tenderness or lesions  Left Labia: No tenderness, lesions or rash  No inguinal adenopathy present in the right or left side  No vaginal discharge, erythema, tenderness or bleeding  No vaginal prolapse present  No vaginal atrophy present  Right Adnexa: not tender, not full and no mass present  Left Adnexa: not tender, not full and no mass present  Cervix is nulliparous  No cervical motion tenderness, discharge, friability, lesion or polyp  No parametrium nodularity or thickening present  Uterus is not enlarged or tender  No uterine mass detected  Uterus is midaxial       No urethral prolapse or mass present  Bladder is not tender  Pelvic exam was performed with patient in the lithotomy position  Rectum:      No tenderness or external hemorrhoid  Breasts: Breasts are symmetrical       Right: No swelling, bleeding, mass, skin change or tenderness        Left: No swelling, bleeding, mass, skin change or tenderness  HENT:      Head: Normocephalic and atraumatic  Neck:      Thyroid: No thyromegaly or thyroid tenderness  Cardiovascular:      Rate and Rhythm: Normal rate and regular rhythm  Heart sounds: Normal heart sounds  No murmur heard  No friction rub  Pulmonary:      Effort: Pulmonary effort is normal  No respiratory distress  Breath sounds: Normal breath sounds  No wheezing or rales  Abdominal:      Palpations: Abdomen is soft  There is no mass  Tenderness: There is no abdominal tenderness  There is no guarding  Musculoskeletal:         General: No tenderness  Normal range of motion  Right lower leg: No edema  Left lower leg: No edema  Lymphadenopathy:      Lower Body: No right inguinal adenopathy  No left inguinal adenopathy  Neurological:      Mental Status: She is alert and oriented to person, place, and time  Skin:     General: Skin is warm and dry  Coloration: Skin is not pale  Findings: No erythema  Psychiatric:         Mood and Affect: Mood normal          Behavior: Behavior normal          Thought Content: Thought content normal          Judgment: Judgment normal    Vitals and nursing note reviewed

## 2022-06-24 LAB
HPV HR 12 DNA CVX QL NAA+PROBE: NEGATIVE
HPV16 DNA CVX QL NAA+PROBE: NEGATIVE
HPV18 DNA CVX QL NAA+PROBE: NEGATIVE

## 2022-06-28 LAB
LAB AP GYN PRIMARY INTERPRETATION: NORMAL
Lab: NORMAL
PATH INTERP SPEC-IMP: NORMAL

## 2022-06-29 DIAGNOSIS — B37.3 YEAST INFECTION OF THE VAGINA: Primary | ICD-10-CM

## 2022-06-29 RX ORDER — FLUCONAZOLE 150 MG/1
150 TABLET ORAL ONCE
Qty: 1 TABLET | Refills: 0 | Status: SHIPPED | OUTPATIENT
Start: 2022-06-29 | End: 2022-06-29

## 2022-07-07 ENCOUNTER — TELEPHONE (OUTPATIENT)
Dept: NEUROLOGY | Facility: CLINIC | Age: 47
End: 2022-07-07

## 2022-07-07 NOTE — TELEPHONE ENCOUNTER
Spoke to patient and confirmed her 7/12/2022 @ 10:30 am appointment with Dr Blaine Mooney and confirmed the [address] [facility] location

## 2022-07-14 DIAGNOSIS — F43.23 ADJUSTMENT REACTION WITH ANXIETY AND DEPRESSION: ICD-10-CM

## 2022-07-14 RX ORDER — CITALOPRAM 10 MG/1
TABLET ORAL
Qty: 90 TABLET | Refills: 1 | Status: SHIPPED | OUTPATIENT
Start: 2022-07-14

## 2022-08-25 ENCOUNTER — OFFICE VISIT (OUTPATIENT)
Dept: GASTROENTEROLOGY | Facility: MEDICAL CENTER | Age: 47
End: 2022-08-25
Payer: COMMERCIAL

## 2022-08-25 VITALS
HEART RATE: 76 BPM | WEIGHT: 97.8 LBS | SYSTOLIC BLOOD PRESSURE: 117 MMHG | TEMPERATURE: 98.7 F | DIASTOLIC BLOOD PRESSURE: 77 MMHG | BODY MASS INDEX: 19.1 KG/M2

## 2022-08-25 DIAGNOSIS — K21.9 GASTROESOPHAGEAL REFLUX DISEASE WITHOUT ESOPHAGITIS: Primary | ICD-10-CM

## 2022-08-25 DIAGNOSIS — B37.81 CANDIDA ESOPHAGITIS (HCC): ICD-10-CM

## 2022-08-25 PROCEDURE — 99214 OFFICE O/P EST MOD 30 MIN: CPT | Performed by: INTERNAL MEDICINE

## 2022-08-25 NOTE — PROGRESS NOTES
[facility] - Outpatient Follow-up Note  aBri Reed 55 y o  female MRN: [id number]  Encounter: [id number]          ASSESSMENT AND PLAN:  49-year-old female with history GERD, hypothyroidism, migraine headaches who presents for follow-up evaluation  1  Gastroesophageal reflux disease without esophagitis  She has history of chronic reflux symptoms  She underwent endoscopy in 2020 showing small hiatal hernia  She was using Pepcid as needed at night  Symptoms were controlled but recently she has been having chest pressure and intermittent nausea  I discussed dietary/lifestyle anti-reflux measures her today including avoiding late night eating, trigger foods, sleeping with the head of the bed elevated  I recommended she use Pepcid 40 mg daily for the next 4 weeks, after that she can continue to use Pepcid as needed  If she has ongoing symptoms with as needed use she may require 20-40 mg nightly be taking regularly  She has no indication for repeat endoscopy at this time however she has ongoing symptoms despite acid suppression therapy consider repeat EGD for evaluation  2  Candida esophagitis ([facility] )  She was found to have Candida esophagitis on EGD in 2020  She was treated with a course of fluconazole and completed the course at that time  Follow-up in 3 months    ______________________________________________________________________    SUBJECTIVE:  49-year-old female with history GERD, hypothyroidism, migraine headaches who presents for follow-up evaluation  She was last evaluated via telemedicine visit February 2021  She has a history of GERD for which she was using as needed H2 blocker therapy  She had undergone EGD in 2020 showing small hiatal hernia, white plaque throughout the esophagus  Cytology was consistent with Candida and she was treated with a course of fluconazole  Gastric and duodenal biopsies were unremarkable    Colonoscopy at that time showed a 20 mm flat cecal polyp just behind the IC valve  This was removed piecemeal and showed sessile serrated adenoma  Repeat colonoscopy was performed October 2021 showing scarring at the polypectomy site and pathology was normal   She was recommended repeat colonoscopy in 3 years  Interval history:  She reports symptoms of chest pressure and nausea  This can occur at night with eating late or with eating large amounts of food  She uses Pepcid as needed usually in the middle of the night with relief of her symptoms  She has no dysphagia or odynophagia  She has no regurgitation or sour taste in her mouth  Her bowel movements are regular, formed  Her appetite is good her weight is stable  REVIEW OF SYSTEMS IS OTHERWISE NEGATIVE    Ten point review of systems is negative other than stated as per HPI    Historical Information   Past Medical History:   Diagnosis Date    Abnormal Pap smear of cervix     Cluster headache     Colon polyp     Disease of thyroid gland     GERD (gastroesophageal reflux disease)      Past Surgical History:   Procedure Laterality Date    ANKLE SURGERY Left 03/20    ANTERIOR CRUCIATE LIGAMENT REPAIR Right 2007    COLONOSCOPY      [place] INJECTION LEFT HIP (ARTHROGRAM)  8/23/2021    HIP SURGERY Left 2014    nerve entrapment release    WRIST SURGERY Right 03/2009    Dr Nicki Conner Right 02/07/2020     Social History   Social History     Substance and Sexual Activity   Alcohol Use Yes    Comment: Social      Social History     Substance and Sexual Activity   Drug Use Yes    Comment: CBD gummies     Social History     Tobacco Use   Smoking Status Never Smoker   Smokeless Tobacco Never Used     Family History   Problem Relation Age of Onset    Hypothyroidism Mother    [facility] Migraines Mother     Hyperlipidemia Father         Pure    Stomach cancer Paternal Grandmother 80    Ovarian cancer Maternal Grandmother 48        over age 48    No Known Problems Sister     Lymphoma Maternal Grandfather 80    No Known Problems Paternal Grandfather     Prostate cancer Maternal Uncle     Prostate cancer Paternal Uncle        Meds/Allergies       Current Outpatient Medications:     EPINEPHrine (EpiPen 2-Sukhdev) 0 3 mg/0 3 mL SOAJ    famotidine (PEPCID) 40 MG tablet    levothyroxine 75 mcg tablet    Lo Loestrin Fe 1 MG-10 MCG / 10 MCG TABS    multivitamin (THERAGRAN) TABS    PREDNISONE PO    SUMAtriptan (IMITREX) 25 mg tablet    SUMAtriptan (IMITREX) 50 mg tablet    citalopram (CeleXA) 10 mg tablet    Loratadine 10 MG CAPS    Allergies   Allergen Reactions    Codeine      Other reaction(s): Palpitations  Reaction Date: 03Aug2011;     Molds & Smuts     Wound Dressing Adhesive Rash     Redness             Objective     Blood pressure 117/77, pulse 76, temperature 98 7 °F (37 1 °C), temperature source Tympanic, weight 44 4 kg (97 lb 12 8 oz), not currently breastfeeding  Body mass index is 19 1 kg/m²  PHYSICAL EXAM:      General Appearance:   Alert, cooperative, no distress   HEENT:   Normocephalic, atraumatic, anicteric  Neck:  Supple, symmetrical, trachea midline   Lungs:   Clear to auscultation bilaterally; no rales, rhonchi or wheezing; respirations unlabored    Heart[de-identified]   Regular rate and rhythm; no murmur, rub, or gallop  Abdomen:   Soft, non-tender, non-distended; normal bowel sounds; no masses, no organomegaly    Genitalia:   Deferred    Rectal:   Deferred    Extremities:  No cyanosis, clubbing or edema    Pulses:  2+ and symmetric    Skin:  No jaundice, rashes, or lesions    Lymph nodes:  No palpable cervical lymphadenopathy        Lab Results:   No visits with results within 1 Day(s) from this visit     Latest known visit with results is:   Office Visit on 06/23/2022   Component Date Value    Case Report 06/23/2022                      Value:Gynecologic Cytology Report                       Case: [id number]                                  Authorizing Provider: Oscar Watkins MD          Collected:           06/23/2022 1328              Ordering Location:     [facility]  Received:            06/23/2022 1328                                     Health                                                                       First Screen:          KINGA Wilkinson                                                       Specimen:    LIQUID-BASED PAP, SCREENING, Cervix                                                        Primary Interpretation 06/23/2022 Negative for intraepithelial lesion or malignancy     Interpretation 06/23/2022 Fungal organisms morphologically consistent with Candida spp     Specimen Adequacy 06/23/2022 Satisfactory for evaluation  Absence of endocervical/transformation zone component   Additional Information 06/23/2022                      Value: This result contains rich text formatting which cannot be displayed here   HPV Other HR 06/23/2022 Negative     HPV16 06/23/2022 Negative     HPV18 06/23/2022 Negative          Radiology Results:   No results found

## 2022-08-28 DIAGNOSIS — G43.009 MIGRAINE WITHOUT AURA AND WITHOUT STATUS MIGRAINOSUS, NOT INTRACTABLE: ICD-10-CM

## 2022-08-29 RX ORDER — SUMATRIPTAN 25 MG/1
TABLET, FILM COATED ORAL
Qty: 9 TABLET | Refills: 3 | Status: SHIPPED | OUTPATIENT
Start: 2022-08-29

## 2022-08-29 NOTE — TELEPHONE ENCOUNTER
Nursing, please see below  Pt last seen in march 2021, few missed appt  I will do current refill  However, best for pt to do a regular appt with me or alex annually or have pt pcp refill med if no need to come back to neuro

## 2022-08-29 NOTE — TELEPHONE ENCOUNTER
Please refill if appropriate  I have not seen this patient in in 3 years, your last visit with her was March 2021  She was a no show to her scheduled appt with you recently on 7/12/22, looks like she cancelled with you in March 2022, and nothing scheduled in the future

## 2022-09-15 ENCOUNTER — TELEPHONE (OUTPATIENT)
Dept: FAMILY MEDICINE CLINIC | Facility: CLINIC | Age: 47
End: 2022-09-15

## 2022-09-15 NOTE — TELEPHONE ENCOUNTER
Patient received the new COVID booster vaccine and the flu vaccine on Sunday the 11th and is still feeling very fatigued, heaviness in arms, and slightly lightheaded  Has been using Tylenol  She is beginning to get concerned as the day goes on

## 2022-10-11 DIAGNOSIS — Z91.030 BEE ALLERGY STATUS: Chronic | ICD-10-CM

## 2022-10-11 RX ORDER — EPINEPHRINE 0.3 MG/.3ML
0.3 INJECTION SUBCUTANEOUS AS NEEDED
Qty: 2 EACH | Refills: 1 | Status: SHIPPED | OUTPATIENT
Start: 2022-10-11

## 2022-10-24 ENCOUNTER — HOSPITAL ENCOUNTER (OUTPATIENT)
Dept: MAMMOGRAPHY | Facility: HOSPITAL | Age: 47
Discharge: HOME | End: 2022-10-24
Payer: COMMERCIAL

## 2022-10-24 VITALS — HEIGHT: 60 IN | WEIGHT: 97 LBS | BODY MASS INDEX: 19.04 KG/M2

## 2022-10-24 DIAGNOSIS — Z12.31 ENCOUNTER FOR SCREENING MAMMOGRAM FOR MALIGNANT NEOPLASM OF BREAST: ICD-10-CM

## 2022-10-24 PROCEDURE — 77067 SCR MAMMO BI INCL CAD: CPT

## 2022-10-24 PROCEDURE — 77063 BREAST TOMOSYNTHESIS BI: CPT

## 2022-11-07 NOTE — TELEPHONE ENCOUNTER
----- Message from Zohaib Burris MD sent at 4/28/2021  9:38 AM EDT -----  Please schedule patient for TPI with me Kalin,   It was good to meet you and your family today.      I will put in a request to get Pluvicto approved for you today.   It typically takes 2-6 weeks to get a dose ready from the factory.   You will need blood counts 7 days before your scheduled Pluvicto dose to ensure you are safe to receive the medication.  You will need to get this done before every dose.    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  This could be used in the future.   I will see you for a video visit before your second dose of Pluvicto to see how things are going.  If it is a long time before we can get you Pluvicto I will see you before your first dose.      I will also touch base with Dr. Romero regarding our discussion.      Please let us know if you have any other questions or concerns.      Dr. Blas

## 2022-12-16 ENCOUNTER — OFFICE VISIT (OUTPATIENT)
Dept: OBGYN CLINIC | Facility: CLINIC | Age: 47
End: 2022-12-16

## 2022-12-16 ENCOUNTER — APPOINTMENT (OUTPATIENT)
Dept: RADIOLOGY | Facility: CLINIC | Age: 47
End: 2022-12-16

## 2022-12-16 VITALS
HEART RATE: 84 BPM | DIASTOLIC BLOOD PRESSURE: 75 MMHG | SYSTOLIC BLOOD PRESSURE: 116 MMHG | BODY MASS INDEX: 19.63 KG/M2 | HEIGHT: 61 IN | WEIGHT: 104 LBS

## 2022-12-16 DIAGNOSIS — S29.012A STRAIN OF RHOMBOID MUSCLE, INITIAL ENCOUNTER: Primary | ICD-10-CM

## 2022-12-16 DIAGNOSIS — M25.511 RIGHT SHOULDER PAIN, UNSPECIFIED CHRONICITY: ICD-10-CM

## 2022-12-16 NOTE — PROGRESS NOTES
Assessment/Plan:  1  Strain of rhomboid muscle, initial encounter  Ambulatory Referral to Physical Therapy      2  Right shoulder pain, unspecified chronicity  XR shoulder 2+ vw right        Scribe Attestation    I,:  Yari Ramsay am acting as a scribe while in the presence of the attending physician :       I,:  Artur Tejeda MD personally performed the services described in this documentation    as scribed in my presence :             X-rays were performed in the office and reviewed  It was discussed with Lilian that her signs, symptoms, imaging and PE is consitent with a Rhomboid muscle strain  Her rotator cuff strength is full, I am not concerned for a rotator cuff tear  She will likely benefit from 6-8 weeks of formal PT for ROM/stretching exercises, strengthening exercises and postural exercises  If symptoms worsen or fail to improve she may benefit from trigger point injections  I will see her back in 6 weeks time for re-evaluation  Subjective:   Mehrdad Hurst is a 52 y o  female who presents to the office for evaluation of her right shoulder  She notes pain to her right shoulder blade  Pain has been ongoing for a few years but is getting worse  She notes pain will worsen with pulling motions, when laying on her right side or with twisting motions  She does stand up paddling and notes she is unable to go as far as she use to and feels weakness when doing so  She notes a few years ago she was working in the garden when she pulled/lifted something heavy and felt a "pop" to the posterior aspect of her right shoulder  She states that she did formal PT last spring/summer and it was partially beneficial for her  She feels her right shoulder is weak  She will take Tylenol as needed for pain control  She will use CBD cream as needed  Review of Systems   Constitutional: Negative for chills, fever and unexpected weight change  HENT: Negative for hearing loss, nosebleeds and sore throat  Eyes: Negative for pain, redness and visual disturbance  Respiratory: Negative for cough, shortness of breath and wheezing  Cardiovascular: Negative for chest pain, palpitations and leg swelling  Gastrointestinal: Negative for abdominal pain, nausea and vomiting  Endocrine: Negative for polydipsia and polyuria  Genitourinary: Negative for difficulty urinating and hematuria  Musculoskeletal: Negative for arthralgias, joint swelling and myalgias  Skin: Negative for rash and wound  Neurological: Negative for dizziness, numbness and headaches  Psychiatric/Behavioral: Negative for decreased concentration, dysphoric mood and suicidal ideas  The patient is not nervous/anxious            Past Medical History:   Diagnosis Date   • Abnormal Pap smear of cervix    • Cluster headache    • Colon polyp    • Disease of thyroid gland    • GERD (gastroesophageal reflux disease)        Past Surgical History:   Procedure Laterality Date   • ANKLE SURGERY Left 03/20   • ANTERIOR CRUCIATE LIGAMENT REPAIR Right 2007   • COLONOSCOPY     • FL INJECTION LEFT HIP (ARTHROGRAM)  8/23/2021   • HIP SURGERY Left 2014    nerve entrapment release   • WRIST SURGERY Right 03/2009    Dr Junior Braxton   • WRIST SURGERY Right 02/07/2020       Family History   Problem Relation Age of Onset   • Hypothyroidism Mother    • Migraines Mother    • Hyperlipidemia Father         Pure   • Stomach cancer Paternal Grandmother 80   • Ovarian cancer Maternal Grandmother 48        over age 48   • No Known Problems Sister    • Lymphoma Maternal Grandfather 80   • No Known Problems Paternal Grandfather    • Prostate cancer Maternal Uncle    • Prostate cancer Paternal Uncle        Social History     Occupational History   • Not on file   Tobacco Use   • Smoking status: Never   • Smokeless tobacco: Never   Vaping Use   • Vaping Use: Never used   Substance and Sexual Activity   • Alcohol use: Yes     Comment: Social    • Drug use: Yes     Comment: CBD gummies • Sexual activity: Not Currently         Current Outpatient Medications:   •  EPINEPHrine (EPIPEN) 0 3 mg/0 3 mL SOAJ, INJECT 0 3 ML (0 3 MG TOTAL) INTO A MUSCLE AS NEEDED FOR ANAPHYLAXIS, Disp: 2 each, Rfl: 1  •  famotidine (PEPCID) 40 MG tablet, TAKE 1 TABLET BY MOUTH EVERY DAY, Disp: 270 tablet, Rfl: 1  •  levothyroxine 75 mcg tablet, Take 1 tablet (75 mcg total) by mouth daily, Disp: 90 tablet, Rfl: 3  •  Lo Loestrin Fe 1 MG-10 MCG / 10 MCG TABS, Take 1 tablet by mouth daily, Disp: 84 tablet, Rfl: 4  •  Loratadine 10 MG CAPS, Take by mouth, Disp: , Rfl:   •  multivitamin (THERAGRAN) TABS, Take 1 tablet by mouth daily, Disp: , Rfl:   •  SUMAtriptan (IMITREX) 25 mg tablet, TAKE 1 TAB AT ONSET OF MIGRAINE, MAY REPEAT X 1 IN 2HRS IF NEEDED, Disp: 9 tablet, Rfl: 3  •  SUMAtriptan (IMITREX) 50 mg tablet, TAKE 1 TABLET AT THE ONSET OF A MIGRAINE, MAY REPEAT IN 2 HOURS IF NEEDED, Disp: 9 tablet, Rfl: 3    Allergies   Allergen Reactions   • Codeine      Other reaction(s): Palpitations  Reaction Date: 03Aug2011;    • Molds & Smuts    • Wound Dressing Adhesive Rash     Redness         Objective:  Vitals:    12/16/22 1450   BP: 116/75   Pulse: 84       Right Shoulder Exam     Range of Motion   Active abduction: 170   External rotation: 90   Internal rotation 0 degrees: T12     Muscle Strength   Abduction: 5/5   Internal rotation: 5/5   External rotation: 5/5     Other   Erythema: absent  Scars: absent  Sensation: normal  Pulse: present    Comments:  Pain to rhomboid area when reaching behind her back    Carmita scapula pain with strength testing of internal and external rotation   - Speed's test   - Charlene's test   Non apprehensive, but sore with ROM, more apprehensive posteriorly              Physical Exam  Vitals and nursing note reviewed  Constitutional:       Appearance: She is well-developed  HENT:      Head: Normocephalic and atraumatic  Eyes:      Extraocular Movements: Extraocular movements intact  Conjunctiva/sclera: Conjunctivae normal       Pupils: Pupils are equal, round, and reactive to light  Pulmonary:      Effort: Pulmonary effort is normal    Musculoskeletal:      Cervical back: Neck supple  Skin:     General: Skin is warm and dry  Neurological:      Mental Status: She is alert and oriented to person, place, and time  I have personally reviewed pertinent films in PACS and my interpretation is as follows:    X-rays of the right shoulder demonstrate no acute fracture or dislocation  No degenerative changes noted  This document was created using speech voice recognition software  Grammatical errors, random word insertions, pronoun errors, and incomplete sentences are an occasional consequence of this system due to software limitations, ambient noise, and hardware issues  Any formal questions or concerns about content, text, or information contained within the body of this dictation should be directly addressed to the provider for clarification

## 2022-12-22 ENCOUNTER — EVALUATION (OUTPATIENT)
Dept: PHYSICAL THERAPY | Facility: CLINIC | Age: 47
End: 2022-12-22

## 2022-12-22 DIAGNOSIS — S29.012D STRAIN OF RHOMBOID MUSCLE, SUBSEQUENT ENCOUNTER: Primary | ICD-10-CM

## 2022-12-22 NOTE — PROGRESS NOTES
PT Evaluation     Today's date: 2022  Patient name: Ivonne Christopher  : 1975  MRN: [id number]  Referring provider: Mar De La Torre*  Dx:   Encounter Diagnosis     ICD-10-CM    1  Strain of rhomboid muscle, subsequent encounter  S29 012D Ambulatory Referral to Physical Therapy          Start Time: 1330  Stop Time: 1400  Total time in clinic (min): 30 minutes    Assessment  Assessment details: Ivonne Christopher is a 52 y o  female who presents with complaints of Strain of rhomboid muscle, subsequent encounter  (primary encounter diagnosis)  No further referral appears necessary at this time based upon examination results  Patient is presenting with decreased thoracic ROM, joint hypomobility, and decreased strength leading to limitations with reaching, sleeping, completing ADLs, and participating in recreation  Patient reported improvement in symptoms following treatment performed today  Prognosis is good given HEP compliance and PT 1-2/wk  Positive prognostic indicators include positive attitude toward recovery  Please contact me if you have any questions or recommendations  Thank you for the opportunity to share in Lilian's care  Impairments: abnormal muscle tone, abnormal or restricted ROM, abnormal movement, impaired physical strength, lacks appropriate home exercise program, pain with function and poor posture     Symptom irritability: lowUnderstanding of Dx/Px/POC: good   Prognosis: good    Plan  Patient would benefit from: skilled physical therapy  Planned therapy interventions: joint mobilization, manual therapy, patient education, postural training, strengthening, therapeutic activities, therapeutic exercise, home exercise program, neuromuscular re-education and flexibility  Frequency: 1-2x    Duration in weeks: 8  Treatment plan discussed with: patient        Subjective Evaluation    History of Present Illness  Mechanism of injury: Patient reports performing a good amount of stand up paddle boarding and reports that she was gardening a few years ago and felt a pop when truning to the left and has not been the same since  She heard a pop with this  She reports that her strength with pulling has decreased significantly  Pain can radiate from shoulder to neck at times  Unable to sleep on right side or put weight through it  Pain usually worse after activity  Some days are good and some days not so good  Does use tennis ball to roll to area which helps slightly  Patient RHD and does report sleep disturbances  Initially had cough/sneeze provocation with pain to area  Had treatment in past for it with gentle manipulation  Occasional shoulder soreness but nothing extending into right arm  Does report right SI joint dysfunction which is improved with self stretching  Pain also with reaching that improves with moving and worse with sitting  Recurrent probem    Quality of life: good    Pain  Current pain rating: 3  At best pain ratin  At worst pain ratin  Quality: dull ache and tight  Alleviating factors: stretching  Aggravating factors: overhead activity  Progression: no change    Hand dominance: right    Treatments  Previous treatment: massage  Patient Goals  Patient goals for therapy: decreased pain, increased motion and increased strength  Patient goal: no pain with reaching or paddling, no pain with sleeping    Short Term:  1  Pt will report decreased levels of pain by at least 2 subjective ratings in 4 weeks  2  Pt will demonstrate improved ROM by at least 10 degrees in 4 weeks  3  Pt will demonstrate improved strength by 1/2 grade in 4 weeks  4  Pt will be able to sleep without pain in 4 weeks  5  Pt will be able to reach without pain in 4 weeks  Long Term:   1  Pt will be independent in their HEP in 8 weeks  2  Pt will be pain free with IADL's in 8 weeks  3  Pt will demonstrate 4/5 MMT mid and lower trap in 8 weeks    4  Pt will be able to return to exercising for health in 8 weeks     Objective    Cervical % of normal   Flex  100   Extn  75   SB Left 90p! SB Right 100p   ROT Left 100   ROT Right 100p! Thoracic % of normal  strength   Flex  76    Extn  100    ROT Left 100 4   ROT Right 75p! 4   Repetitive testing: thoracic extension with OP: improves rotation and pain             MMT         AROM    Shoulder       R       L        R          L   Flex  5 5 WNL WNL   Extn  5 5 WNL WNL   Abd  5 5 WNL WNL   Add  IR  5 5     ER  5 5 WNL WNL   Behind back IR   T6 T2          Low Trap 3+ 4     Mid Trap 3+ 4     Serratus A                           MMT    Elbow       R       L   Flex  5 5   Extn  5 5              MMT    Wrist       R         L   Flex  5 5   Extn  5 5    bent 5 5     Head positioning: forward head posture  Palpation:  T7, Increased tone right erector spinae  Scapular positioning: bilateral abduction  Posture: slouched sitting posture, correction improves    Cervical joint mobility: not tested    Thoracic mobility: hypomobile T7    Rib mobility: posterior positioned right 7th rib      Insurance:  AMA/CMS Eval/ Re-eval POC expires Anjana Mansi #/ Referral # Total    Start date  Expiration date Extension  Visit limitation? PT only or  PT+OT? Co-Insurance   CMS 12 22 22 2 16 23  Self Pay                          Precautions: standard, migraines  Patient provided verbal consent to treatment plan, grade 5 joint mobilization, and recommended interventions  Manuals 12 22        visit 1        T-spine gr  5 FB                          Neuro Re-Ed                                                                        Ther Ex         Rep   T-spine extn wit towel OP 3*10        Pt edu FB        Lumbar roll FB                                                              Modalities

## 2022-12-29 ENCOUNTER — OFFICE VISIT (OUTPATIENT)
Dept: PHYSICAL THERAPY | Facility: CLINIC | Age: 47
End: 2022-12-29

## 2022-12-29 DIAGNOSIS — S29.012D STRAIN OF RHOMBOID MUSCLE, SUBSEQUENT ENCOUNTER: Primary | ICD-10-CM

## 2023-01-05 ENCOUNTER — OFFICE VISIT (OUTPATIENT)
Dept: PHYSICAL THERAPY | Facility: CLINIC | Age: 48
End: 2023-01-05

## 2023-01-05 DIAGNOSIS — S29.012D STRAIN OF RHOMBOID MUSCLE, SUBSEQUENT ENCOUNTER: Primary | ICD-10-CM

## 2023-01-05 NOTE — PROGRESS NOTES
Daily Note     Today's date: 2023  Patient name: Angy Lindquist  : 1975  MRN: [id number]  Referring provider: Marla Smalls*  Dx:   Encounter Diagnosis     ICD-10-CM    1  Strain of rhomboid muscle, subsequent encounter  S29 012D         Start Time: 1800  Stop Time: 1825  Total time in clinic (min): 25 minutes  Subjective: Patient reports on again/off again symptoms slightly improved with HEP     1 on 1 with PT for 20 minutes  Remaining time independent fitness program     Objective: See treatment diary below  Achiness/tightness with right rotation    Assessment: Tolerated treatment well  Patient reported feeling no discomfort with rotation after treatment today  Improved bilateral rotation demonstrated  Cueing needed for mid trap activation with rowing and prone modified T exercise  Added these to HEP  Plan: Continue per plan of care  Insurance:  AMA/CMS Eval/ Re-eval POC expires Maximoanabela Palomo #/ Referral # Total    Start date  Expiration date Extension  Visit limitation? PT only or  PT+OT? Co-Insurance   CMS 12 22 22 2 16 23  Self Pay                          Precautions: standard, migraines  Patient provided verbal consent to treatment plan, grade 5 joint mobilization, and recommended interventions  Manuals 12 22 12 29 1 5 23      visit 1 2 3      T-spine gr  5 FB Gr  2-3       MWM  To left with ribs 2*10       Right 7th rib gr  5   FB               Neuro Re-Ed                                             Ther Ex         Rep   T-spine extn wit towel OP 3*10 3*10 1*10      Pt edu FB  FB      Lumbar roll FB        rows   3*10 GTB      B/L ER   3*10 RTB      Prone mod T   3*10                                 Modalities

## 2023-01-06 ENCOUNTER — OFFICE VISIT (OUTPATIENT)
Dept: FAMILY MEDICINE CLINIC | Facility: CLINIC | Age: 48
End: 2023-01-06

## 2023-01-06 ENCOUNTER — HOSPITAL ENCOUNTER (OUTPATIENT)
Dept: RADIOLOGY | Facility: HOSPITAL | Age: 48
Discharge: HOME | End: 2023-01-06

## 2023-01-06 VITALS
SYSTOLIC BLOOD PRESSURE: 100 MMHG | OXYGEN SATURATION: 99 % | HEIGHT: 61 IN | TEMPERATURE: 97.8 F | WEIGHT: 103 LBS | RESPIRATION RATE: 16 BRPM | BODY MASS INDEX: 19.45 KG/M2 | DIASTOLIC BLOOD PRESSURE: 70 MMHG | HEART RATE: 67 BPM

## 2023-01-06 DIAGNOSIS — R07.81 RIB PAIN: Primary | ICD-10-CM

## 2023-01-06 DIAGNOSIS — R07.81 RIB PAIN: ICD-10-CM

## 2023-01-06 NOTE — PROGRESS NOTES
FAMILY PRACTICE OFFICE VISIT       NAME: Zenaida Joy  AGE: 52 y o  SEX: female       : 1975        MRN: [id number]    Assessment and Plan   1  Rib pain     Rib pain from injury sustained with PT yesterday  Will send for stat right rib x-rays to rule out fracture  Tylenol, ice as needed for pain  Continue to do deep breathing exercises  No running or heavy lifting  May walk, flat surfaces, no hiking as she had planned  Chief Complaint     Chief Complaint   Patient presents with   • Rib Pain     Pt is here for rt sided rib pain 1 + day       History of Present Illness     Zenaida Joy is a 52year old female presenting today for rib pain  Following with PT for shoulder/back  issue  Last night, physical therapist did a procedure on her, where she was lying on her left side, and he pressed hard and fast on her right rib cage region  Felt like she was punched, lost breath  Later that night she stretched against wall with her hands on the wall and felt a pop  Having significant pain since then  Can't sleep   Any kind of movement hurts--lying down, rolling, over, getting up, reaching with right arm, sneezing, coughing, deep breathing right rib cage hurts  Sharp pain  No shortness of breath  Icing  Used CBD oil last night  Recent GERD symptoms  So she is avoiding NSAIDs  May use Tylenol  Heat makes it worse  Review of Systems   Review of Systems   Constitutional: Negative  HENT: Negative  Respiratory: Negative for cough and shortness of breath  Cardiovascular: Positive for chest pain (right rib pain )  I have reviewed the patient's medical history in detail; there are no changes to the history as noted in the electronic medical record      Objective     Vitals:    23 1412   BP: 100/70   Pulse: 67   Resp: 16   Temp: 97 8 °F (36 6 °C)   TempSrc: Temporal   SpO2: 99%   Weight: 46 7 kg (103 lb)   Height: 5' 1" (1 549 m)     Wt Readings from Last 3 Encounters:   01/06/23 46 7 kg (103 lb)   01/06/23 46 3 kg (102 lb)   12/16/22 47 2 kg (104 lb)     Physical Exam  Vitals and nursing note reviewed  Constitutional:       Appearance: Normal appearance  Cardiovascular:      Rate and Rhythm: Normal rate and regular rhythm  Heart sounds: No murmur heard  Pulmonary:      Effort: Pulmonary effort is normal  No respiratory distress  Breath sounds: Normal breath sounds  Chest:      Comments: Tenderness over 5,6,7 ribs worst at mid axillary line, less over anterior chest wall and posterior chest wall  No bruising  Neurological:      Mental Status: She is alert  ALLERGIES:  Allergies   Allergen Reactions   • Codeine      Other reaction(s): Palpitations  Reaction Date: 03Aug2011;    • Molds & Smuts    • Wound Dressing Adhesive Rash     Redness         Current Medications     Current Outpatient Medications   Medication Sig Dispense Refill   • EPINEPHrine (EPIPEN) 0 3 mg/0 3 mL SOAJ INJECT 0 3 ML (0 3 MG TOTAL) INTO A MUSCLE AS NEEDED FOR ANAPHYLAXIS 2 each 1   • famotidine (PEPCID) 40 MG tablet TAKE 1 TABLET BY MOUTH EVERY  tablet 1   • levothyroxine 75 mcg tablet Take 1 tablet (75 mcg total) by mouth daily 90 tablet 3   • Lo Loestrin Fe 1 MG-10 MCG / 10 MCG TABS Take 1 tablet by mouth daily 84 tablet 4   • Loratadine 10 MG CAPS Take by mouth     • multivitamin (THERAGRAN) TABS Take 1 tablet by mouth daily     • SUMAtriptan (IMITREX) 25 mg tablet TAKE 1 TAB AT ONSET OF MIGRAINE, MAY REPEAT X 1 IN 2HRS IF NEEDED 9 tablet 3   • SUMAtriptan (IMITREX) 50 mg tablet TAKE 1 TABLET AT THE ONSET OF A MIGRAINE, MAY REPEAT IN 2 HOURS IF NEEDED 9 tablet 3   • methocarbamol (Robaxin-750) 750 mg tablet Take 1 tablet (750 mg total) by mouth 3 (three) times a day as needed for muscle spasms 20 tablet 0     No current facility-administered medications for this visit           Health Maintenance     Health Maintenance   Topic Date Due   • Hepatitis C Screening  Never done   • Hepatitis B Vaccine (1 of 3 - 3-dose series) Never done   • COVID-19 Vaccine (4 - Booster for Pfizer series) 01/14/2022   • PT PLAN OF CARE  01/21/2023   • Annual Physical  06/23/2023   • Breast Cancer Screening: Mammogram  10/24/2023   • BMI: Adult  01/06/2024   • Colorectal Cancer Screening  10/03/2024   • DTaP,Tdap,and Td Vaccines (3 - Td or Tdap) 12/15/2025   • Cervical Cancer Screening  06/23/2027   • HIV Screening  Completed   • Influenza Vaccine  Completed   • Pneumococcal Vaccine: Pediatrics (0 to 5 Years) and At-Risk Patients (6 to 59 Years)  Aged Out   • HIB Vaccine  Aged Out   • IPV Vaccine  Aged Out   • Hepatitis A Vaccine  Aged Out   • Meningococcal ACWY Vaccine  Aged Out   • HPV Vaccine  Aged [employer] History   Administered Date(s) Administered   • COVID-19 PFIZER VACCINE 0 3 ML IM 03/29/2021, 04/19/2021, 11/19/2021   • INFLUENZA 10/12/2022   • Influenza, injectable, quadrivalent, preservative free 0 5 mL 11/16/2020   • Tdap 09/18/2013, 12/15/2015       GRABIEL Flowers

## 2023-01-10 ENCOUNTER — APPOINTMENT (OUTPATIENT)
Dept: PHYSICAL THERAPY | Facility: CLINIC | Age: 48
End: 2023-01-10

## 2023-01-13 ENCOUNTER — OFFICE VISIT (OUTPATIENT)
Dept: GASTROENTEROLOGY | Facility: MEDICAL CENTER | Age: 48
End: 2023-01-13

## 2023-01-13 VITALS
HEIGHT: 61 IN | HEART RATE: 81 BPM | BODY MASS INDEX: 19.13 KG/M2 | DIASTOLIC BLOOD PRESSURE: 62 MMHG | WEIGHT: 101.3 LBS | SYSTOLIC BLOOD PRESSURE: 106 MMHG | OXYGEN SATURATION: 96 %

## 2023-01-13 DIAGNOSIS — R10.13 EPIGASTRIC DISCOMFORT: ICD-10-CM

## 2023-01-13 DIAGNOSIS — K21.9 GASTROESOPHAGEAL REFLUX DISEASE WITHOUT ESOPHAGITIS: Primary | Chronic | ICD-10-CM

## 2023-01-13 RX ORDER — PANTOPRAZOLE SODIUM 40 MG/1
40 TABLET, DELAYED RELEASE ORAL
Qty: 30 TABLET | Refills: 3 | Status: SHIPPED | OUTPATIENT
Start: 2023-01-13

## 2023-01-13 NOTE — PROGRESS NOTES
[facility] - Outpatient Follow-up Note  Roberta De Dios 52 y o  female MRN: [id number]  Encounter: [id number]          ASSESSMENT AND PLAN:  27-year-old female with history of GERD, migraine headaches who presents for follow-up evaluation  1  Gastroesophageal reflux disease without esophagitis  2  Epigastric discomfort  She has a history of epigastric discomfort, GERD symptoms and underwent EGD in 2020 showing small hiatal hernia, Candida esophagitis for which she was treated with a course of fluconazole  She continues to take 40 mg of famotidine at night with persistent symptoms  I discussed dietary/lifestyle antireflux measures with her today  Abdominal ultrasound is ordered to exclude biliary etiology  I recommend that she trial PPI with pantoprazole 40 mg to be taken 30 minutes before breakfast for at least 8 weeks  If she has no improvement on the symptoms consider repeat EGD and pH testing off PPI to evaluate for overt signs of acid reflux or reflux hypersensitivity/functional etiologies    - US right upper quadrant; Future  - pantoprazole (PROTONIX) 40 mg tablet; Take 1 tablet (40 mg total) by mouth daily before breakfast  Dispense: 30 tablet; Refill: 3      Follow-up in 3 months  ______________________________________________________________________    SUBJECTIVE: 27-year-old female with history of GERD, migraine headaches who presents for follow-up evaluation  She was last seen in the GI office August 2022  She has a history of chronic reflux symptoms  She underwent EGD in 2020 showing small hiatal hernia and Candida esophagitis for which she was treated with a course of fluconazole  At her last office visit she was using Pepcid as needed but had recurrent symptoms of chest pressure and was recommended to use Pepcid 40 mg for 1 month then decrease to lowest effective dose  Interval history: She has been having epigastric discomfort and pressure sensation    This can occur at night  She avoids late night eating and does not eat acid triggering foods  She denies nausea, vomiting  Her appetite is good  She continues to take Pepcid 40 mg at night  She has regular, formed bowel movements without melena or hematochezia        Colonoscopy at that time showed a 20 mm flat cecal polyp just behind the IC valve  This was removed piecemeal and showed sessile serrated adenoma  Repeat colonoscopy was performed October 2021 showing scarring at the polypectomy site and pathology was normal   She was recommended repeat colonoscopy in 3 years  REVIEW OF SYSTEMS IS OTHERWISE NEGATIVE    10 point review of systems is negative other than stated as per HPI    Historical Information   Past Medical History:   Diagnosis Date   • Abnormal Pap smear of cervix    • Cluster headache    • Colon polyp    • Disease of thyroid gland    • GERD (gastroesophageal reflux disease)      Past Surgical History:   Procedure Laterality Date   • ANKLE SURGERY Left 03/20   • ANTERIOR CRUCIATE LIGAMENT REPAIR Right 2007   • COLONOSCOPY     • FL INJECTION LEFT HIP (ARTHROGRAM)  8/23/2021   • HIP SURGERY Left 2014    nerve entrapment release   • WRIST SURGERY Right 03/2009    Dr Estevan Ramos   • WRIST SURGERY Right 02/07/2020     Social History   Social History     Substance and Sexual Activity   Alcohol Use Yes    Comment: Social      Social History     Substance and Sexual Activity   Drug Use Yes    Comment: CBD gummies     Social History     Tobacco Use   Smoking Status Never   Smokeless Tobacco Never     Family History   Problem Relation Age of Onset   • Hypothyroidism Mother    • Migraines Mother    • Hyperlipidemia Father         Pure   • Stomach cancer Paternal Grandmother 80   • Ovarian cancer Maternal Grandmother 48        over age 48   • No Known Problems Sister    • Lymphoma Maternal Grandfather 80   • No Known Problems Paternal Grandfather    • Prostate cancer Maternal Uncle    • Prostate cancer Paternal Uncle        Meds/Allergies       Current Outpatient Medications:   •  EPINEPHrine (EPIPEN) 0 3 mg/0 3 mL SOAJ  •  famotidine (PEPCID) 40 MG tablet  •  levothyroxine 75 mcg tablet  •  Lo Loestrin Fe 1 MG-10 MCG / 10 MCG TABS  •  Loratadine 10 MG CAPS  •  methocarbamol (Robaxin-750) 750 mg tablet  •  multivitamin (THERAGRAN) TABS  •  SUMAtriptan (IMITREX) 25 mg tablet  •  SUMAtriptan (IMITREX) 50 mg tablet    Allergies   Allergen Reactions   • Codeine      Other reaction(s): Palpitations  Reaction Date: 03Aug2011;    • Molds & Smuts    • Wound Dressing Adhesive Rash     Redness             Objective     Blood pressure 106/62, pulse 81, height 5' 1" (1 549 m), weight 45 9 kg (101 lb 4 8 oz), SpO2 96 %, not currently breastfeeding  There is no height or weight on file to calculate BMI  PHYSICAL EXAM:      General Appearance:   Alert, cooperative, no distress   HEENT:   Normocephalic, atraumatic, anicteric  Neck:  Supple, symmetrical, trachea midline   Lungs:   Clear to auscultation bilaterally; no rales, rhonchi or wheezing; respirations unlabored    Heart[de-identified]   Regular rate and rhythm; no murmur, rub, or gallop  Abdomen:   Soft, non-tender, non-distended; normal bowel sounds; no masses, no organomegaly    Genitalia:   Deferred    Rectal:   Deferred    Extremities:  No cyanosis, clubbing or edema    Pulses:  2+ and symmetric    Skin:  No jaundice, rashes, or lesions    Lymph nodes:  No palpable cervical lymphadenopathy        Lab Results:   No visits with results within 1 Day(s) from this visit     Latest known visit with results is:   Office Visit on 06/23/2022   Component Date Value   • Case Report 06/23/2022                      Value:Gynecologic Cytology Report                       Case: [id number]                                  Authorizing Provider:  Corean Aschoff, MD          Collected:           06/23/2022 1328              Ordering Location:     [address]  Received: 06/23/2022 1328                                     Health                                                                       First Screen:          Wellington Montiel, CT                                                       Specimen:    LIQUID-BASED PAP, SCREENING, Cervix                                                       • Primary Interpretation 06/23/2022 Negative for intraepithelial lesion or malignancy    • Interpretation 06/23/2022 Fungal organisms morphologically consistent with Candida spp    • Specimen Adequacy 06/23/2022 Satisfactory for evaluation  Absence of endocervical/transformation zone component  • Additional Information 06/23/2022                      Value: This result contains rich text formatting which cannot be displayed here  • HPV Other HR 06/23/2022 Negative    • HPV16 06/23/2022 Negative    • HPV18 06/23/2022 Negative          Radiology Results:   XR ribs right w pa chest min 3 views    Result Date: 1/6/2023  Narrative: RIGHT RIBS AND CHEST INDICATION:   R07 81: Pleurodynia  COMPARISON:  None VIEWS:  XR RIBS RIGHT W PA CHEST MIN 3 VIEWS FINDINGS: Cardiomediastinal silhouette appears unremarkable  Lungs are clear  No pleural effusions  There is no pneumothorax  No rib fractures are identified  Impression: No acute cardiopulmonary disease  No evidence of rib fractures  Workstation performed: [id number]     XR shoulder 2+ vw right    Result Date: 12/18/2022  Narrative: RIGHT SHOULDER INDICATION:   M25 511: Pain in right shoulder  COMPARISON:  None VIEWS:  XR SHOULDER 2+ VW RIGHT Images: 3 FINDINGS: There is no acute fracture or dislocation  No significant degenerative changes  No lytic or blastic osseous lesion  Soft tissues are unremarkable  Impression: No acute osseous abnormality   Workstation performed: [id number]

## 2023-01-13 NOTE — PATIENT INSTRUCTIONS
GERD (Gastroesophageal Reflux Disease)   AMBULATORY CARE:   Gastroesophageal reflux disease (GERD)  is reflux that happens more than 2 times a week for a few weeks  Reflux means acid and food in your stomach back up into your esophagus  GERD can cause other health problems over time if it is not treated  Common causes of GERD:  GERD often happens because the lower muscle (sphincter) of the esophagus does not close properly  The sphincter normally opens to let food into the stomach  It then closes to keep food and stomach acid in the stomach  If the sphincter does not close properly, stomach acid and food back up (reflux) into the esophagus  The following may increase your risk for GERD:  Certain foods such as spicy foods, chocolate, foods that contain caffeine, peppermint, and fried foods    Hiatal hernia    Certain medicines such as calcium channel blockers (used to treat high blood pressure), allergy medicines, sedatives, or antidepressants    Pregnancy, obesity, or scleroderma    Lying down after a meal    Drinking alcohol or smoking cigarettes    Signs and symptoms:   Heartburn (burning pain in your chest)    Pain after meals that spreads to your neck, jaw, or shoulder    Pain that gets better when you change positions    Bitter or acid taste in your mouth    A dry cough    Trouble swallowing or pain with swallowing    Hoarseness or a sore throat    Burping or hiccups    Feeling full soon after you start eating    Call your local emergency number (911 in the [address] [facility]) if:   You have severe chest pain and sudden trouble breathing  Seek care immediately if:   You have trouble breathing after you vomit  You have trouble swallowing, or pain with swallowing  Your bowel movements are black, bloody, or tarry-looking  Your vomit looks like coffee grounds or has blood in it  Call your doctor or gastroenterologist if:   You feel full and cannot burp or vomit      You vomit large amounts, or you vomit often     You are losing weight without trying  Your symptoms get worse or do not improve with treatment  You have questions or concerns about your condition or care  Treatment for GERD:   Medicines  are used to decrease stomach acid  Medicine may also be used to help your lower esophageal sphincter and stomach contract (tighten) more  Surgery  is done to wrap the upper part of the stomach around the esophageal sphincter  This will strengthen the sphincter and prevent reflux  Manage GERD:       Do not have foods or drinks that may increase heartburn  These include chocolate, peppermint, fried or fatty foods, drinks that contain caffeine, or carbonated drinks (soda)  Other foods include spicy foods, onions, tomatoes, and tomato-based foods  Do not have foods or drinks that can irritate your esophagus, such as citrus fruits, juices, and alcohol  Do not eat large meals  When you eat a lot of food at one time, your stomach needs more acid to digest it  Eat 6 small meals each day instead of 3 large meals, and eat slowly  Do not eat meals 2 to 3 hours before bedtime  Elevate the head of your bed  Place 6-inch blocks under the head of your bed frame  You may also use more than one pillow under your head and shoulders while you sleep  Maintain a healthy weight  If you are overweight, weight loss may help relieve symptoms of GERD  Do not smoke  Smoking weakens the lower esophageal sphincter and increases the risk of GERD  Ask your healthcare provider for information if you currently smoke and need help to quit  E-cigarettes or smokeless tobacco still contain nicotine  Talk to your healthcare provider before you use these products  Do not put pressure on your abdomen  Pressure pushes acid up into your esophagus  Do not wear clothing that is tight around your waist  Do not bend over  Bend at the knees if you need to pick something up      Follow up with your doctor or gastroenterologist as directed:  Write down your questions so you remember to ask them during your visits  © Copyright [employer] 2022 Information is for End User's use only and may not be sold, redistributed or otherwise used for commercial purposes  All illustrations and images included in CareNotes® are the copyrighted property of A D A M , Inc  or Manish Parker  The above information is an  only  It is not intended as medical advice for individual conditions or treatments  Talk to your doctor, nurse or pharmacist before following any medical regimen to see if it is safe and effective for you

## 2023-01-21 ENCOUNTER — HOSPITAL ENCOUNTER (OUTPATIENT)
Dept: ULTRASOUND IMAGING | Facility: HOSPITAL | Age: 48
Discharge: HOME | End: 2023-01-21
Attending: INTERNAL MEDICINE

## 2023-01-21 DIAGNOSIS — R10.13 EPIGASTRIC DISCOMFORT: ICD-10-CM

## 2023-01-30 ENCOUNTER — TELEPHONE (OUTPATIENT)
Dept: NEUROLOGY | Facility: CLINIC | Age: 48
End: 2023-01-30

## 2023-01-30 NOTE — TELEPHONE ENCOUNTER
BETZY-     Hello Good Morning,     Patient has called to request a BETZY from Dr Davis to Dr Jazmin Hayes due to location  Do you both approve for BETZY?     I thank you for your time,     Diane Nagy

## 2023-02-05 DIAGNOSIS — R10.13 EPIGASTRIC DISCOMFORT: ICD-10-CM

## 2023-02-05 DIAGNOSIS — K21.9 GASTROESOPHAGEAL REFLUX DISEASE WITHOUT ESOPHAGITIS: Chronic | ICD-10-CM

## 2023-02-05 RX ORDER — PANTOPRAZOLE SODIUM 40 MG/1
TABLET, DELAYED RELEASE ORAL
Qty: 90 TABLET | Refills: 2 | Status: SHIPPED | OUTPATIENT
Start: 2023-02-05 | End: 2023-02-15

## 2023-02-06 ENCOUNTER — OFFICE VISIT (OUTPATIENT)
Dept: OBGYN CLINIC | Facility: CLINIC | Age: 48
End: 2023-02-06

## 2023-02-06 VITALS
DIASTOLIC BLOOD PRESSURE: 75 MMHG | WEIGHT: 104.2 LBS | HEIGHT: 61 IN | BODY MASS INDEX: 19.67 KG/M2 | SYSTOLIC BLOOD PRESSURE: 118 MMHG | HEART RATE: 83 BPM

## 2023-02-06 DIAGNOSIS — R07.81 RIB PAIN: ICD-10-CM

## 2023-02-06 DIAGNOSIS — S29.012D STRAIN OF RHOMBOID MUSCLE, SUBSEQUENT ENCOUNTER: Primary | ICD-10-CM

## 2023-02-06 RX ORDER — MELOXICAM 15 MG/1
TABLET ORAL
COMMUNITY
Start: 2023-01-29

## 2023-02-06 NOTE — PROGRESS NOTES
Assessment/Plan:  1  Strain of rhomboid muscle, subsequent encounter  Ambulatory Referral to Physical Therapy      2  Rib pain  Ambulatory Referral to Physical Therapy        Scribe Attestation    I,:  Federico Doe am acting as a scribe while in the presence of the attending physician :       I,:  Christine Howard MD personally performed the services described in this documentation    as scribed in my presence :             Phillip Gather upon examination and review of the physical therapy notes does appear to be improving  I do feel that continuation of physical therapy is appropriate for her as she does appear to have some incremental improvements with a new physical therapist   The provided with a new prescription for this today  She may continue with activities of daily living as tolerated  I did explain that the musculature surrounding the ribs and the rhomboids to interact for appropriate function of the upper extremity and movement certainly can exacerbate her pain  She may progress to activities as tolerated and her symptoms allow  She verbalized understanding was amenable to treatment plan presented to her today  I will see her back on an as-needed basis  Subjective:   Hawa Kimball is a 52 y o  female who presents for follow-up evaluation of her right shoulder  She states that she does appreciate some incremental improvements with establishment of a new physical therapist   She did unfortunately suffer a rib injury from her last physical therapist   She states that reaching back exacerbate her pain most severely into the rhomboid region  She notes that her range of motion has for the most part been intact  She notes that her pain regards her ribs have not improved significantly  She states that she has still has not been able to return to running but does walk  She denies any distal paresthesias into the right upper extremity        Review of Systems   Constitutional: Negative for chills, fever and unexpected weight change  HENT: Negative for hearing loss, nosebleeds and sore throat  Eyes: Negative for pain, redness and visual disturbance  Respiratory: Negative for cough, shortness of breath and wheezing  Cardiovascular: Negative for chest pain, palpitations and leg swelling  Gastrointestinal: Negative for abdominal pain, nausea and vomiting  Endocrine: Negative for polydipsia and polyuria  Genitourinary: Negative for dysuria and hematuria  Musculoskeletal: Positive for arthralgias and back pain  See HPI   Skin: Negative for rash and wound  Neurological: Negative for dizziness, numbness and headaches  Psychiatric/Behavioral: Negative for decreased concentration and suicidal ideas  The patient is not nervous/anxious            Past Medical History:   Diagnosis Date   • Abnormal Pap smear of cervix    • Cluster headache    • Colon polyp    • Disease of thyroid gland    • GERD (gastroesophageal reflux disease)        Past Surgical History:   Procedure Laterality Date   • ANKLE SURGERY Left 03/2020   • ANTERIOR CRUCIATE LIGAMENT REPAIR Right 2007   • COLONOSCOPY     • FL INJECTION LEFT HIP (ARTHROGRAM)  08/23/2021   • HIP SURGERY Left 2014    nerve entrapment release   • TENDON REPAIR  04/06    ACL Repair   • WRIST SURGERY Right 03/2009    Dr Greg Shields   • WRIST SURGERY Right 02/07/2020       Family History   Problem Relation Age of Onset   • Hypothyroidism Mother    • Migraines Mother    • Hyperlipidemia Father         Pure   • Stomach cancer Paternal Grandmother 80   • Cancer Paternal Grandmother         Pancreatic at 80   • Ovarian cancer Maternal Grandmother 48        over age 48   • Cancer Maternal Grandmother         Ovarian at 68   • No Known Problems Sister    • Lymphoma Maternal Grandfather 80   • No Known Problems Paternal Grandfather    • Prostate cancer Maternal Uncle    • Prostate cancer Paternal Uncle        Social History     Occupational History   • Not on file Tobacco Use   • Smoking status: Never   • Smokeless tobacco: Never   Vaping Use   • Vaping Use: Never used   Substance and Sexual Activity   • Alcohol use: Not Currently     Comment: Social    • Drug use: Yes     Comment: CBD gummies   • Sexual activity: Not Currently     Partners: Male     Birth control/protection: Other     Comment: Birth Control loloestrin         Current Outpatient Medications:   •  EPINEPHrine (EPIPEN) 0 3 mg/0 3 mL SOAJ, INJECT 0 3 ML (0 3 MG TOTAL) INTO A MUSCLE AS NEEDED FOR ANAPHYLAXIS, Disp: 2 each, Rfl: 1  •  famotidine (PEPCID) 40 MG tablet, TAKE 1 TABLET BY MOUTH EVERY DAY, Disp: 270 tablet, Rfl: 1  •  levothyroxine 75 mcg tablet, Take 1 tablet (75 mcg total) by mouth daily, Disp: 90 tablet, Rfl: 3  •  Lo Loestrin Fe 1 MG-10 MCG / 10 MCG TABS, Take 1 tablet by mouth daily, Disp: 84 tablet, Rfl: 4  •  Loratadine 10 MG CAPS, Take by mouth, Disp: , Rfl:   •  methocarbamol (Robaxin-750) 750 mg tablet, Take 1 tablet (750 mg total) by mouth 3 (three) times a day as needed for muscle spasms, Disp: 20 tablet, Rfl: 0  •  multivitamin (THERAGRAN) TABS, Take 1 tablet by mouth daily, Disp: , Rfl:   •  pantoprazole (PROTONIX) 40 mg tablet, TAKE 1 TABLET BY MOUTH DAILY BEFORE BREAKFAST, Disp: 90 tablet, Rfl: 2  •  SUMAtriptan (IMITREX) 25 mg tablet, TAKE 1 TAB AT ONSET OF MIGRAINE, MAY REPEAT X 1 IN 2HRS IF NEEDED, Disp: 9 tablet, Rfl: 3  •  SUMAtriptan (IMITREX) 50 mg tablet, TAKE 1 TABLET AT THE ONSET OF A MIGRAINE, MAY REPEAT IN 2 HOURS IF NEEDED, Disp: 9 tablet, Rfl: 3  •  meloxicam (MOBIC) 15 mg tablet, , Disp: , Rfl:     Allergies   Allergen Reactions   • Codeine      Other reaction(s): Palpitations  Reaction Date: 03Aug2011;    • Molds & Smuts    • Wound Dressing Adhesive Rash     Redness         Objective:  Vitals:    02/06/23 0840   BP: 118/75   Pulse: 83       Right Shoulder Exam     Tenderness   Right shoulder tenderness location: Rhomboid      Range of Motion   Active abduction: 170 External rotation: 70   Internal rotation 0 degrees: T12     Muscle Strength   Abduction: 5/5   Internal rotation: 5/5   External rotation: 5/5     Other   Erythema: absent  Scars: absent  Sensation: normal  Pulse: present            Physical Exam  Vitals reviewed  HENT:      Head: Normocephalic and atraumatic  Eyes:      General:         Right eye: No discharge  Left eye: No discharge  Conjunctiva/sclera: Conjunctivae normal       Pupils: Pupils are equal, round, and reactive to light  Cardiovascular:      Rate and Rhythm: Normal rate  Pulmonary:      Effort: Pulmonary effort is normal  No respiratory distress  Musculoskeletal:      Cervical back: Normal range of motion and neck supple  Comments: As noted in HPI   Skin:     General: Skin is warm and dry  Neurological:      Mental Status: She is alert and oriented to person, place, and time  I have personally reviewed pertinent films in PACS and my interpretation is as follows: No images reviewed today  This document was created using speech voice recognition software  Grammatical errors, random word insertions, pronoun errors, and incomplete sentences are an occasional consequence of this system due to software limitations, ambient noise, and hardware issues  Any formal questions or concerns about content, text, or information contained within the body of this dictation should be directly addressed to the provider for clarification

## 2023-02-09 DIAGNOSIS — K21.9 GASTROESOPHAGEAL REFLUX DISEASE WITHOUT ESOPHAGITIS: Chronic | ICD-10-CM

## 2023-02-09 DIAGNOSIS — R10.13 EPIGASTRIC DISCOMFORT: ICD-10-CM

## 2023-02-13 ENCOUNTER — TELEPHONE (OUTPATIENT)
Dept: OBGYN CLINIC | Facility: CLINIC | Age: 48
End: 2023-02-13

## 2023-02-13 NOTE — TELEPHONE ENCOUNTER
LMOM for pt to cb to reschedule appointment on 3/20/2023, provider will be the the OR  Please help pt reschedule if she returns call

## 2023-02-14 RX ORDER — LANSOPRAZOLE 30 MG/1
CAPSULE, DELAYED RELEASE ORAL
Refills: 0 | OUTPATIENT
Start: 2023-02-14

## 2023-02-15 RX ORDER — LANSOPRAZOLE 30 MG/1
30 CAPSULE, DELAYED RELEASE ORAL
Qty: 30 CAPSULE | Refills: 3 | Status: SHIPPED | OUTPATIENT
Start: 2023-02-15

## 2023-03-11 DIAGNOSIS — K21.9 GASTROESOPHAGEAL REFLUX DISEASE WITHOUT ESOPHAGITIS: Chronic | ICD-10-CM

## 2023-03-11 RX ORDER — LANSOPRAZOLE 30 MG/1
CAPSULE, DELAYED RELEASE ORAL
Qty: 90 CAPSULE | Refills: 2 | Status: SHIPPED | OUTPATIENT
Start: 2023-03-11

## 2023-03-20 ENCOUNTER — TELEPHONE (OUTPATIENT)
Dept: NEUROLOGY | Facility: CLINIC | Age: 48
End: 2023-03-20

## 2023-03-20 NOTE — TELEPHONE ENCOUNTER
Called and spoke to patient to confirm their upcoming appointment with Dr Bobo Hanna  Informed patient about arriving in the [place] location 15 minutes prior to their appointment to get checked in and going over chart

## 2023-03-27 ENCOUNTER — OFFICE VISIT (OUTPATIENT)
Dept: NEUROLOGY | Facility: CLINIC | Age: 48
End: 2023-03-27

## 2023-03-27 VITALS
TEMPERATURE: 97.6 F | BODY MASS INDEX: 19.69 KG/M2 | HEART RATE: 78 BPM | SYSTOLIC BLOOD PRESSURE: 106 MMHG | DIASTOLIC BLOOD PRESSURE: 62 MMHG | WEIGHT: 104.2 LBS

## 2023-03-27 DIAGNOSIS — G43.009 MIGRAINE WITHOUT AURA AND WITHOUT STATUS MIGRAINOSUS, NOT INTRACTABLE: Primary | ICD-10-CM

## 2023-03-27 RX ORDER — RIZATRIPTAN BENZOATE 10 MG/1
10 TABLET ORAL ONCE AS NEEDED
Qty: 9 TABLET | Refills: 12 | Status: SHIPPED | OUTPATIENT
Start: 2023-03-27

## 2023-03-27 NOTE — PROGRESS NOTES
[facility] Consult  PATIENT:  Joshua Bain  MRN:  [id number]  :  1975  DATE OF SERVICE:  3/27/2023  REFERRED BY: No ref  provider found  PMD: Ambreen Dacosta MD    Assessment/Plan:     Joshua Bain is a very pleasant  52 y o  female with a past medical history that includes GERD, hypothyroidism, migraine, viral left ear hearing loss (s/p steroid injection which improved it by 80% and now near normal for age with misophonia in certain tonal ranges only), dyslipidemia, adjustment reaction with anxiety and depression, sacroiliitis, Synovial cyst of popliteal space, allergic rhinitis, abdominal pain, atypical chest pain, thoracic back pain, bladder disorder, dysuria, she can just do that IBS, hip pain, knee pain, wrist pain s/p surgery to shorten right ulnar bone and residual ulnar neuropathy symptoms, sinusitis, somatic dysfunction of rib, ankle sprain, vertigo, insomnia referred here for evaluation of headache  My initial evaluation 3/27/2023     Migraine without aura and without status migrainosus, not intractable  She reports a long history of migraine headaches dating back to her mid 35s and carsickness as a kid that we discussed may have been migraine  She previously followed with my neurology colleague for nonspecific white matter changes of the brain that we discussed are likely related to migraine and reports that past provider did not want to try alternative to sumatriptan for rescue and thus she is here for second opinion  She reports pain is always right frontal, retro-orbital and can radiate to the right cheek bone  She denies typical aura and reports prodrome at times of fatigue or significant hunger or thirst   We discussed how those with migraine aura may be at higher risk for stroke    She reports typical associated migrainous features as well as unilateral autonomic features although less likely trigeminal autonomic cephalalgia   -As of "3/27/2023 she reports migraines were more frequent in the past under more stressful conditions and now on average twice a month, but sumatriptan causes side effects and at 75 mg and we discussed trial of rizatriptan 10 mg (if she is nervous about side effects can try 5 mg) and if this is not tolerated or does not work we discussed trial of Nurtec or Ubrelvy next  Workup:  - MRI brain with and without contrast 2/3/2020: Stable MRI of the brain with a few scattered nonspecific white matter foci in the bifrontal subcortical white matter potentially related, gated migraine headaches  Other etiologies not excluded  Otherwise, normal MRI of the brain  Preventative:  - we discussed headache hygiene and lifestyle factors that may improve headaches  -We discussed there are multiple preventative options if she were ever to need these or want these  - Currently on through other providers: None  - Past/ failed/contraindicated: antihypertensive would be contraindicated due to history of hypotension and positional lightheadedness, sertraline side effects  - future options: Venlafaxine, topiramate, CGRP med, botox    Rescue:  - recommend not taking over-the-counter or prescription pain medications more than 3 days per week to prevent medication overuse/rebound headache  -  Trial of   rizatriptan (MAXALT) 10 mg tablet; Take 1 tablet (10 mg total) by mouth once as needed for migraine May repeat in 2 hours if needed  Max 2/24 hours, 9/month  Discussed proper use, possible side effects and risks    - Currently on through other providers: Stopping sumatriptan  - Past/ failed/contraindicated: Methocarbamol for shoulder, left meloxicam for shoulder, sumatriptan side effects   - future options:  prochlorperazine, Toradol IM or p o , could consider trial of 5 days of Depakote 500 mg nightly or dexamethasone 2 mg daily for prolonged migraine, nehemiah Giraldo, ricotec    Patient instructions       - \"Rewire Your Anxious Brain: How to " "Use the Neuroscience of Fear to End Anxiety, Panic, and Worry\" By Rosendo Miller PhD      Headache/migraine treatment:   Rescue medications (for immediate treatment of a headache): It is ok to take ibuprofen, acetaminophen or naproxen (Advil, Tylenol,  Aleve, Excedrin) if they help your headaches you should limit these to No more than 3 times a week to avoid medication overuse/rebound headaches  For your more moderate to severe migraines take this medication early   Maxalt (rizatriptan) 10mg tabs - take one at the onset of headache  May repeat one time after 2 hours if pain has not resolved  (Max 2 a day and 9 a month)     Next option Ubrelvy or nurtec and there is a coupon card at each of the websites for copay     Over the counter preventive supplements for headaches/migraines (if you try, try for 3 months straight)  (to take every day to help prevent headaches - not to take at the time of headache): There are combo pills online of these - none of which regulated by FDA and double check dosing - take appropriate dose only once a day- preventa migraine, migravent, mind ease, migrelief   [] Magnesium 400mg daily (If any diarrhea or upset stomach, decrease dose  as tolerated)  [] Riboflavin (Vitamin B2) 400mg daily - try online   (FYI B2 may make your urine bright/neon yellow)  AND/OR  [] Herbal medication: Petasites/Butterbur 150 mg daily - try online  (When choosing your Butterbur online or in the store, beware that there are some poor preps containing pyrrolizidine alkaloids (PAs) that can be harmful to the liver  Therefore, do not use butterbur products that are not labeled as PA-free )      Prescription preventive medications for headaches/migraines   (to take every day to help prevent headaches - not to take at the time of headache):  [x] we have options if needed      Lifestyle Recommendations:  [x] SLEEP - Maintain a regular sleep schedule: Adults need at least 7-8 hours of uninterrupted a night   Maintain " good sleep hygiene:  Going to bed and waking up at consistent times, avoiding excessive daytime naps, avoiding caffeinated beverages in the evening, avoid excessive stimulation in the evening and generally using bed primarily for sleeping  One hour before bedtime would recommend turning lights down lower, decreasing your activity (may read quietly, listen to music at a low volume)  When you get into bed, should eliminate all technology (no texting, emailing, playing with your phone, iPad or tablet in bed)  [x] HYDRATION - Maintain good hydration  Drink  2L of fluid a day (4 typical small water bottles)  [x] DIET - Maintain good nutrition  In particular don't skip meals and try and eat healthy balanced meals regularly  [x] TRIGGERS - Look for other triggers and avoid them: Limit caffeine to 1-2 cups a day or less  Avoid dietary triggers that you have noticed bring on your headaches (this could include aged cheese, peanuts, MSG, aspartame and nitrates)  [x] EXERCISE - physical exercise as we all know is good for you in many ways, and not only is good for your heart, but also is beneficial for your mental health, cognitive health and  chronic pain/headaches  I would encourage at the least 5 days of physical exercise weekly for at least 30 minutes  Education and Follow-up  [x] Please call with any questions or concerns  Of course if any new concerning symptoms go to the emergency department  [x] Follow up 6 months, sooner if needed        CC: We had the pleasure of evaluating Vanessa Vora in neurological consultation today  Vanessa Vora is a   right handed female who presents today for evaluation of headaches  History obtained from patient as well as available medical record review    History of Present Illness:   Current medical illnesses  or past medical history include GERD, hypothyroidism, migraine, viral left ear hearing loss (s/p steroid injection which improved it by 80% and now near normal for age with misophonia in certain tonal ranges only), dyslipidemia, adjustment reaction with anxiety and depression, sacroiliitis, Synovial cyst of popliteal space, allergic rhinitis, abdominal pain, atypical chest pain, thoracic back pain, bladder disorder, dysuria, she can just do that IBS, hip pain, knee pain, wrist pain s/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 Mid 27 years old  How often do the headaches occur?   - more when she ,  last year and that helped and removing soy has helped   - car sick as a kid   - as of 3/27/2023: on average 2 a month  What time of the day do the headaches start? No particular time of day   How long do the headaches last?  1 day typically without meds, 1-2 days  Are you ever headache free? Yes      Aura? without aura  prodome of fatigue and not sure if hungry or thirsty        Last eye exam: wears glasses for reading and should for distance, once a year usually, last was 1 5 years     Where is your headache located and pain quality?   - always right frontal and sometimes down bridge of nose on right  - retro-orbital   - puslating pressure   - rarely on left frontal and if there mild to nothing     What is the intensity of pain? Average: 5/10, worst rare debilitating like her mom, more like 7-8/10  Associated symptoms:   [] Nausea       [] Vomiting         [x] Photophobia     [x]Phonophobia      [x] Osmophobia  [] Blurred vision   [] Light-headed or dizzy     [] Tinnitus   [] Hands or feet tingle or feel numb/paresthesias    [] Ptosis      [] Facial droop  [x] Lacrimation - right   [x] Nasal congestion  - on and off frequently     Things that make the headache worse?  No specific movements, any movement    Headache triggers:  Smells, weather changes, stress, too much sleep, not enough sleep, not enough water    Have you seen someone else for headaches or pain? Yes, neurology   Have you had trigger point injection performed and how often? Yes pain management and did not help   Have you had Botox injection performed and how often? No   Have you had epidural injections or transforaminal injections performed? No  Are you current pregnant or planning on getting pregnant? No plans no chance, age 52 and they are testing hormones   Have you ever had any Brain imaging? yes     What medications do you take or have you taken for your headaches? ABORTIVE/pertinent p r n   Meds:      Sumatriptan 75 mg - helps but slowly and causes Side effects - woozy, super fatigue, heavy feeling and has to lay down     past  Meloxicam/Mobic 50 mg for shoulder  Methocarbamol for shoulder   Prednisone ok no issues    PREVENTIVE/pertinent daily meds:   -      Past/ failed/contraindicated:  antihypertensive would be contraindicated due to history of hypotension positional lightheadedness  Sertraline SE       LIFESTYLE  Sleep   - averages: broken, 7-7 5 hours a night  Problems falling asleep?:   Falls asleep around 11  Problems staying asleep?:  Yes, a couple times to pee and usually falls back asleep   Never had a sleep study  Does not feel like RLS to her, more brain  Snores on her back     Physical activity: daily runs and hikes or trail runs and paddle board in summer     Water: 8 bottles per day  Caffeine: 6 oz  per day    Mood: adjustment reaction with anxiety and depression, in counseling     The following portions of the patient's history were reviewed and updated as appropriate: allergies, current medications, past family history, past medical history, past social history, past surgical history and problem list     Pertinent family history:  Family history of headaches:  migraine headaches in mother and sister  Any family history of aneurysms - No    Pertinent social history:  Work: work food [employer]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 [employer]   Education: masters in photophraphy  Lives with cats - 2       Past Medical History:     Past Medical History:   Diagnosis Date   • Abnormal Pap smear of cervix    • Cluster headache    • Colon polyp    • Disease of thyroid gland    • GERD (gastroesophageal reflux disease)    • Headache, tension-type    • HL (hearing loss)     Sudden hearing loss hearing mostly came back   • Migraine        Patient Active Problem List   Diagnosis   • Hypothyroidism   • Abnormal MRI of head   • Hearing loss   • Headache, variant migraine   • Nasal septal deviation   • Gastroesophageal reflux disease   • Bee allergy status   • Colon polyp   • History of sudden hearing loss   • Strain of left groin   • Dyslipidemia   • Adjustment reaction with anxiety and depression       Medications:      Current Outpatient Medications   Medication Sig Dispense Refill   • EPINEPHrine (EPIPEN) 0 3 mg/0 3 mL SOAJ INJECT 0 3 ML (0 3 MG TOTAL) INTO A MUSCLE AS NEEDED FOR ANAPHYLAXIS 2 each 1   • famotidine (PEPCID) 40 MG tablet TAKE 1 TABLET BY MOUTH EVERY  tablet 1   • levothyroxine 75 mcg tablet Take 1 tablet (75 mcg total) by mouth daily 90 tablet 3   • Lo Loestrin Fe 1 MG-10 MCG / 10 MCG TABS Take 1 tablet by mouth daily 84 tablet 4   • Loratadine 10 MG CAPS Take by mouth daily as needed     • multivitamin (THERAGRAN) TABS Take 1 tablet by mouth daily     • rizatriptan (MAXALT) 10 mg tablet Take 1 tablet (10 mg total) by mouth once as needed for migraine May repeat in 2 hours if needed  Max 2/24 hours, 9/month  9 tablet 12   • lansoprazole (PREVACID) 30 mg capsule TAKE 1 CAPSULE BY MOUTH DAILY BEFORE BREAKFAST  (Patient not taking: Reported on 3/27/2023) 90 capsule 2     No current facility-administered medications for this visit  Allergies:       Allergies   Allergen Reactions   • Codeine      Other reaction(s): Palpitations  Reaction Date: 03Aug2011;    • Molds & Smuts    • Wound Dressing Adhesive Rash     Redness         Family History:     Family History Problem Relation Age of Onset   • Hypothyroidism Mother    • Migraines Mother    • Hyperlipidemia Father         Pure   • Stomach cancer Paternal Grandmother 80   • Cancer Paternal Grandmother         Pancreatic at 80   • Ovarian cancer Maternal Grandmother 48        over age 48   • Cancer Maternal Grandmother         Ovarian at 68   • No Known Problems Sister    • Lymphoma Maternal Grandfather 80   • No Known Problems Paternal Grandfather    • Prostate cancer Maternal Uncle    • Prostate cancer Paternal Uncle        Social History:       Social History     Socioeconomic History   • Marital status:      Spouse name: Not on file   • Number of children: Not on file   • Years of education: Not on file   • Highest education level: Not on file   Occupational History   • Not on file   Tobacco Use   • Smoking status: Never   • Smokeless tobacco: Never   Vaping Use   • Vaping Use: Never used   Substance and Sexual Activity   • Alcohol use: Not Currently     Comment: Social    • Drug use: Yes     Comment: CBD gummies   • Sexual activity: Not Currently     Partners: Male     Birth control/protection: Other     Comment: Birth Control loloestrin   Other Topics Concern   • Not on file   Social History Narrative    Caffeine use     Social Determinants of Health     Financial Resource Strain: Not on file   Food Insecurity: Not on file   Transportation Needs: Not on file   Physical Activity: Not on file   Stress: Not on file   Social Connections: Not on file   Intimate Partner Violence: Not on file   Housing Stability: Not on file         Objective:     Physical Exam:                                                                 Vitals:            Constitutional:    /62 (BP Location: Left arm, Patient Position: Sitting, Cuff Size: Adult)   Pulse 78   Temp 97 6 °F (36 4 °C) (Temporal)   Wt 47 3 kg (104 lb 3 2 oz)   BMI 19 69 kg/m²   BP Readings from Last 3 Encounters:   03/27/23 106/62   02/06/23 118/75 01/13/23 106/62     Pulse Readings from Last 3 Encounters:   03/27/23 78   02/06/23 83   01/13/23 81         Well developed, well nourished, well groomed  Psychiatric:  Normal behavior and appropriate affect        Neurological Examination:     Mental status/cognitive function:   Recent and remote memory appear intact  Attention span and concentration as well as fund of knowledge are appropriate for age  Normal language and spontaneous speech  Cranial Nerves:   VII-facial expression symmetric  Motor Exam: symmetric bulk throughout  no atrophy, fasciculations or abnormal movements noted  Coordination:  no apparent dysmetria, ataxia or tremor noted  Gait: steady casual gait     Pertinent lab results:   -6/13/2022 TSH normal  CMP unremarkable  CBC unremarkable except for  with normal hemoglobin 12 6  5/5/2021 vitamin D 25     Imaging:   - MRI brain with and without contrast 2/3/2020: Stable MRI of the brain with a few scattered nonspecific white matter foci in the bifrontal subcortical white matter potentially related, gated migraine headaches  Other etiologies not excluded  Otherwise, normal MRI of the brain   - MRI brain with without contrast 2/1/2017: No acute disease  Stable MR appearance of the brain  Nonspecific white matter changes right greater than left frontal lobes are stable  Query complicated migraine  Please see EMR for past brain imaging or spinal imaging       Review of Systems:   ROS obtained by medical assistant Personally reviewed and updated if indicated  I recommended PCP follow up for non neurologic problems  Review of Systems   Constitutional: Negative  Negative for appetite change and fever  HENT: Negative  Negative for hearing loss, tinnitus, trouble swallowing and voice change  Eyes: Negative  Negative for photophobia, pain and visual disturbance  Respiratory: Negative  Negative for shortness of breath  Cardiovascular: Negative    Negative for palpitations  Gastrointestinal: Negative  Negative for nausea and vomiting  Endocrine: Negative  Negative for cold intolerance  Genitourinary: Negative  Negative for dysuria, frequency and urgency  Musculoskeletal: Negative  Negative for gait problem, myalgias and neck pain  Skin: Negative  Negative for rash  Allergic/Immunologic: Negative  Neurological: Negative  Negative for dizziness, tremors, seizures, syncope, facial asymmetry, speech difficulty, weakness, light-headedness, numbness and headaches  Hematological: Negative  Does not bruise/bleed easily  Psychiatric/Behavioral: Negative  Negative for confusion, hallucinations and sleep disturbance  All other systems reviewed and are negative  I have spent 52 minutes with Patient today in which greater than 50% of this time was spent in counseling/coordination of care regarding Prognosis, Risks and benefits of tx options, Instructions for management, Patient and family education, Importance of tx compliance, Risk factor reductions, Impressions, Counseling / Coordination of care, Documenting in the medical record, Reviewing / ordering tests, medicine, procedures   and Obtaining or reviewing history    I also spent 24 minutes non face to face for this patient the same day           Author:  Nikko Torres MD 3/27/2023 5:58 PM

## 2023-03-27 NOTE — PATIENT INSTRUCTIONS
"  - \"Rewire Your Anxious Brain: How to Use the Neuroscience of Fear to End Anxiety, Panic, and Worry\" By Stella Green PhD      Headache/migraine treatment:   Rescue medications (for immediate treatment of a headache): It is ok to take ibuprofen, acetaminophen or naproxen (Advil, Tylenol,  Aleve, Excedrin) if they help your headaches you should limit these to No more than 3 times a week to avoid medication overuse/rebound headaches  For your more moderate to severe migraines take this medication early   Maxalt (rizatriptan) 10mg tabs - take one at the onset of headache  May repeat one time after 2 hours if pain has not resolved  (Max 2 a day and 9 a month)     Next option Ubrelvy or nurtec and there is a coupon card at each of the websites for copay     Over the counter preventive supplements for headaches/migraines (if you try, try for 3 months straight)  (to take every day to help prevent headaches - not to take at the time of headache): There are combo pills online of these - none of which regulated by FDA and double check dosing - take appropriate dose only once a day- preventa migraine, migravent, mind ease, migrelief   [] Magnesium 400mg daily (If any diarrhea or upset stomach, decrease dose  as tolerated)  [] Riboflavin (Vitamin B2) 400mg daily - try online   (FYI B2 may make your urine bright/neon yellow)  AND/OR  [] Herbal medication: Petasites/Butterbur 150 mg daily - try online  (When choosing your Butterbur online or in the store, beware that there are some poor preps containing pyrrolizidine alkaloids (PAs) that can be harmful to the liver   Therefore, do not use butterbur products that are not labeled as PA-free )      Prescription preventive medications for headaches/migraines   (to take every day to help prevent headaches - not to take at the time of headache):  [x] we have options if needed      Lifestyle Recommendations:  [x] SLEEP - Maintain a regular sleep schedule: Adults need at least 7-8 " hours of uninterrupted a night  Maintain good sleep hygiene:  Going to bed and waking up at consistent times, avoiding excessive daytime naps, avoiding caffeinated beverages in the evening, avoid excessive stimulation in the evening and generally using bed primarily for sleeping  One hour before bedtime would recommend turning lights down lower, decreasing your activity (may read quietly, listen to music at a low volume)  When you get into bed, should eliminate all technology (no texting, emailing, playing with your phone, iPad or tablet in bed)  [x] HYDRATION - Maintain good hydration  Drink  2L of fluid a day (4 typical small water bottles)  [x] DIET - Maintain good nutrition  In particular don't skip meals and try and eat healthy balanced meals regularly  [x] TRIGGERS - Look for other triggers and avoid them: Limit caffeine to 1-2 cups a day or less  Avoid dietary triggers that you have noticed bring on your headaches (this could include aged cheese, peanuts, MSG, aspartame and nitrates)  [x] EXERCISE - physical exercise as we all know is good for you in many ways, and not only is good for your heart, but also is beneficial for your mental health, cognitive health and  chronic pain/headaches  I would encourage at the least 5 days of physical exercise weekly for at least 30 minutes  Education and Follow-up  [x] Please call with any questions or concerns  Of course if any new concerning symptoms go to the emergency department    [x] Follow up 6 months, sooner if needed

## 2023-03-27 NOTE — LETTER
2023     Madelyn Matt MD  [address] [facility] [address]    Patient: Jayne Neal   YOB: 1975   Date of Visit: 3/27/2023       Dear Dr Shara Austin: Thank you for referring Jessica Rosa to me for evaluation  Below are my notes for this consultation  If you have questions, please do not hesitate to call me  I look forward to following your patient along with you  Sincerely,        Sergio Hurst MD        CC: No Recipients  Sergio Hurst MD  3/27/2023  5:58 PM  Sign when Signing Visit  [facility] Consult  PATIENT:  Jayne Neal  MRN:  [id number]  :  1975  DATE OF SERVICE:  3/27/2023  REFERRED BY: No ref  provider found  PMD: Madelyn Matt MD    Assessment/Plan:     Jayne Neal is a very pleasant  52 y o  female with a past medical history that includes GERD, hypothyroidism, migraine, viral left ear hearing loss (s/p steroid injection which improved it by 80% and now near normal for age with misophonia in certain tonal ranges only), dyslipidemia, adjustment reaction with anxiety and depression, sacroiliitis, Synovial cyst of popliteal space, allergic rhinitis, abdominal pain, atypical chest pain, thoracic back pain, bladder disorder, dysuria, she can just do that IBS, hip pain, knee pain, wrist pain s/p surgery to shorten right ulnar bone and residual ulnar neuropathy symptoms, sinusitis, somatic dysfunction of rib, ankle sprain, vertigo, insomnia referred here for evaluation of headache  My initial evaluation 3/27/2023     Migraine without aura and without status migrainosus, not intractable  She reports a long history of migraine headaches dating back to her mid 35s and carsickness as a kid that we discussed may have been migraine    She previously followed with my neurology colleague for nonspecific white matter changes of the brain that we discussed are likely related to migraine and reports that past provider did not want to try alternative to sumatriptan for rescue and thus she is here for second opinion  She reports pain is always right frontal, retro-orbital and can radiate to the right cheek bone  She denies typical aura and reports prodrome at times of fatigue or significant hunger or thirst   We discussed how those with migraine aura may be at higher risk for stroke  She reports typical associated migrainous features as well as unilateral autonomic features although less likely trigeminal autonomic cephalalgia   -As of 3/27/2023 she reports migraines were more frequent in the past under more stressful conditions and now on average twice a month, but sumatriptan causes side effects and at 75 mg and we discussed trial of rizatriptan 10 mg (if she is nervous about side effects can try 5 mg) and if this is not tolerated or does not work we discussed trial of Nurtec or Ubrelvy next  Workup:  - MRI brain with and without contrast 2/3/2020: Stable MRI of the brain with a few scattered nonspecific white matter foci in the bifrontal subcortical white matter potentially related, gated migraine headaches  Other etiologies not excluded  Otherwise, normal MRI of the brain  Preventative:  - we discussed headache hygiene and lifestyle factors that may improve headaches  -We discussed there are multiple preventative options if she were ever to need these or want these  - Currently on through other providers: None  - Past/ failed/contraindicated: antihypertensive would be contraindicated due to history of hypotension and positional lightheadedness, sertraline side effects  - future options: Venlafaxine, topiramate, CGRP med, botox    Rescue:  - recommend not taking over-the-counter or prescription pain medications more than 3 days per week to prevent medication overuse/rebound headache  -  Trial of   rizatriptan (MAXALT) 10 mg tablet;  Take 1 tablet (10 mg total) by mouth once as needed for migraine May "repeat in 2 hours if needed  Max 2/24 hours, 9/month  Discussed proper use, possible side effects and risks  - Currently on through other providers: Stopping sumatriptan  - Past/ failed/contraindicated: Methocarbamol for shoulder, left meloxicam for shoulder, sumatriptan side effects   - future options:  prochlorperazine, Toradol IM or p o , could consider trial of 5 days of Depakote 500 mg nightly or dexamethasone 2 mg daily for prolonged migraine, nehemiah Hurtado nurtec    Patient instructions       - \"Rewire Your Anxious Brain: How to Use the Neuroscience of Fear to End Anxiety, Panic, and Worry\" By Rimma Baker PhD      Headache/migraine treatment:   Rescue medications (for immediate treatment of a headache): It is ok to take ibuprofen, acetaminophen or naproxen (Advil, Tylenol,  Aleve, Excedrin) if they help your headaches you should limit these to No more than 3 times a week to avoid medication overuse/rebound headaches  For your more moderate to severe migraines take this medication early   Maxalt (rizatriptan) 10mg tabs - take one at the onset of headache  May repeat one time after 2 hours if pain has not resolved  (Max 2 a day and 9 a month)     Next option Ubrelvy or nurtec and there is a coupon card at each of the websites for copay     Over the counter preventive supplements for headaches/migraines (if you try, try for 3 months straight)  (to take every day to help prevent headaches - not to take at the time of headache):   There are combo pills online of these - none of which regulated by FDA and double check dosing - take appropriate dose only once a day- preventa migraine, migravent, mind ease, migrelief   [] Magnesium 400mg daily (If any diarrhea or upset stomach, decrease dose  as tolerated)  [] Riboflavin (Vitamin B2) 400mg daily - try online   (FYI B2 may make your urine bright/neon yellow)  AND/OR  [] Herbal medication: Petasites/Butterbur 150 mg daily - try online  (When choosing your " Butterbur online or in the store, beware that there are some poor preps containing pyrrolizidine alkaloids (PAs) that can be harmful to the liver  Therefore, do not use butterbur products that are not labeled as PA-free )      Prescription preventive medications for headaches/migraines   (to take every day to help prevent headaches - not to take at the time of headache):  [x] we have options if needed      Lifestyle Recommendations:  [x] SLEEP - Maintain a regular sleep schedule: Adults need at least 7-8 hours of uninterrupted a night  Maintain good sleep hygiene:  Going to bed and waking up at consistent times, avoiding excessive daytime naps, avoiding caffeinated beverages in the evening, avoid excessive stimulation in the evening and generally using bed primarily for sleeping  One hour before bedtime would recommend turning lights down lower, decreasing your activity (may read quietly, listen to music at a low volume)  When you get into bed, should eliminate all technology (no texting, emailing, playing with your phone, iPad or tablet in bed)  [x] HYDRATION - Maintain good hydration  Drink  2L of fluid a day (4 typical small water bottles)  [x] DIET - Maintain good nutrition  In particular don't skip meals and try and eat healthy balanced meals regularly  [x] TRIGGERS - Look for other triggers and avoid them: Limit caffeine to 1-2 cups a day or less  Avoid dietary triggers that you have noticed bring on your headaches (this could include aged cheese, peanuts, MSG, aspartame and nitrates)  [x] EXERCISE - physical exercise as we all know is good for you in many ways, and not only is good for your heart, but also is beneficial for your mental health, cognitive health and  chronic pain/headaches  I would encourage at the least 5 days of physical exercise weekly for at least 30 minutes  Education and Follow-up  [x] Please call with any questions or concerns   Of course if any new concerning symptoms go to the emergency department  [x] Follow up 6 months, sooner if needed        CC: We had the pleasure of evaluating Joann Anguiano in neurological consultation today  Joann Anguiano is a   right handed female who presents today for evaluation of headaches  History obtained from patient as well as available medical record review  History of Present Illness:   Current medical illnesses  or past medical history include GERD, hypothyroidism, migraine, viral left ear hearing loss (s/p steroid injection which improved it by 80% and now near normal for age with misophonia in certain tonal ranges only), dyslipidemia, adjustment reaction with anxiety and depression, sacroiliitis, Synovial cyst of popliteal space, allergic rhinitis, abdominal pain, atypical chest pain, thoracic back pain, bladder disorder, dysuria, she can just do that IBS, hip pain, knee pain, wrist pain s/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 Mid 27 years old  How often do the headaches occur?   - more when she ,  last year and that helped and removing soy has helped   - car sick as a kid   - as of 3/27/2023: on average 2 a month  What time of the day do the headaches start? No particular time of day   How long do the headaches last?  1 day typically without meds, 1-2 days  Are you ever headache free? Yes      Aura? without aura  prodome of fatigue and not sure if hungry or thirsty        Last eye exam: wears glasses for reading and should for distance, once a year usually, last was 1 5 years     Where is your headache located and pain quality?   - always right frontal and sometimes down bridge of nose on right  - retro-orbital   - puslating pressure   - rarely on left frontal and if there mild to nothing     What is the intensity of pain?  Average: 5/10, worst rare debilitating like her mom, more like 7-8/10  Associated symptoms:   [] Nausea       [] Vomiting         [x] Photophobia     [x]Phonophobia      [x] Osmophobia  [] Blurred vision   [] Light-headed or dizzy     [] Tinnitus   [] Hands or feet tingle or feel numb/paresthesias    [] Ptosis      [] Facial droop  [x] Lacrimation - right   [x] Nasal congestion  - on and off frequently     Things that make the headache worse? No specific movements, any movement    Headache triggers:  Smells, weather changes, stress, too much sleep, not enough sleep, not enough water    Have you seen someone else for headaches or pain? Yes, neurology   Have you had trigger point injection performed and how often? Yes pain management and did not help   Have you had Botox injection performed and how often? No   Have you had epidural injections or transforaminal injections performed? No  Are you current pregnant or planning on getting pregnant? No plans no chance, age 52 and they are testing hormones   Have you ever had any Brain imaging? yes     What medications do you take or have you taken for your headaches? ABORTIVE/pertinent p r n   Meds:      Sumatriptan 75 mg - helps but slowly and causes Side effects - woozy, super fatigue, heavy feeling and has to lay down     past  Meloxicam/Mobic 50 mg for shoulder  Methocarbamol for shoulder   Prednisone ok no issues    PREVENTIVE/pertinent daily meds:   -      Past/ failed/contraindicated:  antihypertensive would be contraindicated due to history of hypotension positional lightheadedness  Sertraline SE       LIFESTYLE  Sleep   - averages: broken, 7-7 5 hours a night  Problems falling asleep?:   Falls asleep around 11  Problems staying asleep?:  Yes, a couple times to pee and usually falls back asleep   Never had a sleep study  Does not feel like RLS to her, more brain  Snores on her back     Physical activity: daily runs and hikes or trail runs and paddle board in summer     Water: 8 bottles per day  Caffeine: 6 oz  per day    Mood: adjustment reaction with anxiety and depression, in counseling     The following portions of the patient's history were reviewed and updated as appropriate: allergies, current medications, past family history, past medical history, past social history, past surgical history and problem list     Pertinent family history:  Family history of headaches:  migraine headaches in mother and sister  Any family history of aneurysms - No    Pertinent social history:  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 [employer]   Education: masters in [employer] with cats - 2       Past Medical History:     Past Medical History:   Diagnosis Date   • Abnormal Pap smear of cervix    • Cluster headache    • Colon polyp    • Disease of thyroid gland    • GERD (gastroesophageal reflux disease)    • Headache, tension-type    • HL (hearing loss)     Sudden hearing loss hearing mostly came back   • Migraine        Patient Active Problem List   Diagnosis   • Hypothyroidism   • Abnormal MRI of head   • Hearing loss   • Headache, variant migraine   • Nasal septal deviation   • Gastroesophageal reflux disease   • Bee allergy status   • Colon polyp   • History of sudden hearing loss   • Strain of left groin   • Dyslipidemia   • Adjustment reaction with anxiety and depression       Medications:      Current Outpatient Medications   Medication Sig Dispense Refill   • EPINEPHrine (EPIPEN) 0 3 mg/0 3 mL SOAJ INJECT 0 3 ML (0 3 MG TOTAL) INTO A MUSCLE AS NEEDED FOR ANAPHYLAXIS 2 each 1   • famotidine (PEPCID) 40 MG tablet TAKE 1 TABLET BY MOUTH EVERY  tablet 1   • levothyroxine 75 mcg tablet Take 1 tablet (75 mcg total) by mouth daily 90 tablet 3   • Lo Loestrin Fe 1 MG-10 MCG / 10 MCG TABS Take 1 tablet by mouth daily 84 tablet 4   • Loratadine 10 MG CAPS Take by mouth daily as needed     • multivitamin (THERAGRAN) TABS Take 1 tablet by mouth daily     • rizatriptan (MAXALT) 10 mg tablet Take 1 tablet (10 mg total) by mouth once as needed for migraine May repeat in 2 hours if needed  Max 2/24 hours, 9/month  9 tablet 12   • lansoprazole (PREVACID) 30 mg capsule TAKE 1 CAPSULE BY MOUTH DAILY BEFORE BREAKFAST  (Patient not taking: Reported on 3/27/2023) 90 capsule 2     No current facility-administered medications for this visit  Allergies:       Allergies   Allergen Reactions   • Codeine      Other reaction(s): Palpitations  Reaction Date: 03Aug2011;    • Molds & Smuts    • Wound Dressing Adhesive Rash     Redness         Family History:     Family History   Problem Relation Age of Onset   • Hypothyroidism Mother    • Migraines Mother    • Hyperlipidemia Father         Pure   • Stomach cancer Paternal Grandmother 80   • Cancer Paternal Grandmother         Pancreatic at 80   • Ovarian cancer Maternal Grandmother 48        over age 48   • Cancer Maternal Grandmother         Ovarian at 68   • No Known Problems Sister    • Lymphoma Maternal Grandfather 80   • No Known Problems Paternal Grandfather    • Prostate cancer Maternal Uncle    • Prostate cancer Paternal Uncle        Social History:       Social History     Socioeconomic History   • Marital status:      Spouse name: Not on file   • Number of children: Not on file   • Years of education: Not on file   • Highest education level: Not on file   Occupational History   • Not on file   Tobacco Use   • Smoking status: Never   • Smokeless tobacco: Never   Vaping Use   • Vaping Use: Never used   Substance and Sexual Activity   • Alcohol use: Not Currently     Comment: Social    • Drug use: Yes     Comment: CBD gummies   • Sexual activity: Not Currently     Partners: Male     Birth control/protection: Other     Comment: Birth Control loloestrin   Other Topics Concern   • Not on file   Social History Narrative    Caffeine use     Social Determinants of Health     Financial Resource Strain: Not on file   Food Insecurity: Not on file   Transportation Needs: Not on file Physical Activity: Not on file   Stress: Not on file   Social Connections: Not on file   Intimate Partner Violence: Not on file   Housing Stability: Not on file         Objective:     Physical Exam:                                                                 Vitals:            Constitutional:    /62 (BP Location: Left arm, Patient Position: Sitting, Cuff Size: Adult)   Pulse 78   Temp 97 6 °F (36 4 °C) (Temporal)   Wt 47 3 kg (104 lb 3 2 oz)   BMI 19 69 kg/m²   BP Readings from Last 3 Encounters:   03/27/23 106/62   02/06/23 118/75   01/13/23 106/62     Pulse Readings from Last 3 Encounters:   03/27/23 78   02/06/23 83   01/13/23 81         Well developed, well nourished, well groomed  Psychiatric:  Normal behavior and appropriate affect        Neurological Examination:     Mental status/cognitive function:   Recent and remote memory appear intact  Attention span and concentration as well as fund of knowledge are appropriate for age  Normal language and spontaneous speech  Cranial Nerves:   VII-facial expression symmetric  Motor Exam: symmetric bulk throughout  no atrophy, fasciculations or abnormal movements noted  Coordination:  no apparent dysmetria, ataxia or tremor noted  Gait: steady casual gait     Pertinent lab results:   -6/13/2022 TSH normal  CMP unremarkable  CBC unremarkable except for  with normal hemoglobin 12 6  5/5/2021 vitamin D 25     Imaging:   - MRI brain with and without contrast 2/3/2020: Stable MRI of the brain with a few scattered nonspecific white matter foci in the bifrontal subcortical white matter potentially related, gated migraine headaches  Other etiologies not excluded  Otherwise, normal MRI of the brain   - MRI brain with without contrast 2/1/2017: No acute disease  Stable MR appearance of the brain  Nonspecific white matter changes right greater than left frontal lobes are stable  Query complicated migraine      Please see EMR for past brain imaging or spinal imaging       Review of Systems:   ROS obtained by medical assistant Personally reviewed and updated if indicated  I recommended PCP follow up for non neurologic problems  Review of Systems   Constitutional: Negative  Negative for appetite change and fever  HENT: Negative  Negative for hearing loss, tinnitus, trouble swallowing and voice change  Eyes: Negative  Negative for photophobia, pain and visual disturbance  Respiratory: Negative  Negative for shortness of breath  Cardiovascular: Negative  Negative for palpitations  Gastrointestinal: Negative  Negative for nausea and vomiting  Endocrine: Negative  Negative for cold intolerance  Genitourinary: Negative  Negative for dysuria, frequency and urgency  Musculoskeletal: Negative  Negative for gait problem, myalgias and neck pain  Skin: Negative  Negative for rash  Allergic/Immunologic: Negative  Neurological: Negative  Negative for dizziness, tremors, seizures, syncope, facial asymmetry, speech difficulty, weakness, light-headedness, numbness and headaches  Hematological: Negative  Does not bruise/bleed easily  Psychiatric/Behavioral: Negative  Negative for confusion, hallucinations and sleep disturbance  All other systems reviewed and are negative  I have spent 52 minutes with Patient today in which greater than 50% of this time was spent in counseling/coordination of care regarding Prognosis, Risks and benefits of tx options, Instructions for management, Patient and family education, Importance of tx compliance, Risk factor reductions, Impressions, Counseling / Coordination of care, Documenting in the medical record, Reviewing / ordering tests, medicine, procedures   and Obtaining or reviewing history    I also spent 24 minutes non face to face for this patient the same day           Author:  Domonique Woods MD 3/27/2023 5:58 PM

## 2023-04-10 DIAGNOSIS — G43.009 MIGRAINE WITHOUT AURA AND WITHOUT STATUS MIGRAINOSUS, NOT INTRACTABLE: Primary | ICD-10-CM

## 2023-04-10 RX ORDER — RIMEGEPANT SULFATE 75 MG/75MG
TABLET, ORALLY DISINTEGRATING ORAL
Qty: 8 TABLET | Refills: 11 | Status: SHIPPED | OUTPATIENT
Start: 2023-04-10

## 2023-06-20 ENCOUNTER — OFFICE VISIT (OUTPATIENT)
Dept: FAMILY MEDICINE CLINIC | Facility: CLINIC | Age: 48
End: 2023-06-20
Payer: COMMERCIAL

## 2023-06-20 VITALS
BODY MASS INDEX: 19.52 KG/M2 | WEIGHT: 103.4 LBS | HEART RATE: 72 BPM | SYSTOLIC BLOOD PRESSURE: 98 MMHG | DIASTOLIC BLOOD PRESSURE: 64 MMHG | HEIGHT: 61 IN | RESPIRATION RATE: 16 BRPM | OXYGEN SATURATION: 100 % | TEMPERATURE: 98 F

## 2023-06-20 DIAGNOSIS — Z00.00 ENCOUNTER FOR WELLNESS EXAMINATION IN ADULT: Primary | ICD-10-CM

## 2023-06-20 DIAGNOSIS — E55.9 VITAMIN D DEFICIENCY: ICD-10-CM

## 2023-06-20 DIAGNOSIS — K21.9 GASTROESOPHAGEAL REFLUX DISEASE WITHOUT ESOPHAGITIS: Chronic | ICD-10-CM

## 2023-06-20 DIAGNOSIS — G43.809 HEADACHE, VARIANT MIGRAINE: ICD-10-CM

## 2023-06-20 DIAGNOSIS — D12.4 ADENOMATOUS POLYP OF DESCENDING COLON: Chronic | ICD-10-CM

## 2023-06-20 DIAGNOSIS — E78.5 DYSLIPIDEMIA: ICD-10-CM

## 2023-06-20 DIAGNOSIS — E03.9 HYPOTHYROIDISM, UNSPECIFIED TYPE: ICD-10-CM

## 2023-06-20 DIAGNOSIS — E53.8 VITAMIN B12 DEFICIENCY: ICD-10-CM

## 2023-06-20 PROCEDURE — 99396 PREV VISIT EST AGE 40-64: CPT | Performed by: FAMILY MEDICINE

## 2023-06-20 RX ORDER — METHOCARBAMOL 750 MG/1
TABLET, FILM COATED ORAL
COMMUNITY
End: 2023-06-20 | Stop reason: ALTCHOICE

## 2023-06-20 RX ORDER — PANTOPRAZOLE SODIUM 40 MG/1
40 TABLET, DELAYED RELEASE ORAL DAILY
Qty: 90 TABLET | Refills: 1 | Status: SHIPPED | OUTPATIENT
Start: 2023-06-20

## 2023-06-20 RX ORDER — FLUOROURACIL 50 MG/G
CREAM TOPICAL
COMMUNITY
End: 2023-06-20 | Stop reason: ALTCHOICE

## 2023-06-20 RX ORDER — PANTOPRAZOLE SODIUM 40 MG/1
TABLET, DELAYED RELEASE ORAL
COMMUNITY
End: 2023-06-20 | Stop reason: SDUPTHER

## 2023-06-20 RX ORDER — RIZATRIPTAN BENZOATE 10 MG/1
TABLET ORAL
COMMUNITY
End: 2023-06-20 | Stop reason: ALTCHOICE

## 2023-06-20 NOTE — PROGRESS NOTES
Name: Yoni Muhammad      : 1975      MRN: [id number]  Encounter Provider: Patricia De Leon MD  Encounter Date: 2023   Encounter department: [address] [place] [address]      1  Encounter for wellness examination in adult  Comments:  Patient is up-to-date with health screenings    2  Gastroesophageal reflux disease without esophagitis  Assessment & Plan:  EGD 2020, small hiatal hernia  Symptoms improved on Protonix, patient may reduce dose to PRN  Lack of improvement with Pepcid  Patient is under care of [facility]    Orders:  -     pantoprazole (PROTONIX) 40 mg tablet; Take 1 tablet (40 mg total) by mouth daily    3  Headache, variant migraine  Assessment & Plan:  [facility] works well    Orders:  -     Magnesium; Future    4  Hypothyroidism, unspecified type  Assessment & Plan:  Levothyroxine 75 mcg    Orders:  -     TSH, 3rd generation; Future    5  Dyslipidemia  -     CBC and differential; Future  -     Comprehensive metabolic panel; Future  -     Lipid Panel with Direct LDL reflex; Future    6  Adenomatous polyp of descending colon  Assessment & Plan:  Due for colonoscopy       7  Vitamin D deficiency  -     Vitamin D 25 hydroxy; Future    8  Vitamin B12 deficiency  -     Vitamin B12; Future      Patient Instructions   Please take pantoprazole on an as-needed basis for acid reflux disease  If you believe medication is causing side effects or ineffective-please message me and we will try an alternative  Magnesium oxide 500 mg daily  CoQ 100 - 200 mg daily         Subjective     Annual well exam  GERD flare in  ,seen by GI,improved now  Previous EGD - c/w HH  Pepcid did not help  Symptoms are well controlled on Protonix  Patient follows healthy diet      No further routine f/up with ENT-hearing test was reassuring last time   Lum -GYN  UTD with mammo  Up-to-date with colonoscopy  No meat, no dairy  Occasional fish  MVI daily  Patient overall has been feeling well  Review of Systems   Constitutional: Negative  HENT: Negative  Eyes: Negative  Respiratory: Negative  Cardiovascular: Negative  Gastrointestinal: Negative  Endocrine: Negative  Genitourinary: Negative  Musculoskeletal: Negative  Skin: Negative  Allergic/Immunologic: Negative  Neurological: Negative  Hematological: Negative  Psychiatric/Behavioral: Negative          Past Medical History:   Diagnosis Date   • Abnormal Pap smear of cervix    • Allergic    • Cluster headache    • Colon polyp    • Disease of thyroid gland    • GERD (gastroesophageal reflux disease)    • Headache(784 0)     Migraine   • Headache, tension-type    • HL (hearing loss)     Sudden hearing loss hearing mostly came back   • Migraine      Past Surgical History:   Procedure Laterality Date   • ANKLE SURGERY Left 03/2020   • ANTERIOR CRUCIATE LIGAMENT REPAIR Right 2007   • COLONOSCOPY     • FL INJECTION LEFT HIP (ARTHROGRAM)  08/23/2021   • HIP SURGERY Left 2014    nerve entrapment release   • TENDON REPAIR  04/06    ACL Repair   • WRIST SURGERY Right 03/2009    Dr Drucilla Klinefelter   • WRIST SURGERY Right 02/07/2020     Family History   Problem Relation Age of Onset   • Hypothyroidism Mother    • Migraines Mother    • Thyroid disease Mother    • Hyperlipidemia Father         Pure   • Stomach cancer Paternal Grandmother 80   • Cancer Paternal Grandmother         Pancreatic at 80   • Ovarian cancer Maternal Grandmother 48        over age 48   • Cancer Maternal Grandmother         Ovarian at 68   • Anxiety disorder Sister    • Lymphoma Maternal Grandfather 80   • No Known Problems Paternal Grandfather    • Prostate cancer Maternal Uncle    • Prostate cancer Paternal Uncle      Social History     Socioeconomic History   • Marital status:      Spouse name: None   • Number of children: None   • Years of education: None   • Highest education level: None   Occupational "History   • None   Tobacco Use   • Smoking status: Never   • Smokeless tobacco: Never   Vaping Use   • Vaping Use: Never used   Substance and Sexual Activity   • Alcohol use: Not Currently     Comment: Social    • Drug use: Yes     Comment: CBD gummies   • Sexual activity: Not Currently     Partners: Male     Birth control/protection: Other     Comment: Birth Control loloestrin   Other Topics Concern   • None   Social History Narrative    Caffeine use     Social Determinants of Health     Financial Resource Strain: Not on file   Food Insecurity: Not on file   Transportation Needs: Not on file   Physical Activity: Not on file   Stress: Not on file   Social Connections: Not on file   Intimate Partner Violence: Not on file   Housing Stability: Not on file     Current Outpatient Medications on File Prior to Visit   Medication Sig   • EPINEPHrine (EPIPEN) 0 3 mg/0 3 mL SOAJ INJECT 0 3 ML (0 3 MG TOTAL) INTO A MUSCLE AS NEEDED FOR ANAPHYLAXIS   • levothyroxine 75 mcg tablet Take 1 tablet (75 mcg total) by mouth daily   • Lo Loestrin Fe 1 MG-10 MCG / 10 MCG TABS Take 1 tablet by mouth daily   • multivitamin (THERAGRAN) TABS Take 1 tablet by mouth daily   • rimegepant sulfate (Nurtec) 75 mg TBDP Take one NURTEC 75 mg at onset under tongue  Limit 1 in 24 hours       Allergies   Allergen Reactions   • Codeine      Other reaction(s): Palpitations  Reaction Date: 03Aug2011;    • Molds & Smuts    • Wound Dressing Adhesive Rash     Redness       Immunization History   Administered Date(s) Administered   • COVID-19 PFIZER VACCINE 0 3 ML IM 03/29/2021, 04/19/2021, 11/19/2021   • INFLUENZA 10/12/2022   • Influenza, injectable, quadrivalent, preservative free 0 5 mL 11/16/2020   • Tdap 09/18/2013, 12/15/2015       Objective     BP 98/64 (BP Location: Left arm, Patient Position: Sitting, Cuff Size: Standard)   Pulse 72   Temp 98 °F (36 7 °C) (Temporal)   Resp 16   Ht 5' 1\" (1 549 m)   Wt 46 9 kg (103 lb 6 4 oz)   LMP  (LMP " Unknown)   SpO2 100%   BMI 19 54 kg/m²     Physical Exam  Vitals and nursing note reviewed  Constitutional:       General: She is not in acute distress  Appearance: Normal appearance  She is well-developed  She is not ill-appearing  HENT:      Head: Normocephalic and atraumatic  Eyes:      Conjunctiva/sclera: Conjunctivae normal    Neck:      Thyroid: No thyromegaly  Vascular: No carotid bruit  Cardiovascular:      Rate and Rhythm: Normal rate and regular rhythm  Heart sounds: Normal heart sounds  No murmur heard  Pulmonary:      Effort: Pulmonary effort is normal  No respiratory distress  Breath sounds: Normal breath sounds  No wheezing  Abdominal:      General: Bowel sounds are normal  There is no distension or abdominal bruit  Palpations: Abdomen is soft  Tenderness: There is no abdominal tenderness  Hernia: No hernia is present  Musculoskeletal:         General: Normal range of motion  Cervical back: Neck supple  No rigidity  Right lower leg: No edema  Left lower leg: No edema  Skin:     General: Skin is warm  Neurological:      General: No focal deficit present  Mental Status: She is alert and oriented to person, place, and time  Cranial Nerves: No cranial nerve deficit  Coordination: Coordination normal    Psychiatric:         Mood and Affect: Mood normal          Behavior: Behavior normal          Thought Content:  Thought content normal        Nanette Jackson MD

## 2023-06-20 NOTE — PATIENT INSTRUCTIONS
Please take pantoprazole on an as-needed basis for acid reflux disease    If you believe medication is causing side effects or ineffective-please message me and we will try an alternative  Magnesium oxide 500 mg daily  CoQ 100 - 200 mg daily

## 2023-06-24 NOTE — ASSESSMENT & PLAN NOTE
EGD September 2020, small hiatal hernia  Symptoms improved on Protonix, patient may reduce dose to PRN  Lack of improvement with Pepcid    Patient is under care of [facility]

## 2023-07-04 DIAGNOSIS — Z30.41 ENCOUNTER FOR SURVEILLANCE OF CONTRACEPTIVE PILLS: ICD-10-CM

## 2023-07-06 RX ORDER — NORETHINDRONE ACETATE AND ETHINYL ESTRADIOL, ETHINYL ESTRADIOL AND FERROUS FUMARATE 1MG-10(24)
KIT ORAL
Qty: 84 TABLET | Refills: 4 | Status: SHIPPED | OUTPATIENT
Start: 2023-07-06

## 2023-07-18 ENCOUNTER — APPOINTMENT (OUTPATIENT)
Dept: LAB | Facility: CLINIC | Age: 48
End: 2023-07-18
Payer: COMMERCIAL

## 2023-07-18 DIAGNOSIS — E53.8 VITAMIN B12 DEFICIENCY: ICD-10-CM

## 2023-07-18 DIAGNOSIS — E78.5 DYSLIPIDEMIA: ICD-10-CM

## 2023-07-18 DIAGNOSIS — E03.9 HYPOTHYROIDISM, UNSPECIFIED TYPE: ICD-10-CM

## 2023-07-18 DIAGNOSIS — E55.9 VITAMIN D DEFICIENCY: ICD-10-CM

## 2023-07-18 DIAGNOSIS — G43.809 HEADACHE, VARIANT MIGRAINE: ICD-10-CM

## 2023-07-18 LAB
25(OH)D3 SERPL-MCNC: 51.5 NG/ML (ref 30–100)
ALBUMIN SERPL BCP-MCNC: 3.7 G/DL (ref 3.5–5)
ALP SERPL-CCNC: 53 U/L (ref 46–116)
ALT SERPL W P-5'-P-CCNC: 19 U/L (ref 12–78)
ANION GAP SERPL CALCULATED.3IONS-SCNC: 3 MMOL/L
AST SERPL W P-5'-P-CCNC: 17 U/L (ref 5–45)
BASOPHILS # BLD AUTO: 0.04 THOUSANDS/ÂΜL (ref 0–0.1)
BASOPHILS NFR BLD AUTO: 1 % (ref 0–1)
BILIRUB SERPL-MCNC: 0.52 MG/DL (ref 0.2–1)
BUN SERPL-MCNC: 10 MG/DL (ref 5–25)
CALCIUM SERPL-MCNC: 9.4 MG/DL (ref 8.3–10.1)
CHLORIDE SERPL-SCNC: 109 MMOL/L (ref 96–108)
CHOLEST SERPL-MCNC: 198 MG/DL
CO2 SERPL-SCNC: 25 MMOL/L (ref 21–32)
CREAT SERPL-MCNC: 0.84 MG/DL (ref 0.6–1.3)
EOSINOPHIL # BLD AUTO: 0.03 THOUSAND/ÂΜL (ref 0–0.61)
EOSINOPHIL NFR BLD AUTO: 1 % (ref 0–6)
ERYTHROCYTE [DISTWIDTH] IN BLOOD BY AUTOMATED COUNT: 12.1 % (ref 11.6–15.1)
GFR SERPL CREATININE-BSD FRML MDRD: 83 ML/MIN/1.73SQ M
GLUCOSE P FAST SERPL-MCNC: 86 MG/DL (ref 65–99)
HCT VFR BLD AUTO: 42.5 % (ref 34.8–46.1)
HDLC SERPL-MCNC: 62 MG/DL
HGB BLD-MCNC: 13.7 G/DL (ref 11.5–15.4)
IMM GRANULOCYTES # BLD AUTO: 0.02 THOUSAND/UL (ref 0–0.2)
IMM GRANULOCYTES NFR BLD AUTO: 0 % (ref 0–2)
LDLC SERPL CALC-MCNC: 121 MG/DL (ref 0–100)
LYMPHOCYTES # BLD AUTO: 2.05 THOUSANDS/ÂΜL (ref 0.6–4.47)
LYMPHOCYTES NFR BLD AUTO: 40 % (ref 14–44)
MAGNESIUM SERPL-MCNC: 2.5 MG/DL (ref 1.6–2.6)
MCH RBC QN AUTO: 31.8 PG (ref 26.8–34.3)
MCHC RBC AUTO-ENTMCNC: 32.2 G/DL (ref 31.4–37.4)
MCV RBC AUTO: 99 FL (ref 82–98)
MONOCYTES # BLD AUTO: 0.44 THOUSAND/ÂΜL (ref 0.17–1.22)
MONOCYTES NFR BLD AUTO: 9 % (ref 4–12)
NEUTROPHILS # BLD AUTO: 2.55 THOUSANDS/ÂΜL (ref 1.85–7.62)
NEUTS SEG NFR BLD AUTO: 49 % (ref 43–75)
NRBC BLD AUTO-RTO: 0 /100 WBCS
PLATELET # BLD AUTO: 252 THOUSANDS/UL (ref 149–390)
PMV BLD AUTO: 13.4 FL (ref 8.9–12.7)
POTASSIUM SERPL-SCNC: 4.1 MMOL/L (ref 3.5–5.3)
PROT SERPL-MCNC: 7.3 G/DL (ref 6.4–8.4)
RBC # BLD AUTO: 4.31 MILLION/UL (ref 3.81–5.12)
SODIUM SERPL-SCNC: 137 MMOL/L (ref 135–147)
TRIGL SERPL-MCNC: 77 MG/DL
TSH SERPL DL<=0.05 MIU/L-ACNC: 2.13 UIU/ML (ref 0.45–4.5)
VIT B12 SERPL-MCNC: 678 PG/ML (ref 180–914)
WBC # BLD AUTO: 5.13 THOUSAND/UL (ref 4.31–10.16)

## 2023-07-18 PROCEDURE — 82607 VITAMIN B-12: CPT

## 2023-07-18 PROCEDURE — 80053 COMPREHEN METABOLIC PANEL: CPT

## 2023-07-18 PROCEDURE — 36415 COLL VENOUS BLD VENIPUNCTURE: CPT

## 2023-07-18 PROCEDURE — 85025 COMPLETE CBC W/AUTO DIFF WBC: CPT

## 2023-07-18 PROCEDURE — 80061 LIPID PANEL: CPT

## 2023-07-18 PROCEDURE — 82306 VITAMIN D 25 HYDROXY: CPT

## 2023-07-18 PROCEDURE — 84443 ASSAY THYROID STIM HORMONE: CPT

## 2023-07-18 PROCEDURE — 83735 ASSAY OF MAGNESIUM: CPT

## 2023-07-24 DIAGNOSIS — E03.9 HYPOTHYROIDISM, UNSPECIFIED TYPE: ICD-10-CM

## 2023-07-24 RX ORDER — LEVOTHYROXINE SODIUM 0.07 MG/1
75 TABLET ORAL DAILY
Qty: 90 TABLET | Refills: 3 | Status: SHIPPED | OUTPATIENT
Start: 2023-07-24

## 2023-08-17 ENCOUNTER — ANNUAL EXAM (OUTPATIENT)
Dept: OBGYN CLINIC | Facility: CLINIC | Age: 48
End: 2023-08-17
Payer: COMMERCIAL

## 2023-08-17 VITALS
BODY MASS INDEX: 18.88 KG/M2 | DIASTOLIC BLOOD PRESSURE: 58 MMHG | SYSTOLIC BLOOD PRESSURE: 100 MMHG | HEIGHT: 61 IN | WEIGHT: 100 LBS

## 2023-08-17 DIAGNOSIS — Z01.419 WELL WOMAN EXAM WITH ROUTINE GYNECOLOGICAL EXAM: Primary | ICD-10-CM

## 2023-08-17 DIAGNOSIS — Z12.31 ENCOUNTER FOR SCREENING MAMMOGRAM FOR MALIGNANT NEOPLASM OF BREAST: ICD-10-CM

## 2023-08-17 PROCEDURE — 99396 PREV VISIT EST AGE 40-64: CPT | Performed by: OBSTETRICS & GYNECOLOGY

## 2023-08-17 RX ORDER — CITALOPRAM HYDROBROMIDE 10 MG/1
1 TABLET ORAL DAILY
COMMUNITY

## 2023-08-17 RX ORDER — ESOMEPRAZOLE MAGNESIUM 40 MG/1
CAPSULE, DELAYED RELEASE ORAL
COMMUNITY

## 2023-08-17 NOTE — PROGRESS NOTES
ASSESSMENT & PLAN:   Diagnoses and all orders for this visit:    Well woman exam with routine gynecological exam    Encounter for screening mammogram for malignant neoplasm of breast    Other orders  -     esomeprazole (NexIUM) 40 MG capsule  -     citalopram (CeleXA) 10 mg tablet; Take 1 tablet by mouth daily        The following were reviewed in today's visit: ASCCP guidelines, Gardisil vaccination, STD testing breast self exam, mammography screening ordered, menopause, exercise, healthy diet and colonoscopy discussed and ordered. Patient to return to office in yearly for annual exam.     All questions have been answered to her satisfaction. CC:  Annual Gynecologic Examination  Chief Complaint   Patient presents with   • Gynecologic Exam     Patient is here today for her yearly exam, pap up to date(22-wnl), mammo 10/24/22-scheduled 10/26/23, colonoscopy 10/4/21-repeat 3 years. Patient states she is doing well, she has no concerns at this time. HPI: Julian Oseguera is a 52 y.o. Marmikene Colt who presents for annual gynecologic examination. She has the following concerns:  None. Doing an 6 K race in [place] in September. Continues to take her OCPs continuously. Denies any breakthrough bleeding. Health Maintenance:    Exercise: frequently  Breast exams/breast awareness: yes  Diet: well balanced diet  Last mammogram:  - BIRADS 1  Colorectal cancer screenin - repeat in 3 years.  Due       Past Medical History:   Diagnosis Date   • Abnormal Pap smear of cervix    • Allergic    • Cluster headache    • Colon polyp    • Disease of thyroid gland    • GERD (gastroesophageal reflux disease)    • Headache(784.0)     Migraine   • Headache, tension-type    • HL (hearing loss)     Sudden hearing loss hearing mostly came back   • Hypothyroidism    • Migraine        Past Surgical History:   Procedure Laterality Date   • ANKLE SURGERY Left 2020   • ANTERIOR CRUCIATE LIGAMENT REPAIR Right 2007   • COLONOSCOPY     • FL INJECTION LEFT HIP (ARTHROGRAM)  08/23/2021   • HIP SURGERY Left 2014    nerve entrapment release   • TENDON REPAIR  04/06    ACL Repair   • WRIST SURGERY Right 03/2009    Dr Kirti Rushing   • WRIST SURGERY Right 02/07/2020       Past OB/Gyn History:   No LMP recorded (lmp unknown). (Menstrual status: Birth Control). Menopausal status: premenopausal  Menopausal symptoms: None    Last Pap: 2022 : no abnormalities  History of abnormal Pap smear: yes - LEEP about 16 years ago. Normal paps since. Patient is not currently sexually active.    STD testing: no  Current contraception: OCP (estrogen/progesterone)      Family History  Family History   Problem Relation Age of Onset   • Hypothyroidism Mother    • Migraines Mother    • Thyroid disease Mother    • Transient ischemic attack Mother    • Hyperlipidemia Father         Pure   • Stomach cancer Paternal Grandmother 80   • Cancer Paternal Grandmother         Pancreatic at 80   • Ovarian cancer Maternal Grandmother         Age 68   • Cancer Maternal Grandmother         Ovarian at 68   • Anxiety disorder Sister    • Lymphoma Maternal Grandfather 80   • No Known Problems Paternal Grandfather    • Prostate cancer Maternal Uncle    • Prostate cancer Paternal Uncle        Family history of uterine or ovarian cancer: yes  Family history of breast cancer: no  Family history of colon cancer: no    Social History:  Social History     Socioeconomic History   • Marital status:      Spouse name: Not on file   • Number of children: Not on file   • Years of education: Not on file   • Highest education level: Not on file   Occupational History   • Not on file   Tobacco Use   • Smoking status: Never   • Smokeless tobacco: Never   Vaping Use   • Vaping Use: Never used   Substance and Sexual Activity   • Alcohol use: Not Currently     Comment: Social    • Drug use: Yes     Comment: CBD gummies   • Sexual activity: Not Currently     Partners: Male Birth control/protection: Other     Comment: Birth Control loloestrin   Other Topics Concern   • Not on file   Social History Narrative    Caffeine use     Social Determinants of Health     Financial Resource Strain: Not on file   Food Insecurity: Not on file   Transportation Needs: Not on file   Physical Activity: Not on file   Stress: Not on file   Social Connections: Not on file   Intimate Partner Violence: Not on file   Housing Stability: Not on file     Domestic violence screen: negative    Allergies: Allergies   Allergen Reactions   • Codeine      Other reaction(s): Palpitations  Reaction Date: 03Aug2011;    • Molds & Smuts    • Wound Dressing Adhesive Rash     Redness         Medications:    Current Outpatient Medications:   •  EPINEPHrine (EPIPEN) 0.3 mg/0.3 mL SOAJ, INJECT 0.3 ML (0.3 MG TOTAL) INTO A MUSCLE AS NEEDED FOR ANAPHYLAXIS, Disp: 2 each, Rfl: 1  •  esomeprazole (NexIUM) 40 MG capsule, , Disp: , Rfl:   •  levothyroxine 75 mcg tablet, TAKE 1 TABLET BY MOUTH EVERY DAY, Disp: 90 tablet, Rfl: 3  •  Lo Loestrin Fe 1 MG-10 MCG / 10 MCG TABS, TAKE 1 TABLET BY MOUTH EVERY DAY, Disp: 84 tablet, Rfl: 4  •  multivitamin (THERAGRAN) TABS, Take 1 tablet by mouth daily, Disp: , Rfl:   •  rimegepant sulfate (Nurtec) 75 mg TBDP, Take one NURTEC 75 mg at onset under tongue. Limit 1 in 24 hours. , Disp: 8 tablet, Rfl: 11  •  citalopram (CeleXA) 10 mg tablet, Take 1 tablet by mouth daily, Disp: , Rfl:   •  pantoprazole (PROTONIX) 40 mg tablet, Take 1 tablet (40 mg total) by mouth daily (Patient not taking: Reported on 8/17/2023), Disp: 90 tablet, Rfl: 1    Review of Systems:  Review of Systems   Constitutional: Negative for activity change, appetite change and unexpected weight change. Respiratory: Negative for cough and shortness of breath. Cardiovascular: Negative for chest pain. Gastrointestinal: Negative for abdominal pain, constipation, diarrhea, nausea and vomiting.    Genitourinary: Negative for difficulty urinating, dyspareunia, frequency, menstrual problem, pelvic pain, urgency, vaginal bleeding, vaginal discharge and vaginal pain. Musculoskeletal: Negative for back pain. Skin: Negative. Neurological: Negative for dizziness, weakness, light-headedness and headaches. Psychiatric/Behavioral: Negative. Physical Exam:  /58 (BP Location: Left arm, Patient Position: Sitting, Cuff Size: Standard)   Ht 5' 1" (1.549 m)   Wt 45.4 kg (100 lb)   LMP  (LMP Unknown)   BMI 18.89 kg/m²    Physical Exam  Constitutional:       General: She is not in acute distress. Appearance: Normal appearance. She is well-developed and normal weight. She is not diaphoretic. Genitourinary:      Vulva and bladder normal.      No lesions in the vagina. Genitourinary Comments: Perineum normal in appearance, no lacerations, no ulcerations, no lesions visualized. Right Labia: No rash, tenderness or lesions. Left Labia: No tenderness, lesions or rash. No inguinal adenopathy present in the right or left side. No vaginal discharge, erythema, tenderness or bleeding. No vaginal prolapse present. No vaginal atrophy present. Right Adnexa: not tender, not full and no mass present. Left Adnexa: not tender, not full and no mass present. Cervix is nulliparous. No cervical motion tenderness, discharge, friability, lesion or polyp. No parametrium nodularity or thickening present. Uterus is not enlarged, tender or prolapsed. No uterine mass detected. Uterus is midaxial.      No urethral prolapse or mass present. Bladder is not tender. Pelvic exam was performed with patient in the lithotomy position. Rectum:      No tenderness or external hemorrhoid. Breasts:     Breasts are symmetrical.      Right: No swelling, bleeding, mass, skin change or tenderness. Left: No swelling, bleeding, mass, skin change or tenderness.    HENT:      Head: Normocephalic and atraumatic. Neck:      Thyroid: No thyromegaly or thyroid tenderness. Cardiovascular:      Rate and Rhythm: Normal rate and regular rhythm. Heart sounds: Normal heart sounds. No murmur heard. No friction rub. Pulmonary:      Effort: Pulmonary effort is normal. No respiratory distress. Breath sounds: Normal breath sounds. No wheezing or rales. Abdominal:      Palpations: Abdomen is soft. There is no mass. Tenderness: There is no abdominal tenderness. There is no guarding. Musculoskeletal:         General: No tenderness. Normal range of motion. Right lower leg: No edema. Left lower leg: No edema. Lymphadenopathy:      Lower Body: No right inguinal adenopathy. No left inguinal adenopathy. Neurological:      Mental Status: She is alert and oriented to person, place, and time. Skin:     General: Skin is warm and dry. Coloration: Skin is not pale. Findings: No erythema. Psychiatric:         Mood and Affect: Mood normal.         Behavior: Behavior normal.         Thought Content: Thought content normal.         Judgment: Judgment normal.   Vitals and nursing note reviewed.

## 2023-08-25 ENCOUNTER — OFFICE VISIT (OUTPATIENT)
Dept: FAMILY MEDICINE CLINIC | Facility: CLINIC | Age: 48
End: 2023-08-25
Payer: COMMERCIAL

## 2023-08-25 VITALS
TEMPERATURE: 98 F | HEIGHT: 61 IN | OXYGEN SATURATION: 99 % | DIASTOLIC BLOOD PRESSURE: 70 MMHG | SYSTOLIC BLOOD PRESSURE: 100 MMHG | HEART RATE: 71 BPM | RESPIRATION RATE: 16 BRPM | WEIGHT: 103 LBS | BODY MASS INDEX: 19.45 KG/M2

## 2023-08-25 DIAGNOSIS — S80.861A TICK BITE OF RIGHT LOWER LEG, INITIAL ENCOUNTER: Primary | ICD-10-CM
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
## 2023-08-25 PROCEDURE — 99213 OFFICE O/P EST LOW 20 MIN: CPT | Performed by: NURSE PRACTITIONER

## 2023-08-25 RX ORDER — DOXYCYCLINE HYCLATE 100 MG
100 TABLET ORAL 2 TIMES DAILY
Qty: 28 TABLET | Refills: 0 | Status: SHIPPED | OUTPATIENT
Start: 2023-08-25 | End: 2023-09-08

## 2023-08-25 NOTE — PROGRESS NOTES
FAMILY PRACTICE OFFICE VISIT       NAME: Maeve Delatorre  AGE: 52 y.o. SEX: female       : 1975        MRN: [id number]    Assessment and Plan   1. Tick bite of right lower leg, initial encounter  -     doxycycline hyclate (VIBRA-TABS) 100 mg tablet; Take 1 tablet (100 mg total) by mouth 2 (two) times a day for 14 days       Living in lyme endemic area. Spending a lot of time outdoors. For symptoms will start doxycycline prophylaxis for tick bite 4 weeks ago. She will call if symptoms are not improving on doxycycline. Chief Complaint     Chief Complaint   Patient presents with   • Tick Bite     4 + wks       History of Present Illness     Maeve Delatorre is a 52year old female presenting today for tick bite. 4 weeks ago had a tick bite on left lower leg medial.     This week has neck pain and mild headaches. Found tick she believes in a 1-5 hour window. It was a deer tick. She is outdoors often. Could have been other tick bites she is not aware of. She had no rashes. Has been treated for lyme disease in the past, but has never been tested. No fevers or night sweats, fatigue. Review of Systems   Review of Systems   Constitutional: Negative. Negative for chills, diaphoresis, fatigue and fever. Musculoskeletal: Positive for neck pain. Skin: Negative. Neurological: Positive for headaches. I have reviewed the patient's medical history in detail; there are no changes to the history as noted in the electronic medical record. Objective     Vitals:    23 0803   BP: 100/70   Pulse: 71   Resp: 16   Temp: 98 °F (36.7 °C)   TempSrc: Temporal   SpO2: 99%   Weight: 46.7 kg (103 lb)   Height: 5' 1" (1.549 m)     Wt Readings from Last 3 Encounters:   23 46.7 kg (103 lb)   23 45.4 kg (100 lb)   23 46.9 kg (103 lb 6.4 oz)     Physical Exam  Vitals and nursing note reviewed. Constitutional:       General: She is not in acute distress. Appearance: Normal appearance. She is not ill-appearing. Cardiovascular:      Rate and Rhythm: Normal rate and regular rhythm. Heart sounds: No murmur heard. Pulmonary:      Effort: Pulmonary effort is normal. No respiratory distress. Breath sounds: Normal breath sounds. Neurological:      Mental Status: She is alert. Psychiatric:         Mood and Affect: Mood normal.            ALLERGIES:  Allergies   Allergen Reactions   • Codeine      Other reaction(s): Palpitations  Reaction Date: 03Aug2011;    • Molds & Smuts    • Wound Dressing Adhesive Rash     Redness         Current Medications     Current Outpatient Medications   Medication Sig Dispense Refill   • citalopram (CeleXA) 10 mg tablet Take 1 tablet by mouth daily     • doxycycline hyclate (VIBRA-TABS) 100 mg tablet Take 1 tablet (100 mg total) by mouth 2 (two) times a day for 14 days 28 tablet 0   • EPINEPHrine (EPIPEN) 0.3 mg/0.3 mL SOAJ INJECT 0.3 ML (0.3 MG TOTAL) INTO A MUSCLE AS NEEDED FOR ANAPHYLAXIS 2 each 1   • esomeprazole (NexIUM) 40 MG capsule      • levothyroxine 75 mcg tablet TAKE 1 TABLET BY MOUTH EVERY DAY 90 tablet 3   • Lo Loestrin Fe 1 MG-10 MCG / 10 MCG TABS TAKE 1 TABLET BY MOUTH EVERY DAY 84 tablet 4   • multivitamin (THERAGRAN) TABS Take 1 tablet by mouth daily     • pantoprazole (PROTONIX) 40 mg tablet Take 1 tablet (40 mg total) by mouth daily 90 tablet 1   • rimegepant sulfate (Nurtec) 75 mg TBDP Take one NURTEC 75 mg at onset under tongue. Limit 1 in 24 hours. 8 tablet 11     No current facility-administered medications for this visit.          Health Maintenance     Health Maintenance   Topic Date Due   • Hepatitis C Screening  Never done   • COVID-19 Vaccine (4 - Pfizer series) 01/14/2022   • Influenza Vaccine (1) 09/01/2023   • Breast Cancer Screening: Mammogram  10/24/2023   • BMI: Adult  08/17/2024   • Annual Physical  08/17/2024   • Colorectal Cancer Screening  10/03/2024   • DTaP,Tdap,and Td Vaccines (3 - Td or Tdap) 12/15/2025   • Cervical Cancer Screening  06/23/2027   • HIV Screening  Completed   • Pneumococcal Vaccine: Pediatrics (0 to 5 Years) and At-Risk Patients (6 to 59 Years)  Aged Out   • HIB Vaccine  Aged Out   • IPV Vaccine  Aged Out   • Hepatitis A Vaccine  Aged Out   • Meningococcal ACWY Vaccine  Aged Out   • HPV Vaccine  Aged [employer] History   Administered Date(s) Administered   • COVID-19 PFIZER VACCINE 0.3 ML IM 03/29/2021, 04/19/2021, 11/19/2021   • INFLUENZA 10/12/2022   • Influenza, injectable, quadrivalent, preservative free 0.5 mL 11/16/2020   • Tdap 09/18/2013, 12/15/2015       GRABIEL Fitch

## 2023-09-21 ENCOUNTER — TELEPHONE (OUTPATIENT)
Dept: NEUROLOGY | Facility: CLINIC | Age: 48
End: 2023-09-21

## 2023-09-21 NOTE — TELEPHONE ENCOUNTER
Called patient and left voicemail to confirm their upcoming appointment with Dr. Nico Roy. Informed patient about arriving in the [place] location 15 minutes prior to appointment to get checked in and go over chart.

## 2023-10-26 ENCOUNTER — HOSPITAL ENCOUNTER (OUTPATIENT)
Dept: MAMMOGRAPHY | Facility: HOSPITAL | Age: 48
Discharge: HOME | End: 2023-10-26
Payer: COMMERCIAL

## 2023-10-26 VITALS — BODY MASS INDEX: 19.45 KG/M2 | HEIGHT: 61 IN | WEIGHT: 103 LBS

## 2023-10-26 DIAGNOSIS — Z12.31 ENCOUNTER FOR SCREENING MAMMOGRAM FOR MALIGNANT NEOPLASM OF BREAST: ICD-10-CM

## 2023-10-26 PROCEDURE — 77067 SCR MAMMO BI INCL CAD: CPT

## 2023-10-26 PROCEDURE — 77063 BREAST TOMOSYNTHESIS BI: CPT

## 2023-10-31 ENCOUNTER — TELEPHONE (OUTPATIENT)
Dept: NEUROLOGY | Facility: CLINIC | Age: 48
End: 2023-10-31

## 2023-10-31 ENCOUNTER — PATIENT MESSAGE (OUTPATIENT)
Dept: NEUROLOGY | Facility: CLINIC | Age: 48
End: 2023-10-31

## 2023-10-31 NOTE — TELEPHONE ENCOUNTER
Patient is asking to HAL GARCIA Back to Dr. Jose Ferreira    Patient stated they saw Dr. Jung Mendoza and would prefer to travel the extra distance to see Dr. Jose Ferreira again. Explained that they are not in favor of patients transitioning care  back and forth       Patient asked that I send the HAL GARCIA  anyway    Patient sees Dr. Pb Gregg  for Migraines. Patient called would like to go back to seeing Dr. Jose Ferreira  for the same issue because she feels more comfortable with Dr. Jose Ferreira. Dr. Jung Mendoza, do you agree patient sees Dr Jose Ferreira again ? Dr. Jose Ferreira, do accept HAL GARCIA for this patient?

## 2023-10-31 NOTE — TELEPHONE ENCOUNTER
Pt had originally requested toni to Dr Omega Hamman from me. Unfortunately, I am not a headache specialist. Originally seen for abn mri head. If pt is in need of more advanced headache therapies, toni would not be warranted.

## 2023-11-01 DIAGNOSIS — G43.009 MIGRAINE WITHOUT AURA AND WITHOUT STATUS MIGRAINOSUS, NOT INTRACTABLE: Primary | ICD-10-CM

## 2023-11-03 ENCOUNTER — TELEPHONE (OUTPATIENT)
Dept: NEUROLOGY | Facility: CLINIC | Age: 48
End: 2023-11-03

## 2023-11-03 NOTE — PATIENT COMMUNICATION
November 1, 2023  Burgess Tamar MD   to Neurology [address] Clinical Team 5       11/1/23  6:53 PM  Could you help with [facility] prior authorization please

## 2023-11-03 NOTE — TELEPHONE ENCOUNTER
Frantz COOK completed on [facility]  Urgent request  Key: [id number]     received immediate approval on [facility].   approved from 11/3/23-11/3/24    Approval letter printed to be scanned into chart    Flitto message sent to pt

## 2023-12-04 ENCOUNTER — OFFICE VISIT (OUTPATIENT)
Dept: URGENT CARE | Facility: CLINIC | Age: 48
End: 2023-12-04
Payer: COMMERCIAL

## 2023-12-04 VITALS
TEMPERATURE: 97.9 F | RESPIRATION RATE: 12 BRPM | HEART RATE: 82 BPM | OXYGEN SATURATION: 100 % | WEIGHT: 102 LBS | BODY MASS INDEX: 19.27 KG/M2

## 2023-12-04 DIAGNOSIS — R39.9 UTI SYMPTOMS: ICD-10-CM

## 2023-12-04 DIAGNOSIS — N39.0 URINARY TRACT INFECTION WITHOUT HEMATURIA, SITE UNSPECIFIED: Primary | ICD-10-CM

## 2023-12-04 LAB
SL AMB  POCT GLUCOSE, UA: ABNORMAL
SL AMB LEUKOCYTE ESTERASE,UA: ABNORMAL
SL AMB POCT BILIRUBIN,UA: ABNORMAL
SL AMB POCT BLOOD,UA: ABNORMAL
SL AMB POCT CLARITY,UA: CLEAR
SL AMB POCT COLOR,UA: ABNORMAL
SL AMB POCT KETONES,UA: ABNORMAL
SL AMB POCT NITRITE,UA: ABNORMAL
SL AMB POCT PH,UA: 7
SL AMB POCT SPECIFIC GRAVITY,UA: 1
SL AMB POCT URINE PROTEIN: ABNORMAL
SL AMB POCT UROBILINOGEN: 0.2

## 2023-12-04 PROCEDURE — 81002 URINALYSIS NONAUTO W/O SCOPE: CPT | Performed by: PHYSICIAN ASSISTANT

## 2023-12-04 PROCEDURE — 87086 URINE CULTURE/COLONY COUNT: CPT | Performed by: PHYSICIAN ASSISTANT

## 2023-12-04 PROCEDURE — 99213 OFFICE O/P EST LOW 20 MIN: CPT | Performed by: PHYSICIAN ASSISTANT

## 2023-12-04 RX ORDER — CEPHALEXIN 500 MG/1
500 CAPSULE ORAL EVERY 12 HOURS SCHEDULED
Qty: 14 CAPSULE | Refills: 0 | Status: SHIPPED | OUTPATIENT
Start: 2023-12-04 | End: 2023-12-11

## 2023-12-04 NOTE — PROGRESS NOTES
[facility]        NAME: Linda Sanches is a 52 y.o. female  : 1975    MRN: [id number]  DATE: 2023  TIME: 3:59 PM    Assessment and Plan   Urinary tract infection without hematuria, site unspecified [N39.0]  1. Urinary tract infection without hematuria, site unspecified  cephalexin (KEFLEX) 500 mg capsule      2. UTI symptoms  POCT urine dip    Urine culture            Patient Instructions     Patient Instructions   Your urine dip came back positive. I will send for culture and follow up if the antibiotic needs to be changed. I recommend Pyridium over the counter for discomfort. You can take it for 2 days. I recommend you drink plenty of fluids. Monitor for any worsening symptoms. If you develop worse abdominal pain, back pain, fever/chills, inability to drink liquids or have a decrease in the amount of urine you make go to the Emergency room. Follow up with PCP in 3-5 days. Proceed to  ER if symptoms worsen. Chief Complaint     Chief Complaint   Patient presents with    Possible UTI     Pt presents with urinary burning sensation, started three days ago         History of Present Illness       The patient is a 42-year-old female presenting today for dysuria , slight pain at rest, abd cramping, inc voiding and frequency x 3-4 days. Denies lesions, itching or discharge. Denies hx of UTI's. Denies fevers or chills. Difficulty Urinating   This is a new problem. The current episode started in the past 7 days. The problem occurs intermittently. The problem has been gradually worsening. The quality of the pain is described as burning. The pain is at a severity of 1/10. There has been no fever. The fever has been present for Less than 1 day. She is Not sexually active. There is No history of pyelonephritis. Associated symptoms include frequency and nausea.  Pertinent negatives include no chills, discharge, flank pain, hematuria, hesitancy, possible pregnancy, sweats, urgency or vomiting. Review of Systems   Review of Systems   Constitutional:  Negative for chills and fever. HENT:  Negative for ear pain and sore throat. Eyes:  Negative for pain and visual disturbance. Respiratory:  Negative for cough and shortness of breath. Cardiovascular:  Negative for chest pain and palpitations. Gastrointestinal:  Positive for abdominal pain and nausea. Negative for vomiting. Genitourinary:  Positive for difficulty urinating (incomplete emptying), dysuria and frequency. Negative for flank pain, hematuria, hesitancy and urgency. Musculoskeletal:  Negative for arthralgias and back pain. Skin:  Negative for color change and rash. Neurological:  Negative for seizures and syncope. All other systems reviewed and are negative. Current Medications       Current Outpatient Medications:     cephalexin (KEFLEX) 500 mg capsule, Take 1 capsule (500 mg total) by mouth every 12 (twelve) hours for 7 days, Disp: 14 capsule, Rfl: 0    EPINEPHrine (EPIPEN) 0.3 mg/0.3 mL SOAJ, INJECT 0.3 ML (0.3 MG TOTAL) INTO A MUSCLE AS NEEDED FOR ANAPHYLAXIS, Disp: 2 each, Rfl: 1    esomeprazole (NexIUM) 40 MG capsule, , Disp: , Rfl:     levothyroxine 75 mcg tablet, TAKE 1 TABLET BY MOUTH EVERY DAY, Disp: 90 tablet, Rfl: 3    Lo Loestrin Fe 1 MG-10 MCG / 10 MCG TABS, TAKE 1 TABLET BY MOUTH EVERY DAY, Disp: 84 tablet, Rfl: 4    multivitamin (THERAGRAN) TABS, Take 1 tablet by mouth daily, Disp: , Rfl:     Ubrogepant (UBRELVY) 100 MG tablet, Take 1 tablet (100 mg) one time as needed for migraine. May repeat one additional tablet (100 mg) at least two hours after the first dose.  Do not use more than two doses per day, Disp: 16 tablet, Rfl: 11    citalopram (CeleXA) 10 mg tablet, Take 1 tablet by mouth daily (Patient not taking: Reported on 12/4/2023), Disp: , Rfl:     pantoprazole (PROTONIX) 40 mg tablet, Take 1 tablet (40 mg total) by mouth daily (Patient not taking: Reported on 12/4/2023), Disp: 90 tablet, Rfl: 1    Current Allergies     Allergies as of 12/04/2023 - Reviewed 12/04/2023   Allergen Reaction Noted    Codeine  04/21/2012    Molds & smuts  02/20/2020    Wound dressing adhesive Rash 06/22/2021            The following portions of the patient's history were reviewed and updated as appropriate: allergies, current medications, past family history, past medical history, past social history, past surgical history and problem list.     Past Medical History:   Diagnosis Date    Abnormal Pap smear of cervix     Allergic     Cluster headache     Colon polyp     Disease of thyroid gland     GERD (gastroesophageal reflux disease)     Headache(784.0)     Migraine    Headache, tension-type     HL (hearing loss)     Sudden hearing loss hearing mostly came back    Hypothyroidism     Migraine        Past Surgical History:   Procedure Laterality Date    ANKLE SURGERY Left 03/2020    ANTERIOR CRUCIATE LIGAMENT REPAIR Right 2007    COLONOSCOPY      FL INJECTION LEFT HIP (ARTHROGRAM)  08/23/2021    HIP SURGERY Left 2014    nerve entrapment release    TENDON REPAIR  04/06    ACL Repair    WRIST SURGERY Right 03/2009    Dr Galileo Douglas Right 02/07/2020       Family History   Problem Relation Age of Onset    Hypothyroidism Mother     Migraines Mother     Thyroid disease Mother     Transient ischemic attack Mother     Hyperlipidemia Father         Pure    Stomach cancer Paternal Grandmother 80    Cancer Paternal Grandmother         Pancreatic at 80    Ovarian cancer Maternal Grandmother         Age 68    Cancer Maternal Grandmother         Ovarian at 68    Anxiety disorder Sister     Lymphoma Maternal Grandfather 80    No Known Problems Paternal Grandfather     Prostate cancer Maternal Uncle     Prostate cancer Paternal Uncle          Medications have been verified.         Objective   Pulse 82   Temp 97.9 °F (36.6 °C)   Resp 12   Wt 46.3 kg (102 lb)   SpO2 100%   BMI 19.27 kg/m²        Physical Exam Physical Exam  Vitals and nursing note reviewed. Constitutional:       General: She is not in acute distress. Appearance: Normal appearance. She is well-developed and normal weight. She is not ill-appearing, toxic-appearing or diaphoretic. HENT:      Head: Normocephalic and atraumatic. Right Ear: No drainage, swelling or tenderness. No middle ear effusion. Tympanic membrane is not erythematous. Left Ear: No drainage, swelling or tenderness. No middle ear effusion. Tympanic membrane is not erythematous. Mouth/Throat:      Mouth: Mucous membranes are moist. No oral lesions. Pharynx: Oropharynx is clear. Uvula midline. No pharyngeal swelling, oropharyngeal exudate, posterior oropharyngeal erythema or uvula swelling. Tonsils: No tonsillar exudate or tonsillar abscesses. 0 on the right. 0 on the left. Eyes:      Extraocular Movements: Extraocular movements intact. Right eye: Normal extraocular motion. Left eye: Normal extraocular motion. Conjunctiva/sclera: Conjunctivae normal.      Pupils: Pupils are equal, round, and reactive to light. Cardiovascular:      Rate and Rhythm: Normal rate and regular rhythm. Heart sounds: Normal heart sounds. No murmur heard. No friction rub. No gallop. Pulmonary:      Effort: Pulmonary effort is normal. No respiratory distress. Breath sounds: Normal breath sounds. No stridor. No wheezing, rhonchi or rales. Chest:      Chest wall: No tenderness. Abdominal:      General: Abdomen is flat. Bowel sounds are normal. There is no distension. Palpations: There is no mass. Tenderness: There is no abdominal tenderness. There is no right CVA tenderness, left CVA tenderness, guarding or rebound. Hernia: No hernia is present. Musculoskeletal:         General: Normal range of motion. Skin:     General: Skin is warm and dry. Capillary Refill: Capillary refill takes less than 2 seconds.    Neurological: Mental Status: She is alert.

## 2023-12-05 LAB — BACTERIA UR CULT: NORMAL

## 2023-12-07 ENCOUNTER — OFFICE VISIT (OUTPATIENT)
Dept: GASTROENTEROLOGY | Facility: MEDICAL CENTER | Age: 48
End: 2023-12-07
Payer: COMMERCIAL

## 2023-12-07 ENCOUNTER — TELEPHONE (OUTPATIENT)
Dept: GASTROENTEROLOGY | Facility: MEDICAL CENTER | Age: 48
End: 2023-12-07

## 2023-12-07 VITALS
SYSTOLIC BLOOD PRESSURE: 110 MMHG | TEMPERATURE: 98.5 F | HEART RATE: 66 BPM | BODY MASS INDEX: 19.41 KG/M2 | WEIGHT: 102.8 LBS | HEIGHT: 61 IN | DIASTOLIC BLOOD PRESSURE: 76 MMHG

## 2023-12-07 DIAGNOSIS — R10.13 EPIGASTRIC PRESSURE: ICD-10-CM

## 2023-12-07 DIAGNOSIS — R11.0 NAUSEA: Primary | ICD-10-CM

## 2023-12-07 PROCEDURE — 99213 OFFICE O/P EST LOW 20 MIN: CPT | Performed by: NURSE PRACTITIONER

## 2023-12-07 NOTE — PROGRESS NOTES
[facility] - Outpatient Follow-up Note  Deisy Ramirez 52 y.o. female MRN: [id number]  Encounter: [id number]          ASSESSMENT AND PLAN:      1. Epigastric pain  2. GERD    She has a history of epigastric discomfort, GERD symptoms and underwent EGD in 2020 showing small hiatal hernia. Candida esophagitis for which she was treated with a course of fluconazole. Biopsies were negative for celiac and H. pylori. At last visit pantoprazole 40 mg was added to her 40 mg of famotidine nightly. She had ultrasound of the right upper quadrant which was normal.  She has used famotidine 40 mg daily with relief of symptoms. She does this from time to time for several weeks and then stopped it. She is also had success with Nexium 20 mg daily for several weeks but she does not stay on either medication for more than a month. Denies any nausea, vomiting or dysphagia. She does get epigastric pressure/burning that can radiate to her right chest and right upper back. Can be worse after eating. She has had 2 episodes that approximately 3 hours after meal.  She cannot pinpoint specific food triggers. She rarely drinks caffeine. No NSAID use. Rare alcohol use. She reports this pain started approximately a year ago. Will rule out gallbladder disease by obtaining a gallbladder scan and we will repeat the EGD. -Nuclear medicine gallbladder scan with CCK  -EGD  -Famotidine 40 mg daily  -Antireflux diet  -Follow-up in office after testing      I reviewed with patient/family potential risks of endoscopic evaluation including possible infection, bleeding or perforation. Patient/family verbalized understanding of potential risks and agreed to procedure(s). ______________________________________________________________________    SUBJECTIVE: 19-year-old female here for follow-up. She was last seen by Dr. Canelo De Dios 1/13/2023 for GERD and epigastric discomfort.   She has a history of epigastric discomfort, GERD symptoms and underwent EGD in 2020 showing small hiatal hernia. Candida esophagitis for which she was treated with a course of fluconazole. At last visit pantoprazole 40 mg was added to her 40 mg of famotidine nightly. She had ultrasound of the right upper quadrant which was normal.  She has used famotidine 40 mg daily with relief of symptoms. She does this from time to time for several weeks and then stopped it. She is also had success with Nexium 20 mg daily for several weeks but she does not stay on either medication for more than a month. Denies any nausea, vomiting or dysphagia. She does get epigastric pressure/burning that can radiate to her right chest and right upper back. Can be worse after eating. She has had 2 episodes that approximately 3 hours after meal.  She cannot pinpoint specific food triggers. Labs 7/23-CMP normal, CBC normal other than MCV 99, TSH normal  Prior EGD/colonoscopy     EGD 2020-mild hiatal hernia and Candida esophagitis which was treated with fluconazole. Colonoscopy 2020-20 mm flat cecal polyp just behind the IC valve. It was removed piecemeal and showed sessile serrated adenoma. Repeat colonoscopy was performed 10/21 showing scarring at the polypectomy site and pathology was normal.  Was recommended to repeat colonoscopy in 3 years. REVIEW OF SYSTEMS IS OTHERWISE NEGATIVE.   10 point review of systems negative other than per HPI    Historical Information   Past Medical History:   Diagnosis Date   • Abnormal Pap smear of cervix    • Allergic    • Cluster headache    • Colon polyp    • Disease of thyroid gland    • GERD (gastroesophageal reflux disease)    • Headache(784.0)     Migraine   • Headache, tension-type    • HL (hearing loss)     Sudden hearing loss hearing mostly came back   • Hypothyroidism    • Migraine      Past Surgical History:   Procedure Laterality Date   • ANKLE SURGERY Left 03/2020   • ANTERIOR CRUCIATE LIGAMENT REPAIR Right 2007 • COLONOSCOPY     • FL INJECTION LEFT HIP (ARTHROGRAM)  08/23/2021   • HIP SURGERY Left 2014    nerve entrapment release   • TENDON REPAIR  04/06    ACL Repair   • WRIST SURGERY Right 03/2009    Dr Jitendra Luther   • WRIST SURGERY Right 02/07/2020     Social History   Social History     Substance and Sexual Activity   Alcohol Use Not Currently    Comment: Social      Social History     Substance and Sexual Activity   Drug Use Yes    Comment: CBD gummies     Social History     Tobacco Use   Smoking Status Never   • Passive exposure: Past   Smokeless Tobacco Never     Family History   Problem Relation Age of Onset   • Hypothyroidism Mother    • Migraines Mother    • Thyroid disease Mother    • Transient ischemic attack Mother    • Hyperlipidemia Father         Pure   • Stomach cancer Paternal Grandmother 80   • Cancer Paternal Grandmother         Pancreatic at 80   • Ovarian cancer Maternal Grandmother         Age 68   • Cancer Maternal Grandmother         Ovarian at 68   • Anxiety disorder Sister    • Lymphoma Maternal Grandfather 80   • No Known Problems Paternal Grandfather    • Prostate cancer Maternal Uncle    • Prostate cancer Paternal Uncle        Meds/Allergies       Current Outpatient Medications:   •  cephalexin (KEFLEX) 500 mg capsule  •  EPINEPHrine (EPIPEN) 0.3 mg/0.3 mL SOAJ  •  esomeprazole (NexIUM) 40 MG capsule  •  levothyroxine 75 mcg tablet  •  Lo Loestrin Fe 1 MG-10 MCG / 10 MCG TABS  •  multivitamin (THERAGRAN) TABS  •  Ubrogepant (UBRELVY) 100 MG tablet  •  citalopram (CeleXA) 10 mg tablet  •  pantoprazole (PROTONIX) 40 mg tablet    Allergies   Allergen Reactions   • Codeine      Other reaction(s): Palpitations  Reaction Date: 03Aug2011;    • Molds & Smuts    • Wound Dressing Adhesive Rash     Redness             Objective     Blood pressure 110/76, pulse 66, temperature 98.5 °F (36.9 °C), temperature source Tympanic, height 5' 1" (1.549 m), weight 46.6 kg (102 lb 12.8 oz), not currently breastfeeding. Body mass index is 19.42 kg/m². PHYSICAL EXAM:      General Appearance:   Alert, cooperative, no distress   HEENT:   Normocephalic, atraumatic, anicteric. Neck:  Supple, symmetrical, trachea midline   Lungs:   Clear to auscultation bilaterally; no rales, rhonchi or wheezing; respirations unlabored    Heart[de-identified]   Regular rate and rhythm; no murmur, rub, or gallop. Abdomen:   Soft, non-tender, non-distended; normal bowel sounds; no masses, no organomegaly    Genitalia:   Deferred    Rectal:   Deferred    Extremities:  No cyanosis, clubbing or edema    Pulses:  2+ and symmetric    Skin:  No jaundice, rashes, or lesions    Lymph nodes:  No palpable cervical lymphadenopathy        Lab Results:   No visits with results within 1 Day(s) from this visit. Latest known visit with results is:   Office Visit on 12/04/2023   Component Date Value   • LEUKOCYTE ESTERASE,UA 12/04/2023 small    • NITRITE,UA 12/04/2023 neg    • SL AMB POCT UROBILINOGEN 12/04/2023 0.2    • POCT URINE PROTEIN 12/04/2023 neg    •  PH,UA 12/04/2023 7.0    • BLOOD,UA 12/04/2023 neg    • SPECIFIC GRAVITY,UA 12/04/2023 1.005    • KETONES,UA 12/04/2023 neg    • BILIRUBIN,UA 12/04/2023 neg    • GLUCOSE, UA 12/04/2023 neg    •  COLOR,UA 12/04/2023 straw    • CLARITY,UA 12/04/2023 clear    • Urine Culture 12/04/2023 No Growth <1000 cfu/mL          Radiology Results:   No results found.

## 2023-12-26 ENCOUNTER — TELEPHONE (OUTPATIENT)
Dept: NEUROLOGY | Facility: CLINIC | Age: 48
End: 2023-12-26

## 2023-12-26 DIAGNOSIS — G43.009 MIGRAINE WITHOUT AURA AND WITHOUT STATUS MIGRAINOSUS, NOT INTRACTABLE: Primary | ICD-10-CM

## 2023-12-26 NOTE — TELEPHONE ENCOUNTER
Pt called in to make f/u with Dr. Orr she was due in Sept. 2023. F/u made for 5/9/24 @ 9:30 with Dr. Orr in [facility].    Pt would also like a nurse call to discuss other migraine medication options.   Thank you.

## 2023-12-27 ENCOUNTER — HOSPITAL ENCOUNTER (OUTPATIENT)
Dept: NUCLEAR MEDICINE | Facility: HOSPITAL | Age: 48
Discharge: HOME | End: 2023-12-27

## 2023-12-27 RX ORDER — KETOROLAC TROMETHAMINE 10 MG/1
10 TABLET, FILM COATED ORAL DAILY PRN
Qty: 10 TABLET | Refills: 0 | Status: SHIPPED | OUTPATIENT
Start: 2023-12-27

## 2023-12-27 NOTE — TELEPHONE ENCOUNTER
Called pt re: below. States that she tried sumatriptan and rizatriptan but she did not tolerate it. Tried Nurtec which works but makes her super tired. Tried Ubrelvy but made her nauseous. Pt is asking for alternatives.  Advised per office notes- future options: prochlorperazine, Toradol IM or p.o., could consider trial of 5 days of Depakote 500 mg nightly or dexamethasone 2 mg daily for prolonged migraine, reyvow.   Pt states that she is not interested trying preventative med as she only gets 2 migraines per month. She would like to try toradol PO. Requesting be sent to [facility] on file.

## 2023-12-28 ENCOUNTER — HOSPITAL ENCOUNTER (OUTPATIENT)
Dept: RADIOLOGY | Facility: HOSPITAL | Age: 48
Discharge: HOME | End: 2023-12-28
Payer: COMMERCIAL

## 2023-12-28 DIAGNOSIS — R10.13 EPIGASTRIC PRESSURE: ICD-10-CM

## 2023-12-28 DIAGNOSIS — R11.0 NAUSEA: ICD-10-CM

## 2023-12-28 PROCEDURE — A9537 TC99M MEBROFENIN: HCPCS

## 2023-12-28 PROCEDURE — 78227 HEPATOBIL SYST IMAGE W/DRUG: CPT

## 2023-12-28 PROCEDURE — G1004 CDSM NDSC: HCPCS

## 2023-12-28 RX ORDER — SINCALIDE 5 UG/5ML
0.02 INJECTION, POWDER, LYOPHILIZED, FOR SOLUTION INTRAVENOUS
Status: COMPLETED | OUTPATIENT
Start: 2023-12-28 | End: 2023-12-28

## 2023-12-28 RX ADMIN — SINCALIDE 0.9 MCG: 5 INJECTION, POWDER, LYOPHILIZED, FOR SOLUTION INTRAVENOUS at 12:24

## 2023-12-28 NOTE — TELEPHONE ENCOUNTER
Called and advised pt of the below. She verbalized understanding. Pt states that she just remembered that she is not able to tolerate NSAIDS and any anti inflammatory meds. Asking to d/c toradol.   States that she has allergy to adhesive.  Pt states that since Ubrelvy makes her nauseous, she would like to go back on Nurtec. She does not have any left. Requesting script be sent to [facility] pharmacy on file. Advised pt that there is active PA approval on file for Nurtec good through 4/21/24 but will have to check w/ pharmacy if they're able to get a paid claim for Nurtec. Pt states that as of Jan 1, 2024, she will have new ins. If new PA is required for Nurtec, then our office can wait to initiate it in Jan as soon as she get her new ins info.   Cb [phone] , ok to leave a detailed message     Rx entered. Pls review and sign off    thanks

## 2023-12-28 NOTE — TELEPHONE ENCOUNTER
Sterling Gutierrez MD         12/27/23  4:43 PM  Note  Addended by: STERLING GUTIERREZ on: 12/27/2023 04:43 PM       Modules accepted: Orders          Sterling Gutierrez MD   to [facility] Clinical Team 5       12/27/23  4:46 PM  Toradol sent, although there was an allergy that popped up to wound adhesive? Does she tolerate NSAIDs? Also in the interim could try 1/2 nurtec disolvable tab and see if that helps without making her tired potentially. thanks

## 2024-01-05 NOTE — TELEPHONE ENCOUNTER
VM at 10:59 am:    Hi this is Lilian Hernandez, birthday, [birth year]. I am calling about a prescription refill for Nurtec that was supposed to be called in last week, but my pharmacy hasn't gotten that yet. If you could give me a call back, that would be great. [phone]. Thanks  ___________________________________________________________________________    Script for Nurtec was sent to the pharmacy on 12/28/23. Routed to clinical team to follow up with pt's pharmacy.

## 2024-01-05 NOTE — TELEPHONE ENCOUNTER
When I last spoke to the pt on 23, she stated that as of 24, she will have new ins.    Called pt re: below. Advised that Nurtec script was sent to [facility] pharmacy w/ 11 refills on 23. This med will most likely req PA. Advised pt that I will initiate urgent PA today and turnaround time up to 72 hrs. She verbalized understanding.   Pts new ins is [employer]  Rxbin [id number]  n [id number]  Group [id number]  Id [id number]  [phone]   [phone], ok to leave a detailed message    Urgent PA initiated on CMM. Key: [id number]  CaseId:[id number];Status:Approved;Review Type:Prior Auth;Coverage Start Date:2024;Coverage End Date:2025;     Called [facility] pharmacy, spoke to Flex. I provided pt's new ins info and made aware of the approval. States that he attempted to process the claim but it kicked right back saying that their pharmacy is not contracted w/ the plan.     Called PA dept at [phone], spoke to Lilibeth and advised of the above. States pt's plan is contracted w/ [employer]. Can send script to [facility] pharmacy.  Pt made aware and verbalized understanding    Rx entered. Pls review and sign off

## 2024-01-31 ENCOUNTER — ANESTHESIA (OUTPATIENT)
Dept: ANESTHESIOLOGY | Facility: HOSPITAL | Age: 49
End: 2024-01-31

## 2024-01-31 ENCOUNTER — ANESTHESIA EVENT (OUTPATIENT)
Dept: ANESTHESIOLOGY | Facility: HOSPITAL | Age: 49
End: 2024-01-31

## 2024-02-01 ENCOUNTER — TELEPHONE (OUTPATIENT)
Dept: GASTROENTEROLOGY | Facility: MEDICAL CENTER | Age: 49
End: 2024-02-01

## 2024-02-01 NOTE — TELEPHONE ENCOUNTER
Did you remind:    You will receive a call a day prior to let you know when to arrive.  Yes    Do you have a copy of your instructions? Yes    Did you remind them of special diets if applicable? NA    Did you remind patient to start clear liquid diet if applicable? Yes    Did you remind patient to hold:  NA      Do you have any other questions or concerns? Yes, describe: Migranes if she doesn't eat wants to be done early .  Explained it would be noted not guaranteed.  Can she take thyroid meds. Stated yes up to 2 hrs. Prior with small sips of water.

## 2024-02-13 RX ORDER — SODIUM CHLORIDE 9 MG/ML
125 INJECTION, SOLUTION INTRAVENOUS CONTINUOUS
Status: CANCELLED | OUTPATIENT
Start: 2024-02-13

## 2024-02-15 ENCOUNTER — TELEPHONE (OUTPATIENT)
Age: 49
End: 2024-02-15

## 2024-02-15 ENCOUNTER — HOSPITAL ENCOUNTER (OUTPATIENT)
Dept: GASTROENTEROLOGY | Facility: MEDICAL CENTER | Age: 49
Setting detail: OUTPATIENT SURGERY
End: 2024-02-15
Payer: COMMERCIAL

## 2024-02-15 ENCOUNTER — ANESTHESIA EVENT (OUTPATIENT)
Dept: GASTROENTEROLOGY | Facility: MEDICAL CENTER | Age: 49
End: 2024-02-15

## 2024-02-15 ENCOUNTER — ANESTHESIA (OUTPATIENT)
Dept: GASTROENTEROLOGY | Facility: MEDICAL CENTER | Age: 49
End: 2024-02-15

## 2024-02-15 VITALS
DIASTOLIC BLOOD PRESSURE: 52 MMHG | SYSTOLIC BLOOD PRESSURE: 90 MMHG | HEIGHT: 61 IN | BODY MASS INDEX: 19.26 KG/M2 | OXYGEN SATURATION: 100 % | WEIGHT: 102 LBS | RESPIRATION RATE: 18 BRPM | TEMPERATURE: 97.6 F | HEART RATE: 68 BPM

## 2024-02-15 DIAGNOSIS — R11.0 NAUSEA: ICD-10-CM

## 2024-02-15 DIAGNOSIS — K21.9 GASTROESOPHAGEAL REFLUX DISEASE WITHOUT ESOPHAGITIS: Chronic | ICD-10-CM

## 2024-02-15 DIAGNOSIS — R10.13 EPIGASTRIC PRESSURE: ICD-10-CM

## 2024-02-15 LAB
EXT PREGNANCY TEST URINE: NEGATIVE
EXT. CONTROL: NORMAL

## 2024-02-15 PROCEDURE — 43239 EGD BIOPSY SINGLE/MULTIPLE: CPT | Performed by: INTERNAL MEDICINE

## 2024-02-15 PROCEDURE — 88305 TISSUE EXAM BY PATHOLOGIST: CPT | Performed by: PATHOLOGY

## 2024-02-15 PROCEDURE — 81025 URINE PREGNANCY TEST: CPT | Performed by: ANESTHESIOLOGY

## 2024-02-15 RX ORDER — PANTOPRAZOLE SODIUM 40 MG/1
40 TABLET, DELAYED RELEASE ORAL
Qty: 60 TABLET | Refills: 3 | Status: SHIPPED | OUTPATIENT
Start: 2024-02-15

## 2024-02-15 RX ORDER — FAMOTIDINE 40 MG/1
40 TABLET, FILM COATED ORAL DAILY
COMMUNITY

## 2024-02-15 RX ORDER — PROPOFOL 10 MG/ML
INJECTION, EMULSION INTRAVENOUS AS NEEDED
Status: DISCONTINUED | OUTPATIENT
Start: 2024-02-15 | End: 2024-02-15

## 2024-02-15 RX ORDER — SODIUM CHLORIDE 9 MG/ML
125 INJECTION, SOLUTION INTRAVENOUS CONTINUOUS
Status: DISCONTINUED | OUTPATIENT
Start: 2024-02-15 | End: 2024-02-19 | Stop reason: HOSPADM

## 2024-02-15 RX ORDER — LIDOCAINE HCL/PF 100 MG/5ML
SYRINGE (ML) INJECTION AS NEEDED
Status: DISCONTINUED | OUTPATIENT
Start: 2024-02-15 | End: 2024-02-15

## 2024-02-15 RX ADMIN — LIDOCAINE HYDROCHLORIDE 100 MG: 20 INJECTION INTRAVENOUS at 09:10

## 2024-02-15 RX ADMIN — SODIUM CHLORIDE 125 ML/HR: 0.9 INJECTION, SOLUTION INTRAVENOUS at 09:05

## 2024-02-15 RX ADMIN — PROPOFOL 30 MG: 10 INJECTION, EMULSION INTRAVENOUS at 09:13

## 2024-02-15 RX ADMIN — PROPOFOL 100 MG: 10 INJECTION, EMULSION INTRAVENOUS at 09:10

## 2024-02-15 NOTE — ANESTHESIA PREPROCEDURE EVALUATION
Procedure:  EGD    Relevant Problems   ANESTHESIA (within normal limits)      CARDIO   (+) Headache, variant migraine      ENDO   (+) Hypothyroidism      GI/HEPATIC   (+) Gastroesophageal reflux disease      NEURO/PSYCH   (+) Headache, variant migraine      Nervous and Auditory   (+) Hearing loss      Other   (+) Adjustment reaction with anxiety and depression        Physical Exam    Airway    Mallampati score: II  TM Distance: >3 FB  Neck ROM: full     Dental   No notable dental hx     Cardiovascular  Rhythm: regular, Rate: normal, Cardiovascular exam normal    Pulmonary  Pulmonary exam normal Breath sounds clear to auscultation    Other Findings  post-pubertal.      Anesthesia Plan  ASA Score- 2     Anesthesia Type- IV sedation with anesthesia with ASA Monitors.         Additional Monitors:     Airway Plan:            Plan Factors-Exercise tolerance (METS): >4 METS.    Chart reviewed.   Existing labs reviewed. Patient summary reviewed.    Patient is not a current smoker.              Induction- intravenous.    Postoperative Plan-     Informed Consent- Anesthetic plan and risks discussed with patient.

## 2024-02-15 NOTE — TELEPHONE ENCOUNTER
PA for pantoprazole BID     Submitted via  [x]CMM-KEY  [id number]   []Surescripts-Case ID #   []Faxed to plan   []Other website   []Phone call Case ID #     Office notes sent, clinical questions answered. Awaiting determination

## 2024-02-15 NOTE — ANESTHESIA POSTPROCEDURE EVALUATION
"Post-Op Assessment Note    CV Status:  Stable    Pain management: adequate       Mental Status:  Alert and awake   Hydration Status:  Euvolemic   PONV Controlled:  Controlled   Airway Patency:  Patent  Two or more mitigation strategies used for obstructive sleep apnea   Post Op Vitals Reviewed: Yes    No anethesia notable event occurred.    Staff: Anesthesiologist               BP      Temp      Pulse     Resp      SpO2      BP 90/52   Pulse 68   Temp 97.6 °F (36.4 °C) (Temporal)   Resp 18   Ht 5' 0.75\" (1.543 m)   Wt 46.3 kg (102 lb)   SpO2 100%   BMI 19.43 kg/m²     "

## 2024-02-15 NOTE — H&P
H&P EXAM - Outpatient Endoscopy   Lilian Hernandez 48 y.o. female MRN: [id number]    [facility] ENDOSCOPY   Encounter: [id number]        History and Physical - [facility]  Lilian Hernandez 48 y.o. female MRN: [id number]                  HPI: Lilian Hernandez is a 48 y.o. year old female who presents for abdominal pain, GERD      REVIEW OF SYSTEMS: Per the HPI, and otherwise unremarkable.    Historical Information   Past Medical History:   Diagnosis Date    Abnormal Pap smear of cervix     Allergic     Cluster headache     Colon polyp     Disease of thyroid gland     GERD (gastroesophageal reflux disease)     Headache(784.0)     Migraine    Headache, tension-type     HL (hearing loss)     Sudden hearing loss hearing mostly came back    Hypothyroidism     Migraine      Past Surgical History:   Procedure Laterality Date    ANKLE SURGERY Left 03/2020    ANTERIOR CRUCIATE LIGAMENT REPAIR Right 2007    COLONOSCOPY      FL INJECTION LEFT HIP (ARTHROGRAM)  08/23/2021    HIP SURGERY Left 2014    nerve entrapment release    TENDON REPAIR  04/06    ACL Repair    WRIST SURGERY Right 03/2009    Dr Cifuentes    WRIST SURGERY Right 02/07/2020     Social History   Social History     Substance and Sexual Activity   Alcohol Use Not Currently    Comment: Social      Social History     Substance and Sexual Activity   Drug Use Yes    Comment: CBD gummies     Social History     Tobacco Use   Smoking Status Never    Passive exposure: Past   Smokeless Tobacco Never     Family History   Problem Relation Age of Onset    Hypothyroidism Mother     Migraines Mother     Thyroid disease Mother     Transient ischemic attack Mother     Hyperlipidemia Father         Pure    Stomach cancer Paternal Grandmother 90    Cancer Paternal Grandmother         Pancreatic at 92    Ovarian cancer Maternal Grandmother 50        Age 76    Cancer Maternal Grandmother         Ovarian at 76    Anxiety disorder Sister     Lymphoma  Maternal Grandfather 82    No Known Problems Paternal Grandfather     Prostate cancer Maternal Uncle     Prostate cancer Paternal Uncle        Meds/Allergies     (Not in a hospital admission)      Allergies   Allergen Reactions    Codeine      Other reaction(s): Palpitations  Reaction Date: 03Aug2011;     Molds & Smuts     Wound Dressing Adhesive Rash     Redness         Objective     There were no vitals taken for this visit.      PHYSICAL EXAM    Gen: NAD  CV: RRR  CHEST: Clear  ABD: soft, NT/ND  EXT: no edema      ASSESSMENT/PLAN:  This is a 48 y.o. year old female here for EGD, and she is stable and optimized for her procedure.

## 2024-02-16 ENCOUNTER — NURSE TRIAGE (OUTPATIENT)
Age: 49
End: 2024-02-16

## 2024-02-16 NOTE — TELEPHONE ENCOUNTER
"Patient states that she has taken it in the past and it gave her dry mouth and some nausea. States that she read up on it and see's that this can be a temporary side effect.  States that she will take them if PA is approved and  see how it is going when she sees you in the office in about 2 weeks.     Reason for Disposition   Caller has medicine question only, adult not sick, and triager answers question    Answer Assessment - Initial Assessment Questions  1. NAME of MEDICATION: \"What medicine are you calling about?\"      Pantoprazole  2. QUESTION: \"What is your question?\" (e.g., medication refill, side effect)      Patient states that she has taken it in the past and it gave her dry mouth and some nausea. States that she read up on it and see's that this can be a temporary side effect.  States that she will take them if PA is approved and  see how it is going when she sees you in the office.    Protocols used: Medication Question Call-ADULT-[place]    "

## 2024-02-19 PROCEDURE — 88305 TISSUE EXAM BY PATHOLOGIST: CPT | Performed by: PATHOLOGY

## 2024-02-19 NOTE — TELEPHONE ENCOUNTER
PA for pantoprazole Approved   Date(s) approved until 2/14/2025  Case # [id number]     Patient advised by [x] [employer] Message                      [x] Phone call patient happy      Pharmacy advised by [x]Fax                                     []Phone call    Approval letter scanned into Media No ---no aproval letter at this time

## 2024-03-06 ENCOUNTER — NURSE TRIAGE (OUTPATIENT)
Age: 49
End: 2024-03-06

## 2024-03-06 DIAGNOSIS — K21.9 GASTROESOPHAGEAL REFLUX DISEASE WITHOUT ESOPHAGITIS: Primary | ICD-10-CM

## 2024-03-06 RX ORDER — OMEPRAZOLE 40 MG/1
40 CAPSULE, DELAYED RELEASE ORAL
Qty: 30 CAPSULE | Refills: 3 | Status: SHIPPED | OUTPATIENT
Start: 2024-03-06

## 2024-03-06 NOTE — TELEPHONE ENCOUNTER
"  Pt calling in, reports she has been on pantoprazole 40 mg BID since 2/19 and she is still experiencing pressure in epigastric region, nausea, headache and belching. She is not able to sleep much at night due to the discomfort.     Pt would like to know if she should switch to omeprazole instead     Reason for Disposition   Caller has NON-URGENT medicine question about med that PCP or specialist prescribed and triager unable to answer question    Answer Assessment - Initial Assessment Questions  1. NAME of MEDICATION: \"What medicine are you calling about?\"      Pantoprazole 40 mg   2. QUESTION: \"What is your question?\" (e.g., medication refill, side effect)      Causeing symptoms   3. PRESCRIBING HCP: \"Who prescribed it?\" Reason: if prescribed by specialist, call should be referred to that group.      GI   4. SYMPTOMS: \"Do you have any symptoms?\"    Protocols used: Medication Question Call-ADULT-[place]    "

## 2024-03-29 DIAGNOSIS — K21.9 GASTROESOPHAGEAL REFLUX DISEASE WITHOUT ESOPHAGITIS: ICD-10-CM

## 2024-03-29 RX ORDER — OMEPRAZOLE 40 MG/1
40 CAPSULE, DELAYED RELEASE ORAL
Qty: 90 CAPSULE | Refills: 1 | Status: SHIPPED | OUTPATIENT
Start: 2024-03-29

## 2024-04-04 ENCOUNTER — OFFICE VISIT (OUTPATIENT)
Dept: GASTROENTEROLOGY | Facility: MEDICAL CENTER | Age: 49
End: 2024-04-04
Payer: COMMERCIAL

## 2024-04-04 VITALS
OXYGEN SATURATION: 98 % | SYSTOLIC BLOOD PRESSURE: 116 MMHG | DIASTOLIC BLOOD PRESSURE: 64 MMHG | WEIGHT: 103.4 LBS | HEART RATE: 71 BPM | HEIGHT: 61 IN | TEMPERATURE: 97.6 F | BODY MASS INDEX: 19.52 KG/M2

## 2024-04-04 DIAGNOSIS — K25.9 MULTIPLE GASTRIC ULCERS: ICD-10-CM

## 2024-04-04 DIAGNOSIS — Z86.010 HISTORY OF COLON POLYPS: Primary | ICD-10-CM

## 2024-04-04 PROCEDURE — 99214 OFFICE O/P EST MOD 30 MIN: CPT | Performed by: NURSE PRACTITIONER

## 2024-04-04 RX ORDER — POLYETHYLENE GLYCOL 3350 17 G/17G
238 POWDER, FOR SOLUTION ORAL ONCE
Qty: 238 G | Refills: 0 | Status: SHIPPED | OUTPATIENT
Start: 2024-04-04 | End: 2024-04-04

## 2024-04-04 NOTE — PROGRESS NOTES
[facility] - Outpatient Follow-up Note  Lilian Hernandez 48 y.o. female MRN: [id number]  Encounter: [id number]          ASSESSMENT AND PLAN:      1.  Gastric ulcer  2.  Hiatal hernia    Recent EGD noted 2 small ulcers in the antrum and hiatal hernia.  Biopsies were negative for neoplasm and H. pylori.  No NSAID use.  Rare caffeine use.  Rare alcohol use.  She is on omeprazole 40 mg daily for the past month and feeling better overall.  She is to take PPI daily for 2 months and then reduce back to her Pepcid 40 mg daily.  I reviewed her procedure results in detail.    -Complete 8 weeks of omeprazole 40 mg then start Pepcid 40 mg daily  -Antireflux diet  -EGD to assess healing.  Patient would like to do colonoscopy at the same time.  -Follow-up in office after testing        3.  Personal history of colon polyp    BMs are brown and formed daily.  Denies any melena or hematochezia.  Last colonoscopy was 2021 noted scarring at the polypectomy site in the cecum.  Pathology was normal.  Recommended repeat colonoscopy in 3 years.  Denies any family history of any colon cancers or polyps.    -Colonoscopy with MiraLAX/Dulcolax prep    I obtained informed consent from the patient. The risks/benefits/alternatives of the procedure were discussed with the patient. Risks included, but not limited to, infection, bleeding, perforation, injury to organs in the abdomen, missed lesion and incomplete procedure were discussed. Patient was agreeable and electronic signature was obtained.   ______________________________________________________________________    SUBJECTIVE: 48-year-old female here for follow-up.  She was last seen by myself 12/7/2023 for epigastric pain and GERD.    History of epigastric discomfort and GERD.  EGD in 2020 showed small hiatal hernia.  She is Candida esophagitis that was treated with a course of fluconazole.  Biopsies were negative for celiac and H. pylori.  The right upper quadrant  was normal.  She does get epigastric burning/pressure that can radiate to her right chest and right upper back.  Can be worse after eating.  She has had 2 episodes that approximately started 3 hours after meal.  Not pinpoint specific food triggers.  Rarely drinks caffeine.  No NSAID use.  Rare alcohol use.  Pain started approximately 1 year ago. She had a HIDA scan with [facility] which was normal.    Feeling better overall with omeprazole 40 mg daily.  Recent EGD noted a small hiatal hernia and 2 small ulcers in the antrum.  Biopsies negative for H. pylori and neoplasm.  She is to take omeprazole for 8 weeks and then reduce back to her famotidine milligrams daily.  Denies any nausea, vomiting or dysphagia.  No abdominal pain.    BMs are brown and formed daily.  Denies any melena or hematochezia.  Last colonoscopy was 2021 noted scarring at the polypectomy site in the cecum.  Pathology was normal.  Recommended repeat colonoscopy in 3 years.  Denies any family history of any colon cancers or polyps.      Prior EGD/colonoscopy      EGD 2/24-Small hiatal hernia, 2 small, ulcers in the antrum.  Biopsies negative for H. pylori.    EGD 2020-mild hiatal hernia and Candida esophagitis which was treated with fluconazole.     Colonoscopy 2020-20 mm flat cecal polyp just behind the IC valve.  It was removed piecemeal and showed sessile serrated adenoma.  Repeat colonoscopy was performed 10/21 showing scarring at the polypectomy site and pathology was normal.  Was recommended to repeat colonoscopy in 3 years.    REVIEW OF SYSTEMS IS OTHERWISE NEGATIVE.  10 point review of systems negative other than per HPI      Historical Information   Past Medical History:   Diagnosis Date   • Abnormal Pap smear of cervix    • Allergic    • Cluster headache    • Colon polyp    • Disease of thyroid gland    • GERD (gastroesophageal reflux disease)    • Headache(784.0)     Migraine   • Headache, tension-type    • HL (hearing loss)     Sudden hearing  loss hearing mostly came back   • Hypothyroidism    • Migraine      Past Surgical History:   Procedure Laterality Date   • ANKLE SURGERY Left 03/2020   • ANTERIOR CRUCIATE LIGAMENT REPAIR Right 2007   • COLONOSCOPY     • FL INJECTION LEFT HIP (ARTHROGRAM)  08/23/2021   • HIP SURGERY Left 2014    nerve entrapment release   • TENDON REPAIR  04/06    ACL Repair   • WRIST SURGERY Right 03/2009    Dr Cifuentes   • WRIST SURGERY Right 02/07/2020     Social History   Social History     Substance and Sexual Activity   Alcohol Use Not Currently    Comment: Social      Social History     Substance and Sexual Activity   Drug Use Yes    Comment: CBD gummies     Social History     Tobacco Use   Smoking Status Never   • Passive exposure: Past   Smokeless Tobacco Never     Family History   Problem Relation Age of Onset   • Hypothyroidism Mother    • Migraines Mother    • Thyroid disease Mother    • Transient ischemic attack Mother    • Hyperlipidemia Father         Pure   • Stomach cancer Paternal Grandmother 90   • Cancer Paternal Grandmother         Pancreatic at 92   • Ovarian cancer Maternal Grandmother 50        Age 76   • Cancer Maternal Grandmother         Ovarian at 76   • Anxiety disorder Sister    • Lymphoma Maternal Grandfather 82   • No Known Problems Paternal Grandfather    • Prostate cancer Maternal Uncle    • Prostate cancer Paternal Uncle        Meds/Allergies       Current Outpatient Medications:   •  EPINEPHrine (EPIPEN) 0.3 mg/0.3 mL SOAJ  •  famotidine (Pepcid) 40 MG tablet  •  levothyroxine 75 mcg tablet  •  Lo Loestrin Fe 1 MG-10 MCG / 10 MCG TABS  •  multivitamin (THERAGRAN) TABS  •  omeprazole (PriLOSEC) 40 MG capsule  •  polyethylene glycol (MiraLax) 17 GM/SCOOP powder  •  rimegepant sulfate (NURTEC) 75 mg TBDP  •  citalopram (CeleXA) 10 mg tablet  •  ketorolac (TORADOL) 10 mg tablet    Allergies   Allergen Reactions   • Codeine      Other reaction(s): Palpitations  Reaction Date: 03Aug2011;    • Molds & Smuts  "   • Wound Dressing Adhesive Rash     Redness             Objective     Blood pressure 116/64, pulse 71, temperature 97.6 °F (36.4 °C), height 5' 1\" (1.549 m), weight 46.9 kg (103 lb 6.4 oz), SpO2 98%, not currently breastfeeding. Body mass index is 19.54 kg/m².      PHYSICAL EXAM:      General Appearance:   Alert, cooperative, no distress   HEENT:   Normocephalic, atraumatic, anicteric.     Neck:  Supple, symmetrical, trachea midline   Lungs:   Clear to auscultation bilaterally; no rales, rhonchi or wheezing; respirations unlabored    Heart::   Regular rate and rhythm; no murmur, rub, or gallop.   Abdomen:   Soft, non-tender, non-distended; normal bowel sounds; no masses, no organomegaly    Genitalia:   Deferred    Rectal:   Deferred    Extremities:  No cyanosis, clubbing or edema    Pulses:  2+ and symmetric    Skin:  No jaundice, rashes, or lesions    Lymph nodes:  No palpable cervical lymphadenopathy        Lab Results:   No visits with results within 1 Day(s) from this visit.   Latest known visit with results is:   Hospital Outpatient Visit on 02/15/2024   Component Date Value   • EXT Preg Test, Ur 02/15/2024 Negative    • Control 02/15/2024 Valid    • Case Report 02/15/2024                      Value:Surgical Pathology Report                         Case: [id number]                                  Authorizing Provider:  Diana M Jaiyeola, MD       Collected:           02/15/2024 0911              Ordering Location:     [facility]        Received:            02/15/2024 [address] [place] [address] Endoscopy                                                     Pathologist:           Davide Dee MD                                                                 Specimen:    Stomach, gastric bx r/o h pylori                                                          • Final Diagnosis " 02/15/2024                      Value:This result contains rich text formatting which cannot be displayed here.   • Additional Information 02/15/2024                      Value:This result contains rich text formatting which cannot be displayed here.   • Gross Description 02/15/2024                      Value:This result contains rich text formatting which cannot be displayed here.         Radiology Results:   No results found.

## 2024-05-28 ENCOUNTER — NURSE TRIAGE (OUTPATIENT)
Age: 49
End: 2024-05-28

## 2024-05-28 NOTE — TELEPHONE ENCOUNTER
"  Returned call to patient. It was a dog tick. She would like to know if anything needs to be done. Please advise.         Reason for Disposition   Tick bite with no complications    Answer Assessment - Initial Assessment Questions  1. TYPE of TICK: \"Is it a wood tick or a deer tick?\" If unsure, ask: \"What size was the tick?\" \"Did it look more like a watermelon seed or a poppy seed?\"       DOG tick   2. LOCATION: \"Where is the tick bite located?\"       head  3. ONSET: \"How long do you think the tick was attached before you removed it?\" (Hours or days)       Maybe a day or two  4. TETANUS: \"When was the last tetanus booster?\"       unsure  5. PREGNANCY: \"Is there any chance you are pregnant?\" \"When was your last menstrual period?\"      N.a    Protocols used: Tick Bite-ADULT-[place]    "

## 2024-05-28 NOTE — TELEPHONE ENCOUNTER
Regarding: Tick  ----- Message from Amy HAMMER sent at 5/28/2024 11:09 AM EDT -----  Patient calling was outdoors this weekend. Found a tick in hair this morning. Please advise.

## 2024-06-26 DIAGNOSIS — Z30.41 ENCOUNTER FOR SURVEILLANCE OF CONTRACEPTIVE PILLS: ICD-10-CM

## 2024-06-26 NOTE — TELEPHONE ENCOUNTER
Reason for call:   [x] Refill   [] Prior Auth  [] Other:     Office:   [x] PCP/Provider - Corinne Russo MD   [] Specialty/Provider -     Medication:       Does the patient have enough for 3 days?   [] Yes   [x] No - Send as HP to POD

## 2024-06-27 RX ORDER — NORETHINDRONE ACETATE AND ETHINYL ESTRADIOL, ETHINYL ESTRADIOL AND FERROUS FUMARATE 1MG-10(24)
1 KIT ORAL DAILY
Qty: 84 TABLET | Refills: 0 | Status: SHIPPED | OUTPATIENT
Start: 2024-06-27

## 2024-07-19 ENCOUNTER — TELEPHONE (OUTPATIENT)
Dept: GASTROENTEROLOGY | Facility: MEDICAL CENTER | Age: 49
End: 2024-07-19

## 2024-08-02 ENCOUNTER — ANESTHESIA EVENT (OUTPATIENT)
Dept: ANESTHESIOLOGY | Facility: HOSPITAL | Age: 49
End: 2024-08-02

## 2024-08-02 ENCOUNTER — ANESTHESIA (OUTPATIENT)
Dept: ANESTHESIOLOGY | Facility: HOSPITAL | Age: 49
End: 2024-08-02

## 2024-08-08 DIAGNOSIS — E03.9 HYPOTHYROIDISM, UNSPECIFIED TYPE: ICD-10-CM

## 2024-08-08 RX ORDER — LEVOTHYROXINE SODIUM 0.07 MG/1
75 TABLET ORAL DAILY
Qty: 90 TABLET | Refills: 0 | Status: SHIPPED | OUTPATIENT
Start: 2024-08-08

## 2024-08-12 ENCOUNTER — RA CDI HCC (OUTPATIENT)
Dept: OTHER | Facility: HOSPITAL | Age: 49
End: 2024-08-12

## 2024-08-12 NOTE — PROGRESS NOTES
HCC coding opportunities       Chart reviewed, no opportunity found: CHART REVIEWED, NO OPPORTUNITY FOUND        Patients Insurance        Commercial Insurance: [email or web address]

## 2024-08-15 ENCOUNTER — TELEPHONE (OUTPATIENT)
Dept: GASTROENTEROLOGY | Facility: CLINIC | Age: 49
End: 2024-08-15

## 2024-08-18 ENCOUNTER — TELEPHONE (OUTPATIENT)
Dept: FAMILY MEDICINE CLINIC | Facility: CLINIC | Age: 49
End: 2024-08-18

## 2024-08-22 ENCOUNTER — ANNUAL EXAM (OUTPATIENT)
Dept: OBGYN CLINIC | Facility: CLINIC | Age: 49
End: 2024-08-22
Payer: COMMERCIAL

## 2024-08-22 VITALS
BODY MASS INDEX: 18.88 KG/M2 | DIASTOLIC BLOOD PRESSURE: 60 MMHG | SYSTOLIC BLOOD PRESSURE: 104 MMHG | WEIGHT: 100 LBS | HEIGHT: 61 IN

## 2024-08-22 DIAGNOSIS — Z30.41 ENCOUNTER FOR SURVEILLANCE OF CONTRACEPTIVE PILLS: ICD-10-CM

## 2024-08-22 DIAGNOSIS — Z12.31 ENCOUNTER FOR SCREENING MAMMOGRAM FOR MALIGNANT NEOPLASM OF BREAST: ICD-10-CM

## 2024-08-22 DIAGNOSIS — Z01.419 WELL WOMAN EXAM WITH ROUTINE GYNECOLOGICAL EXAM: Primary | ICD-10-CM

## 2024-08-22 PROCEDURE — S0612 ANNUAL GYNECOLOGICAL EXAMINA: HCPCS | Performed by: OBSTETRICS & GYNECOLOGY

## 2024-08-22 RX ORDER — NORETHINDRONE ACETATE AND ETHINYL ESTRADIOL, ETHINYL ESTRADIOL AND FERROUS FUMARATE 1MG-10(24)
1 KIT ORAL DAILY
Qty: 84 TABLET | Refills: 4 | Status: SHIPPED | OUTPATIENT
Start: 2024-08-22

## 2024-08-22 NOTE — PROGRESS NOTES
"ASSESSMENT & PLAN:   Diagnoses and all orders for this visit:    Well woman exam with routine gynecological exam    Encounter for screening mammogram for malignant neoplasm of breast  -     Mammo screening bilateral w 3d & cad; Future    Encounter for surveillance of contraceptive pills  -     Lo Loestrin Fe 1 MG-10 MCG / 10 MCG TABS; Take 1 tablet by mouth daily      Discussed sleep hygiene techniques to help with sleep disruption and helping to fall back asleep.     The following were reviewed in today's visit: ASCCP guidelines, Gardisil vaccination, STD testing breast self exam, mammography screening ordered, menopause, exercise, healthy diet, and colonoscopy reviewed.    Patient to return to office in yearly for annual exam.     All questions have been answered to her satisfaction.        CC:  Annual Gynecologic Examination  Chief Complaint   Patient presents with    Gynecologic Exam     Annual exam, pap not indicated. Pt would like to discuss if she should stop taking ocp and perimenopause. Pt c/o \"brain fog and fatigue.\"   Lo loestrin Fe   Pap 22 wnl, hpv-  hx of LEEP - q3 year paps if normal until   Mammo 10/26/2023 Negative   Colonoscopy 10/04/2021-Repeat 3 yrs, Next one scheduled 10/31/2024          HPI: Lilian Hernandez is a 48 y.o.  who presents for annual gynecologic examination.  She has the following concerns:  reports poor sleeping pattern. Will wake up in the middle of the night (between 2 - 4AM) that are occurring frequently, about 4 out of 7 nights.       Health Maintenance:    Exercise: frequently  Breast exams/breast awareness: yes  Last mammogram:  - BIRADS 1  Colorectal cancer screenin - repeat recommended in 3 years, due . Scheduled for 2024.     Past Medical History:   Diagnosis Date    Abnormal Pap smear of cervix     Allergic     Cluster headache     Colon polyp     Disease of thyroid gland     GERD (gastroesophageal reflux disease)     Headache(784.0)  "    Migraine    Headache, tension-type     HL (hearing loss)     Sudden hearing loss hearing mostly came back    Hypothyroidism     Migraine        Past Surgical History:   Procedure Laterality Date    ANKLE SURGERY Left 03/2020    ANTERIOR CRUCIATE LIGAMENT REPAIR Right 2007    COLONOSCOPY      FL INJECTION LEFT HIP (ARTHROGRAM)  08/23/2021    HIP SURGERY Left 2014    nerve entrapment release    TENDON REPAIR  04/06    ACL Repair    WRIST SURGERY Right 03/2009    Dr Cifuentes    WRIST SURGERY Right 02/07/2020       Past OB/Gyn History:   No LMP recorded (lmp unknown). (Menstrual status: Birth Control).    Menopausal status: perimenopausal  Menopausal symptoms: None    Last Pap: 2022 : no abnormalities  History of abnormal Pap smear: yes - LEEP about 17 years ago. Normal paps since    Patient is not currently sexually active.   STD testing: no  Current contraception: OCP (estrogen/progesterone)      Family History  Family History   Problem Relation Age of Onset    Hypothyroidism Mother     Migraines Mother     Thyroid disease Mother     Transient ischemic attack Mother     Hyperlipidemia Father         Pure    Anxiety disorder Sister     Hyperlipidemia Sister     Ovarian cancer Maternal Grandmother 50        Age 76    Cancer Maternal Grandmother         Ovarian at 76    Lymphoma Maternal Grandfather 82    Stomach cancer Paternal Grandmother 90    Cancer Paternal Grandmother         Pancreatic at 92    No Known Problems Paternal Grandfather     Prostate cancer Maternal Uncle     Prostate cancer Paternal Uncle     Breast cancer Other        Family history of uterine or ovarian cancer: yes - maternal grandmother, ovarian cancer (age 76)  Family history of breast cancer: no  Family history of colon cancer: no    Social History:  Social History     Socioeconomic History    Marital status:      Spouse name: Not on file    Number of children: Not on file    Years of education: Not on file    Highest education level: Not  on file   Occupational History    Not on file   Tobacco Use    Smoking status: Never     Passive exposure: Past    Smokeless tobacco: Never   Vaping Use    Vaping status: Never Used   Substance and Sexual Activity    Alcohol use: Not Currently     Comment: Social     Drug use: Yes     Comment: CBD gummies    Sexual activity: Not Currently     Partners: Male     Birth control/protection: Other, OCP     Comment: Birth Control loloestrin   Other Topics Concern    Not on file   Social History Narrative    Caffeine use     Social Determinants of Health     Financial Resource Strain: Not on file   Food Insecurity: Not on file   Transportation Needs: Not on file   Physical Activity: Not on file   Stress: Not on file   Social Connections: Not on file   Intimate Partner Violence: Not on file   Housing Stability: Not on file     Domestic violence screen: negative    Allergies:  Allergies   Allergen Reactions    Bee Venom Anaphylaxis    Codeine      Other reaction(s): Palpitations  Reaction Date: 03Aug2011;     Medical Tape Dermatitis    Molds & Smuts     Wound Dressing Adhesive Rash     Redness         Medications:    Current Outpatient Medications:     EPINEPHrine (EPIPEN) 0.3 mg/0.3 mL SOAJ, INJECT 0.3 ML (0.3 MG TOTAL) INTO A MUSCLE AS NEEDED FOR ANAPHYLAXIS, Disp: 2 each, Rfl: 1    levothyroxine 75 mcg tablet, TAKE 1 TABLET BY MOUTH EVERY DAY, Disp: 90 tablet, Rfl: 0    Lo Loestrin Fe 1 MG-10 MCG / 10 MCG TABS, Take 1 tablet by mouth daily, Disp: 84 tablet, Rfl: 4    multivitamin (THERAGRAN) TABS, Take 1 tablet by mouth daily, Disp: , Rfl:     omeprazole (PriLOSEC) 40 MG capsule, TAKE 1 CAPSULE BY MOUTH EVERY DAY BEFORE BREAKFAST, Disp: 90 capsule, Rfl: 1    rimegepant sulfate (NURTEC) 75 mg TBDP, Take one NURTEC 75 mg at onset under tongue. Limit 1 in 24 hours, Disp: 8 tablet, Rfl: 11    famotidine (Pepcid) 40 MG tablet, Take 40 mg by mouth daily (Patient not taking: Reported on 8/22/2024), Disp: , Rfl:     polyethylene  "glycol (MiraLax) 17 GM/SCOOP powder, Take 238 g by mouth once for 1 dose Take 238 g my mouth. Use as directed (Patient not taking: Reported on 8/22/2024), Disp: 238 g, Rfl: 0    Review of Systems:  Review of Systems   Constitutional:  Negative for activity change, appetite change and unexpected weight change.   Respiratory:  Negative for cough and shortness of breath.    Cardiovascular:  Negative for chest pain.   Gastrointestinal:  Negative for abdominal pain, constipation, diarrhea, nausea and vomiting.   Genitourinary:  Negative for difficulty urinating, dyspareunia, frequency, menstrual problem, pelvic pain, urgency, vaginal bleeding, vaginal discharge and vaginal pain.   Musculoskeletal:  Negative for back pain.   Skin: Negative.    Neurological:  Negative for dizziness, weakness, light-headedness and headaches.   Psychiatric/Behavioral: Negative.           Physical Exam:  /60 (BP Location: Left arm, Patient Position: Sitting, Cuff Size: Standard)   Ht 5' 1\" (1.549 m)   Wt 45.4 kg (100 lb)   LMP  (LMP Unknown) Comment: On lo loestrin  BMI 18.89 kg/m²    Physical Exam  Constitutional:       General: She is not in acute distress.     Appearance: Normal appearance. She is well-developed and normal weight. She is not diaphoretic.   Genitourinary:      Vulva and bladder normal.      No lesions in the vagina.      Genitourinary Comments: Perineum normal in appearance, no lacerations, no ulcerations, no lesions visualized.      Right Labia: No rash, tenderness or lesions.     Left Labia: No tenderness, lesions or rash.     No inguinal adenopathy present in the right or left side.     No vaginal discharge, erythema, tenderness or bleeding.      No vaginal prolapse present.     No vaginal atrophy present.       Right Adnexa: not tender, not full and no mass present.     Left Adnexa: not tender, not full and no mass present.     Cervix is nulliparous.      No cervical motion tenderness, discharge, friability, " lesion or polyp.      No parametrium nodularity or thickening present.     Uterus is not enlarged, tender or prolapsed.      No uterine mass detected.     Uterus is anteverted.      No urethral prolapse or mass present.      Bladder is not tender.       Pelvic exam was performed with patient in the lithotomy position.   Rectum:      No tenderness or external hemorrhoid.   Breasts:     Breasts are symmetrical.      Right: No swelling, bleeding, mass, skin change or tenderness.      Left: No swelling, bleeding, mass, skin change or tenderness.   HENT:      Head: Normocephalic and atraumatic.   Neck:      Thyroid: No thyromegaly or thyroid tenderness.   Cardiovascular:      Rate and Rhythm: Normal rate and regular rhythm.      Heart sounds: Normal heart sounds. No murmur heard.     No friction rub.   Pulmonary:      Effort: Pulmonary effort is normal. No respiratory distress.      Breath sounds: Normal breath sounds. No wheezing or rales.   Abdominal:      Palpations: Abdomen is soft. There is no mass.      Tenderness: There is no abdominal tenderness. There is no guarding.   Musculoskeletal:         General: No tenderness. Normal range of motion.      Right lower leg: No edema.      Left lower leg: No edema.   Lymphadenopathy:      Lower Body: No right inguinal adenopathy. No left inguinal adenopathy.   Neurological:      Mental Status: She is alert and oriented to person, place, and time.   Skin:     General: Skin is warm and dry.      Coloration: Skin is not pale.      Findings: No erythema.   Psychiatric:         Mood and Affect: Mood normal.         Behavior: Behavior normal.         Thought Content: Thought content normal.         Judgment: Judgment normal.   Vitals and nursing note reviewed.

## 2024-09-12 ENCOUNTER — OFFICE VISIT (OUTPATIENT)
Dept: URGENT CARE | Facility: CLINIC | Age: 49
End: 2024-09-12
Payer: COMMERCIAL

## 2024-09-12 VITALS
HEART RATE: 72 BPM | HEIGHT: 61 IN | OXYGEN SATURATION: 100 % | TEMPERATURE: 98.1 F | WEIGHT: 101.4 LBS | BODY MASS INDEX: 19.14 KG/M2 | RESPIRATION RATE: 18 BRPM

## 2024-09-12 DIAGNOSIS — R10.84 GENERALIZED ABDOMINAL PAIN: Primary | ICD-10-CM

## 2024-09-12 LAB
SL AMB  POCT GLUCOSE, UA: NORMAL
SL AMB LEUKOCYTE ESTERASE,UA: NORMAL
SL AMB POCT BILIRUBIN,UA: NORMAL
SL AMB POCT BLOOD,UA: NORMAL
SL AMB POCT CLARITY,UA: CLEAR
SL AMB POCT COLOR,UA: NORMAL
SL AMB POCT KETONES,UA: NORMAL
SL AMB POCT NITRITE,UA: NORMAL
SL AMB POCT PH,UA: 7
SL AMB POCT SPECIFIC GRAVITY,UA: 1010
SL AMB POCT URINE PROTEIN: NORMAL
SL AMB POCT UROBILINOGEN: NORMAL

## 2024-09-12 PROCEDURE — 99203 OFFICE O/P NEW LOW 30 MIN: CPT | Performed by: PHYSICIAN ASSISTANT

## 2024-09-12 PROCEDURE — 81002 URINALYSIS NONAUTO W/O SCOPE: CPT | Performed by: PHYSICIAN ASSISTANT

## 2024-09-12 NOTE — PROGRESS NOTES
Assessment/Plan    Generalized abdominal pain [R10.84]  1. Generalized abdominal pain  POCT urine dip        Unclear etiology of symptoms but does not appear to be a UTI. Encouraged limiting water before bed and making symptom diary to be brought to OBGYN. Discussed strict return to care precautions as well as red flag symptoms which should prompt immediate ED referral. Pt verbalized understanding and is in agreement with plan.  Please follow up with your primary care provider within the next week. Please remember that your visit today was with an urgent care provider and should not replace follow up with your primary care provider for chronic medical issues or annual physicals.       Subjective:     Patient ID: Lilian Hernandez is a 48 y.o. female.      Reason For Visit / Chief Complaint  Chief Complaint   Patient presents with    Abdominal Pain     May have UTI pressure and pain in lower abdomen when voiding and after voiding         The patient is a 48-year-old female with a PMHx of GERD and headaches presenting to the urgent care clinic today with lower abdominal cramping and pressure x1wk. States that the cramping has become more consistent over the course of the week. Reports sensation of incomplete emptying. Reports that her symptoms are present mainly in the morning when she wakes up. Drinks 6-8oz. of water before bed. Denies fevers or chills, dysuria, flank pain, frequency, urgency, hesitancy, vaginal discharge/pruritus/bleeding/foul odor. Headaches unchanged from baseline. Also reports insomnia and brain fog that she believes is unrelated and has been present in the recent months. Hasn't had a period in 7 years due to her Lo Loestrin. Hasn't had sexual intercourse in 1 year.     UTD on all vaccinations. No tobacco or illicit drug use. Reports that she was laid off from her job as a  about 5 months ago, so has been experiencing some stress from working free [employer]. Denies financial stress.            Past Medical History:   Diagnosis Date    Abnormal Pap smear of cervix     Allergic     Cluster headache     Colon polyp     Disease of thyroid gland     GERD (gastroesophageal reflux disease)     Headache(784.0)     Migraine    Headache, tension-type     HL (hearing loss)     Sudden hearing loss hearing mostly came back    Hypothyroidism     Migraine        Past Surgical History:   Procedure Laterality Date    ANKLE SURGERY Left 03/2020    ANTERIOR CRUCIATE LIGAMENT REPAIR Right 2007    COLONOSCOPY      FL INJECTION LEFT HIP (ARTHROGRAM)  08/23/2021    HIP SURGERY Left 2014    nerve entrapment release    TENDON REPAIR  04/06    ACL Repair    WRIST SURGERY Right 03/2009    Dr Cifuentes    WRIST SURGERY Right 02/07/2020       Family History   Problem Relation Age of Onset    Hypothyroidism Mother     Migraines Mother     Thyroid disease Mother     Transient ischemic attack Mother     Hyperlipidemia Father         Pure    Anxiety disorder Sister     Hyperlipidemia Sister     Ovarian cancer Maternal Grandmother 50        Age 76    Cancer Maternal Grandmother         Ovarian at 76    Lymphoma Maternal Grandfather 82    Stomach cancer Paternal Grandmother 90    Cancer Paternal Grandmother         Pancreatic at 92    No Known Problems Paternal Grandfather     Prostate cancer Maternal Uncle     Prostate cancer Paternal Uncle     Breast cancer Other        Review of Systems   Constitutional:  Negative for chills and fever.   Cardiovascular:  Negative for chest pain.   Gastrointestinal:  Positive for abdominal distention. Negative for abdominal pain, anal bleeding, constipation, diarrhea and vomiting.   Genitourinary:  Negative for decreased urine volume, difficulty urinating, dysuria, flank pain, frequency, hematuria, menstrual problem, pelvic pain, urgency, vaginal bleeding, vaginal discharge and vaginal pain.   Neurological:  Positive for headaches.   Psychiatric/Behavioral:  Positive for sleep disturbance.    All  "other systems reviewed and are negative.      Objective:    Pulse 72   Temp 98.1 °F (36.7 °C)   Resp 18   Ht 5' 1\" (1.549 m)   Wt 46 kg (101 lb 6.4 oz)   LMP  (LMP Unknown) Comment: On lo loestrin  SpO2 100%   BMI 19.16 kg/m²     Physical Exam  Vitals and nursing note reviewed.   Constitutional:       General: She is not in acute distress.     Appearance: Normal appearance. She is well-developed and normal weight. She is not ill-appearing.   HENT:      Head: Normocephalic and atraumatic.      Right Ear: Tympanic membrane, ear canal and external ear normal.      Left Ear: Tympanic membrane, ear canal and external ear normal.      Nose: Nose normal. No rhinorrhea.      Mouth/Throat:      Pharynx: No posterior oropharyngeal erythema.   Cardiovascular:      Rate and Rhythm: Normal rate and regular rhythm.      Pulses: Normal pulses.      Heart sounds: Normal heart sounds. No murmur heard.     No friction rub. No gallop.   Pulmonary:      Effort: Pulmonary effort is normal. No respiratory distress.      Breath sounds: Normal breath sounds. No wheezing, rhonchi or rales.   Abdominal:      General: Abdomen is flat. Bowel sounds are normal. There is no distension.      Palpations: Abdomen is soft.      Tenderness: There is no abdominal tenderness. There is no right CVA tenderness, left CVA tenderness or guarding.   Skin:     General: Skin is warm and dry.      Capillary Refill: Capillary refill takes less than 2 seconds.      Findings: No erythema, lesion or rash.   Neurological:      Mental Status: She is alert and oriented to person, place, and time.   Psychiatric:         Behavior: Behavior normal.             "

## 2024-09-17 ENCOUNTER — OFFICE VISIT (OUTPATIENT)
Dept: GASTROENTEROLOGY | Facility: MEDICAL CENTER | Age: 49
End: 2024-09-17
Payer: COMMERCIAL

## 2024-09-17 VITALS
HEART RATE: 84 BPM | SYSTOLIC BLOOD PRESSURE: 122 MMHG | TEMPERATURE: 97.9 F | BODY MASS INDEX: 19.07 KG/M2 | OXYGEN SATURATION: 98 % | DIASTOLIC BLOOD PRESSURE: 76 MMHG | HEIGHT: 61 IN | WEIGHT: 101 LBS

## 2024-09-17 DIAGNOSIS — R10.30 LOWER ABDOMINAL PAIN: Primary | ICD-10-CM

## 2024-09-17 DIAGNOSIS — Z86.010 PERSONAL HISTORY OF COLONIC POLYPS: ICD-10-CM

## 2024-09-17 DIAGNOSIS — K25.9 GASTRIC ULCER WITHOUT HEMORRHAGE OR PERFORATION, UNSPECIFIED CHRONICITY: ICD-10-CM

## 2024-09-17 DIAGNOSIS — K21.9 GASTROESOPHAGEAL REFLUX DISEASE WITHOUT ESOPHAGITIS: ICD-10-CM

## 2024-09-17 PROCEDURE — 99214 OFFICE O/P EST MOD 30 MIN: CPT | Performed by: NURSE PRACTITIONER

## 2024-09-17 RX ORDER — OMEPRAZOLE 40 MG/1
40 CAPSULE, DELAYED RELEASE ORAL
Qty: 90 CAPSULE | Refills: 3 | Status: SHIPPED | OUTPATIENT
Start: 2024-09-17

## 2024-09-17 NOTE — PROGRESS NOTES
"[facility] - Outpatient Follow-up Note  Lilian Hernandez 48 y.o. female MRN: [id number]  Encounter: [id number]          ASSESSMENT AND PLAN:      1.  Gastric ulcer  2.  Hiatal hernia    Recent EGD noted 2 small ulcers in the antrum and hiatal hernia. Biopsies were negative for neoplasm and H. pylori. No NSAID use. Rare caffeine use. Rare alcohol use. She is on omeprazole 40 mg daily for the past month and feeling better overall. She is to take PPI daily for 2 months and then reduce back to her Pepcid 40 mg daily.     -Continue omeprazole 40 mg daily  -EGD as scheduled 10/31/2024  -Follow-up in office after testing      3.  Personal history of colon polyps  4.  Lower abdominal pain    Last colonoscopy was 2020 which noted a 20 mm flat cecal polyp just behind the IC valve.  Was removed piecemeal and showed sessile serrated adenoma.  Repeat colonoscopy 10/21 showed scarring at the polypectomy site and pathology was normal.  Recommended 3-year repeat colonoscopy.  She is scheduled for this 10/31/2024.  Reports BMs are brown and formed daily.  Denies any melena or medic easier.  She did start approximately 10 days ago with lower abdominal cramping that feels like \"menstrual cramps\" with some urgency urinating.  She did have a urinalysis which was normal.  Continues with this lower abdominal cramping.  Can awaken her at night.  No fever or chills.    -CT abdomen pelvis to rule out any lower abdominal pathology such as diverticulitis  -Colonoscopy is scheduled 10/31/2024  -Will contact patient with results of CT  -CBC and CMP  ______________________________________________________________________    SUBJECTIVE: 48-year-old female here for follow-up.  She was last seen by myself/4/24 for history of gastric ulcer, hiatal hernia and personal history of colon polyps.    Recent EGD noted 2 small ulcers in the antrum and hiatal hernia. Biopsies were negative for neoplasm and H. pylori. No NSAID use. " "Rare caffeine use. Rare alcohol use. She is on omeprazole 40 mg daily for the past month and feeling better overall. She is to take PPI daily for 2 months and then reduce back to her Pepcid 40 mg daily.     BMs are brown and formed daily. Denies any melena or hematochezia. Last colonoscopy was 2021 noted scarring at the polypectomy site in the cecum. Pathology was normal. Recommended repeat colonoscopy in 3 years. Denies any family history of any colon cancers or polyps.     Interval history: Last EGD 2/24 noted small hiatal hernia, 2 small ulcers in the antrum.  Biopsies negative for H. pylori.  She has a repeat EGD scheduled for 10/31/2024.  Currently taking omeprazole 40 mg daily.  Reports it is controlling her reflux.  Denies any nausea, vomiting or dysphagia.  Breastbone pain has now resolved.    Last colonoscopy was 2020 which noted a 20 mm flat cecal polyp just behind the IC valve.  Was removed piecemeal and showed sessile serrated adenoma.  Repeat colonoscopy 10/21 showed scarring at the polypectomy site and pathology was normal.  Recommended 3-year repeat colonoscopy.  She is scheduled for this 10/31/2024.  Reports BMs are brown and formed daily.  Denies any melena or medic easier.  She did start approximately 10 days ago with lower abdominal cramping that feels like \"menstrual cramps\" with some urgency urinating.  She did have a urinalysis which was normal.  Continues with this lower abdominal cramping.  Can awaken her at night.  No fever or chills.    Prior EGD/colonoscopy     EGD 2/24-Small hiatal hernia, 2 small, ulcers in the antrum.  Biopsies negative for H. pylori.     EGD 2020-mild hiatal hernia and Candida esophagitis which was treated with fluconazole.     Colonoscopy 2020-20 mm flat cecal polyp just behind the IC valve.  It was removed piecemeal and showed sessile serrated adenoma.  Repeat colonoscopy was performed 10/21 showing scarring at the polypectomy site and pathology was normal.  Was " recommended to repeat colonoscopy in 3 years.    REVIEW OF SYSTEMS IS OTHERWISE NEGATIVE.  10 point review of systems negative other than per HPI      Historical Information   Past Medical History:   Diagnosis Date    Abnormal Pap smear of cervix     Allergic     Cluster headache     Colon polyp     Disease of thyroid gland     GERD (gastroesophageal reflux disease)     Headache(784.0)     Migraine    Headache, tension-type     HL (hearing loss)     Sudden hearing loss hearing mostly came back    Hypothyroidism     Migraine      Past Surgical History:   Procedure Laterality Date    ANKLE SURGERY Left 03/2020    ANTERIOR CRUCIATE LIGAMENT REPAIR Right 2007    COLONOSCOPY      FL INJECTION LEFT HIP (ARTHROGRAM)  08/23/2021    HIP SURGERY Left 2014    nerve entrapment release    TENDON REPAIR  04/06    ACL Repair    WRIST SURGERY Right 03/2009    Dr Cifuentes    WRIST SURGERY Right 02/07/2020     Social History   Social History     Substance and Sexual Activity   Alcohol Use Not Currently    Comment: Social      Social History     Substance and Sexual Activity   Drug Use Yes    Comment: CBD gummies     Social History     Tobacco Use   Smoking Status Never    Passive exposure: Past   Smokeless Tobacco Never     Family History   Problem Relation Age of Onset    Hypothyroidism Mother     Migraines Mother     Thyroid disease Mother     Transient ischemic attack Mother     Hyperlipidemia Father         Pure    Anxiety disorder Sister     Hyperlipidemia Sister     Ovarian cancer Maternal Grandmother 50        Age 76    Cancer Maternal Grandmother         Ovarian at 76    Lymphoma Maternal Grandfather 82    Stomach cancer Paternal Grandmother 90    Cancer Paternal Grandmother         Pancreatic at 92    No Known Problems Paternal Grandfather     Prostate cancer Maternal Uncle     Prostate cancer Paternal Uncle     Breast cancer Other        Meds/Allergies       Current Outpatient Medications:     EPINEPHrine (EPIPEN) 0.3 mg/0.3  "mL SOAJ    levothyroxine 75 mcg tablet    Lo Loestrin Fe 1 MG-10 MCG / 10 MCG TABS    multivitamin (THERAGRAN) TABS    omeprazole (PriLOSEC) 40 MG capsule    rimegepant sulfate (NURTEC) 75 mg TBDP    famotidine (Pepcid) 40 MG tablet    polyethylene glycol (MiraLax) 17 GM/SCOOP powder    Allergies   Allergen Reactions    Bee Venom Anaphylaxis    Codeine      Other reaction(s): Palpitations  Reaction Date: 03Aug2011;     Medical Tape Dermatitis    Molds & Smuts     Wound Dressing Adhesive Rash     Redness             Objective     Blood pressure 122/76, pulse 84, temperature 97.9 °F (36.6 °C), height 5' 1\" (1.549 m), weight 45.8 kg (101 lb), SpO2 98%, not currently breastfeeding. Body mass index is 19.08 kg/m².      PHYSICAL EXAM:      General Appearance:   Alert, cooperative, no distress   HEENT:   Normocephalic, atraumatic, anicteric.     Neck:  Supple, symmetrical, trachea midline   Lungs:   Clear to auscultation bilaterally; no rales, rhonchi or wheezing; respirations unlabored    Heart::   Regular rate and rhythm; no murmur, rub, or gallop.   Abdomen:   Soft, non-tender, non-distended; normal bowel sounds; no masses, no organomegaly    Genitalia:   Deferred    Rectal:   Deferred    Extremities:  No cyanosis, clubbing or edema    Pulses:  2+ and symmetric    Skin:  No jaundice, rashes, or lesions    Lymph nodes:  No palpable cervical lymphadenopathy        Lab Results:   No visits with results within 1 Day(s) from this visit.   Latest known visit with results is:   Office Visit on 09/12/2024   Component Date Value    LEUKOCYTE ESTERASE,UA 09/12/2024 trace     NITRITE,UA 09/12/2024 nrg     SL AMB POCT UROBILINOGEN 09/12/2024 neg     POCT URINE PROTEIN 09/12/2024 neg      PH,UA 09/12/2024 7     BLOOD,UA 09/12/2024 neg     SPECIFIC GRAVITY,UA 09/12/2024 1,010     KETONES,UA 09/12/2024 neg     BILIRUBIN,UA 09/12/2024 neg     GLUCOSE, UA 09/12/2024 neg      COLOR,UA 09/12/2024 straw     CLARITY,UA 09/12/2024 clear  "         Radiology Results:   No results found.

## 2024-09-18 ENCOUNTER — TELEPHONE (OUTPATIENT)
Age: 49
End: 2024-09-18

## 2024-09-18 NOTE — TELEPHONE ENCOUNTER
Pt called refill line stating that pharmacy told her that insurance was not covering Omeprazole but while on the phone she received a message stating Rx was ready for . She will call them to confirm whether or not PA is needed.    In the meantime, sending message to PA for review in case it is needed.

## 2024-09-19 ENCOUNTER — APPOINTMENT (OUTPATIENT)
Dept: LAB | Facility: CLINIC | Age: 49
End: 2024-09-19
Payer: COMMERCIAL

## 2024-09-19 ENCOUNTER — TRANSCRIBE ORDERS (OUTPATIENT)
Dept: LAB | Facility: CLINIC | Age: 49
End: 2024-09-19

## 2024-09-19 DIAGNOSIS — E55.9 VITAMIN D DEFICIENCY: ICD-10-CM

## 2024-09-19 DIAGNOSIS — E06.3 HYPOTHYROIDISM DUE TO HASHIMOTO'S THYROIDITIS: ICD-10-CM

## 2024-09-19 DIAGNOSIS — R10.30 LOWER ABDOMINAL PAIN: ICD-10-CM

## 2024-09-19 LAB
ALBUMIN SERPL BCG-MCNC: 4 G/DL (ref 3.5–5)
ALP SERPL-CCNC: 45 U/L (ref 34–104)
ALT SERPL W P-5'-P-CCNC: 17 U/L (ref 7–52)
ANION GAP SERPL CALCULATED.3IONS-SCNC: 6 MMOL/L (ref 4–13)
AST SERPL W P-5'-P-CCNC: 19 U/L (ref 13–39)
BASOPHILS # BLD AUTO: 0.06 THOUSANDS/ΜL (ref 0–0.1)
BASOPHILS NFR BLD AUTO: 1 % (ref 0–1)
BILIRUB SERPL-MCNC: 0.59 MG/DL (ref 0.2–1)
BUN SERPL-MCNC: 8 MG/DL (ref 5–25)
CALCIUM SERPL-MCNC: 9 MG/DL (ref 8.4–10.2)
CHLORIDE SERPL-SCNC: 103 MMOL/L (ref 96–108)
CO2 SERPL-SCNC: 27 MMOL/L (ref 21–32)
CREAT SERPL-MCNC: 0.77 MG/DL (ref 0.6–1.3)
EOSINOPHIL # BLD AUTO: 0.05 THOUSAND/ΜL (ref 0–0.61)
EOSINOPHIL NFR BLD AUTO: 1 % (ref 0–6)
ERYTHROCYTE [DISTWIDTH] IN BLOOD BY AUTOMATED COUNT: 12.7 % (ref 11.6–15.1)
GFR SERPL CREATININE-BSD FRML MDRD: 91 ML/MIN/1.73SQ M
GLUCOSE P FAST SERPL-MCNC: 86 MG/DL (ref 65–99)
HCT VFR BLD AUTO: 40.1 % (ref 34.8–46.1)
HGB BLD-MCNC: 12.7 G/DL (ref 11.5–15.4)
IMM GRANULOCYTES # BLD AUTO: 0.01 THOUSAND/UL (ref 0–0.2)
IMM GRANULOCYTES NFR BLD AUTO: 0 % (ref 0–2)
LYMPHOCYTES # BLD AUTO: 1.95 THOUSANDS/ΜL (ref 0.6–4.47)
LYMPHOCYTES NFR BLD AUTO: 37 % (ref 14–44)
MCH RBC QN AUTO: 31.2 PG (ref 26.8–34.3)
MCHC RBC AUTO-ENTMCNC: 31.7 G/DL (ref 31.4–37.4)
MCV RBC AUTO: 99 FL (ref 82–98)
MONOCYTES # BLD AUTO: 0.45 THOUSAND/ΜL (ref 0.17–1.22)
MONOCYTES NFR BLD AUTO: 9 % (ref 4–12)
NEUTROPHILS # BLD AUTO: 2.78 THOUSANDS/ΜL (ref 1.85–7.62)
NEUTS SEG NFR BLD AUTO: 52 % (ref 43–75)
NRBC BLD AUTO-RTO: 0 /100 WBCS
PLATELET # BLD AUTO: 217 THOUSANDS/UL (ref 149–390)
PMV BLD AUTO: 13.9 FL (ref 8.9–12.7)
POTASSIUM SERPL-SCNC: 4.3 MMOL/L (ref 3.5–5.3)
PROT SERPL-MCNC: 6.5 G/DL (ref 6.4–8.4)
RBC # BLD AUTO: 4.07 MILLION/UL (ref 3.81–5.12)
SODIUM SERPL-SCNC: 136 MMOL/L (ref 135–147)
WBC # BLD AUTO: 5.3 THOUSAND/UL (ref 4.31–10.16)

## 2024-09-19 PROCEDURE — 85025 COMPLETE CBC W/AUTO DIFF WBC: CPT

## 2024-09-19 PROCEDURE — 80053 COMPREHEN METABOLIC PANEL: CPT

## 2024-09-19 PROCEDURE — 36415 COLL VENOUS BLD VENIPUNCTURE: CPT

## 2024-09-23 ENCOUNTER — OFFICE VISIT (OUTPATIENT)
Dept: FAMILY MEDICINE CLINIC | Facility: CLINIC | Age: 49
End: 2024-09-23
Payer: COMMERCIAL

## 2024-09-23 VITALS
OXYGEN SATURATION: 100 % | HEART RATE: 67 BPM | RESPIRATION RATE: 16 BRPM | HEIGHT: 61 IN | BODY MASS INDEX: 18.77 KG/M2 | SYSTOLIC BLOOD PRESSURE: 120 MMHG | TEMPERATURE: 98 F | DIASTOLIC BLOOD PRESSURE: 74 MMHG | WEIGHT: 99.4 LBS

## 2024-09-23 DIAGNOSIS — E55.9 VITAMIN D DEFICIENCY: ICD-10-CM

## 2024-09-23 DIAGNOSIS — G43.809 HEADACHE, VARIANT MIGRAINE: ICD-10-CM

## 2024-09-23 DIAGNOSIS — G47.09 OTHER INSOMNIA: ICD-10-CM

## 2024-09-23 DIAGNOSIS — R10.30 LOWER ABDOMINAL PAIN: ICD-10-CM

## 2024-09-23 DIAGNOSIS — N95.1 PERIMENOPAUSE: ICD-10-CM

## 2024-09-23 DIAGNOSIS — E06.3 HYPOTHYROIDISM DUE TO HASHIMOTO'S THYROIDITIS: ICD-10-CM

## 2024-09-23 DIAGNOSIS — E03.8 HYPOTHYROIDISM DUE TO HASHIMOTO'S THYROIDITIS: ICD-10-CM

## 2024-09-23 DIAGNOSIS — Z00.00 ENCOUNTER FOR WELLNESS EXAMINATION IN ADULT: Primary | ICD-10-CM

## 2024-09-23 PROCEDURE — 99396 PREV VISIT EST AGE 40-64: CPT | Performed by: FAMILY MEDICINE

## 2024-09-23 RX ORDER — TRAZODONE HYDROCHLORIDE 50 MG/1
50 TABLET, FILM COATED ORAL
Qty: 30 TABLET | Refills: 2 | Status: SHIPPED | OUTPATIENT
Start: 2024-09-23

## 2024-09-23 NOTE — PATIENT INSTRUCTIONS
Please proceed with CAT scan as scheduled, please request copy to primary care physician  If CAT scan is unremarkable and if pelvic for persist-I will be in favor of checking pelvic ultrasound  We may consider discussing changing dose of your birth control pills as long as no objection for neurology and GYN

## 2024-09-23 NOTE — ASSESSMENT & PLAN NOTE
Levothyroxine 75 mcg daily  Orders:    Lipid Panel with Direct LDL reflex; Future    TSH, 3rd generation; Future    Lipid Panel with Direct LDL reflex    TSH, 3rd generation

## 2024-09-23 NOTE — PROGRESS NOTES
"Ambulatory Visit  Name: Lilian Hernandez      : 1975      MRN: [id number]  Encounter Provider: Jessica Son MD  Encounter Date: 2024   Encounter department: [facility]    Assessment & Plan  Encounter for wellness examination in adult  Pending colonoscopy, EGD and mammogram.  Patient follows with [facility] for routine care       Hypothyroidism due to Hashimoto's thyroiditis  Levothyroxine 75 mcg daily  Orders:    Lipid Panel with Direct LDL reflex; Future    TSH, 3rd generation; Future    Lipid Panel with Direct LDL reflex    TSH, 3rd generation    Vitamin D deficiency    Orders:    Vitamin D 25 hydroxy; Future    Vitamin D 25 hydroxy    Other insomnia  Orders:    traZODone (DESYREL) 50 mg tablet; Take 1 tablet (50 mg total) by mouth daily at bedtime as needed for sleep    Lower abdominal pain  Intermittent symptoms of lower abdominal pain/suprapubic pressure.  Urinalysis is negative.  No symptoms of dysuria.  Patient was recently evaluated by GI and is scheduled for CT abdomen and pelvis and colonoscopy.  If no pertinent findings on CT-consider follow-up with GYN         Headache, variant migraine  Chronic migraines.  Patient follows with neurology in [place]  No daily preventative therapy  She uses Nurtec with great results, usually once a week         Perimenopause  Chronic fatigue, insomnia, \"brain fog\"  Patient remains on low Loestrin 1-10 mcg, takes Rx continuously  Patient is under care of [facility], Dr. Russo  Mammogram is scheduled  Continue healthy diet regular physical activity   Trial of trazodone nightly as needed            Patient Instructions   Please proceed with CAT scan as scheduled, please request copy to primary care physician  If CAT scan is unremarkable and if pelvic for persist-I will be in favor of checking pelvic ultrasound  We may consider discussing changing dose of your birth control pills as long as no objection for neurology and " GYN    History of Present Illness     The patient presents for annual well exam.   Symptoms of perimenopause, complains of fatigue, brain fog and insomnia.   She has been experience intermittent lower abdominal pressure, cramping and bloating, some suprapubic pressure.  Urine test was negative.  Patient denies symptoms of dysuria, frequency or urgency.  She had evaluation by GI and will be proceeding with CT abdomen and pelvis.  Patient is up-to-date with routine GYN exam, usually follows with Dr. Russo.  She is on Loestrin BCP, takes Rx back-to-back so she does not have any menses.     Chronic headaches, migraines.  She follows with a neurologist in [place].  No preventative Rx.  Takes Nurtec as needed with great results, usually once a week.    Scheduled for mammogram, colonoscopy and EGD within next few months.    Review of Systems  Past Medical History:   Diagnosis Date    Abnormal Pap smear of cervix     Allergic     Cluster headache     Colon polyp     Disease of thyroid gland     GERD (gastroesophageal reflux disease)     Headache(784.0)     Migraine    Headache, tension-type     HL (hearing loss)     Sudden hearing loss hearing mostly came back    Hypothyroidism     Migraine      Past Surgical History:   Procedure Laterality Date    ANKLE SURGERY Left 03/2020    ANTERIOR CRUCIATE LIGAMENT REPAIR Right 2007    COLONOSCOPY      FL INJECTION LEFT HIP (ARTHROGRAM)  08/23/2021    HIP SURGERY Left 2014    nerve entrapment release    TENDON REPAIR  04/06    ACL Repair    WRIST SURGERY Right 03/2009    Dr Cifuentes    WRIST SURGERY Right 02/07/2020     Family History   Problem Relation Age of Onset    Hypothyroidism Mother     Migraines Mother     Thyroid disease Mother     Transient ischemic attack Mother     Hyperlipidemia Father         Pure    Anxiety disorder Sister     Hyperlipidemia Sister     Ovarian cancer Maternal Grandmother 50        Age 76    Cancer Maternal Grandmother         Ovarian at 76    Lymphoma  Maternal Grandfather 82    Stomach cancer Paternal Grandmother 90    Cancer Paternal Grandmother         Pancreatic at 92    No Known Problems Paternal Grandfather     Prostate cancer Maternal Uncle     Prostate cancer Paternal Uncle     Breast cancer Other      Social History     Tobacco Use    Smoking status: Never     Passive exposure: Past    Smokeless tobacco: Never   Vaping Use    Vaping status: Never Used   Substance and Sexual Activity    Alcohol use: Not Currently     Comment: Social     Drug use: Yes     Comment: CBD gummies    Sexual activity: Not Currently     Partners: Male     Birth control/protection: Other     Comment: Birth Control loloestrin     Current Outpatient Medications on File Prior to Visit   Medication Sig    EPINEPHrine (EPIPEN) 0.3 mg/0.3 mL SOAJ INJECT 0.3 ML (0.3 MG TOTAL) INTO A MUSCLE AS NEEDED FOR ANAPHYLAXIS    famotidine (Pepcid) 40 MG tablet Take 40 mg by mouth daily    levothyroxine 75 mcg tablet TAKE 1 TABLET BY MOUTH EVERY DAY    Lo Loestrin Fe 1 MG-10 MCG / 10 MCG TABS Take 1 tablet by mouth daily    multivitamin (THERAGRAN) TABS Take 1 tablet by mouth daily    omeprazole (PriLOSEC) 40 MG capsule Take 1 capsule (40 mg total) by mouth daily before breakfast    polyethylene glycol (MiraLax) 17 GM/SCOOP powder Take 238 g by mouth once for 1 dose Take 238 g my mouth. Use as directed    rimegepant sulfate (NURTEC) 75 mg TBDP Take one NURTEC 75 mg at onset under tongue. Limit 1 in 24 hours     Allergies   Allergen Reactions    Bee Venom Anaphylaxis    Codeine      Other reaction(s): Palpitations  Reaction Date: 03Aug2011;     Medical Tape Dermatitis    Molds & Smuts     Wound Dressing Adhesive Rash     Redness       Immunization History   Administered Date(s) Administered    COVID-19 PFIZER VACCINE 0.3 ML IM 03/29/2021, 04/19/2021, 11/19/2021    COVID-19 Pfizer Vac BIVALENT Ray-sucrose 12 Yr+ IM 09/11/2022    COVID-19 Pfizer mRNA vacc PF ray-sucrose 12 yr and older (Comirnaty)  "09/27/2023    INFLUENZA 10/12/2021, 09/11/2022, 10/12/2022    Influenza, injectable, quadrivalent, preservative free 0.5 mL 11/16/2020    Tdap 09/18/2013, 12/15/2015     Objective     /74 (BP Location: Left arm, Patient Position: Sitting, Cuff Size: Standard)   Pulse 67   Temp 98 °F (36.7 °C) (Temporal)   Resp 16   Ht 5' 1\" (1.549 m)   Wt 45.1 kg (99 lb 6.4 oz)   SpO2 100%   BMI 18.78 kg/m²     Physical Exam  Vitals and nursing note reviewed.   Constitutional:       General: She is not in acute distress.     Appearance: Normal appearance. She is well-developed. She is not ill-appearing.   HENT:      Head: Normocephalic and atraumatic.   Eyes:      General: No scleral icterus.     Conjunctiva/sclera: Conjunctivae normal.   Neck:      Thyroid: No thyromegaly.      Vascular: No carotid bruit.   Cardiovascular:      Rate and Rhythm: Normal rate and regular rhythm.      Heart sounds: Normal heart sounds. No murmur heard.  Pulmonary:      Effort: Pulmonary effort is normal. No respiratory distress.      Breath sounds: Normal breath sounds. No wheezing.   Abdominal:      General: Bowel sounds are normal. There is no distension or abdominal bruit.      Palpations: Abdomen is soft.      Tenderness: There is abdominal tenderness in the suprapubic area. There is no right CVA tenderness, left CVA tenderness, guarding or rebound.      Hernia: No hernia is present.   Musculoskeletal:         General: Normal range of motion.      Cervical back: Neck supple.      Right lower leg: No edema.      Left lower leg: No edema.   Skin:     General: Skin is warm.   Neurological:      General: No focal deficit present.      Mental Status: She is alert and oriented to person, place, and time.      Cranial Nerves: No cranial nerve deficit.      Coordination: Coordination normal.   Psychiatric:         Mood and Affect: Mood normal.         Behavior: Behavior normal.         Thought Content: Thought content normal.     "

## 2024-09-25 ENCOUNTER — HOSPITAL ENCOUNTER (OUTPATIENT)
Dept: RADIOLOGY | Facility: HOSPITAL | Age: 49
Discharge: HOME | End: 2024-09-25
Payer: COMMERCIAL

## 2024-09-25 DIAGNOSIS — R10.30 LOWER ABDOMINAL PAIN: ICD-10-CM

## 2024-09-25 PROBLEM — N95.1 PERIMENOPAUSE: Status: ACTIVE | Noted: 2022-06-17

## 2024-09-25 PROCEDURE — 74177 CT ABD & PELVIS W/CONTRAST: CPT

## 2024-09-25 RX ADMIN — IOHEXOL 100 ML: 350 INJECTION, SOLUTION INTRAVENOUS at 14:14

## 2024-09-26 NOTE — ASSESSMENT & PLAN NOTE
"Chronic fatigue, insomnia, \"brain fog\"  Patient remains on low Loestrin 1-10 mcg, takes Rx continuously  Patient is under care of [facility], Dr. Russo  Mammogram is scheduled  Continue healthy diet regular physical activity   Trial of trazodone nightly as needed         "

## 2024-09-26 NOTE — ASSESSMENT & PLAN NOTE
Chronic migraines.  Patient follows with neurology in [place]  No daily preventative therapy  She uses Nurtec with great results, usually once a week

## 2024-09-26 NOTE — ASSESSMENT & PLAN NOTE
Intermittent symptoms of lower abdominal pain/suprapubic pressure.  Urinalysis is negative.  No symptoms of dysuria.  Patient was recently evaluated by GI and is scheduled for CT abdomen and pelvis and colonoscopy.  If no pertinent findings on CT-consider follow-up with GYN

## 2024-09-27 ENCOUNTER — TELEPHONE (OUTPATIENT)
Age: 49
End: 2024-09-27

## 2024-09-27 NOTE — TELEPHONE ENCOUNTER
Patients GI provider:  GRABIEL Aragon    Number to return call: [phone]    Reason for call: Mansi from [place] [facility] called in with significant findings from 9/25/2024 Ct of the abdomen and pelvis performed on 9/25/2024. Please reach out to patient, thank you.    Scheduled procedure/appointment date if applicable: Apt/procedure 10/31/2024

## 2024-10-01 DIAGNOSIS — R10.30 LOWER ABDOMINAL PAIN: Primary | ICD-10-CM

## 2024-10-01 DIAGNOSIS — R10.2 PELVIC PAIN: ICD-10-CM

## 2024-10-10 ENCOUNTER — APPOINTMENT (OUTPATIENT)
Dept: LAB | Facility: CLINIC | Age: 49
End: 2024-10-10
Payer: COMMERCIAL

## 2024-10-10 LAB
25(OH)D3 SERPL-MCNC: 39.3 NG/ML (ref 30–100)
CHOLEST SERPL-MCNC: 225 MG/DL
HDLC SERPL-MCNC: 55 MG/DL
LDLC SERPL CALC-MCNC: 151 MG/DL (ref 0–100)
TRIGL SERPL-MCNC: 95 MG/DL
TSH SERPL DL<=0.05 MIU/L-ACNC: 1.1 UIU/ML (ref 0.45–4.5)

## 2024-10-10 PROCEDURE — 84443 ASSAY THYROID STIM HORMONE: CPT

## 2024-10-10 PROCEDURE — 82306 VITAMIN D 25 HYDROXY: CPT

## 2024-10-10 PROCEDURE — 36415 COLL VENOUS BLD VENIPUNCTURE: CPT

## 2024-10-10 PROCEDURE — 80061 LIPID PANEL: CPT

## 2024-10-14 ENCOUNTER — TELEPHONE (OUTPATIENT)
Dept: FAMILY MEDICINE CLINIC | Facility: CLINIC | Age: 49
End: 2024-10-14

## 2024-10-14 NOTE — TELEPHONE ENCOUNTER
Please contact patient regarding her concerns that omeprazole is increasing cholesterol.     I do not believe that omeprazole is increasing cholesterol.   Omeprazole is ordered by her gastroenterology team, and I see she has an appointment with them at the end of the month.   Please discuss the possibility of discontinuing omeprazole with GI team.

## 2024-10-16 DIAGNOSIS — G47.09 OTHER INSOMNIA: ICD-10-CM

## 2024-10-16 RX ORDER — TRAZODONE HYDROCHLORIDE 50 MG/1
50 TABLET, FILM COATED ORAL
Qty: 90 TABLET | Refills: 1 | Status: SHIPPED | OUTPATIENT
Start: 2024-10-16

## 2024-10-17 ENCOUNTER — ANESTHESIA EVENT (OUTPATIENT)
Dept: ANESTHESIOLOGY | Facility: HOSPITAL | Age: 49
End: 2024-10-17

## 2024-10-17 ENCOUNTER — ANESTHESIA (OUTPATIENT)
Dept: ANESTHESIOLOGY | Facility: HOSPITAL | Age: 49
End: 2024-10-17

## 2024-10-22 ENCOUNTER — TELEPHONE (OUTPATIENT)
Dept: GASTROENTEROLOGY | Facility: MEDICAL CENTER | Age: 49
End: 2024-10-22

## 2024-10-22 NOTE — TELEPHONE ENCOUNTER
Called and spoke to patient to confirm procedure for 10/31 with Dr. Jaiyeola at [facility]. Patient has confirmed and has no questions

## 2024-10-30 RX ORDER — SODIUM CHLORIDE 9 MG/ML
125 INJECTION, SOLUTION INTRAVENOUS CONTINUOUS
Status: CANCELLED | OUTPATIENT
Start: 2024-10-30

## 2024-10-31 ENCOUNTER — HOSPITAL ENCOUNTER (OUTPATIENT)
Dept: GASTROENTEROLOGY | Facility: MEDICAL CENTER | Age: 49
Setting detail: OUTPATIENT SURGERY
End: 2024-10-31
Payer: COMMERCIAL

## 2024-10-31 ENCOUNTER — ANESTHESIA EVENT (OUTPATIENT)
Dept: GASTROENTEROLOGY | Facility: MEDICAL CENTER | Age: 49
End: 2024-10-31
Payer: COMMERCIAL

## 2024-10-31 ENCOUNTER — ANESTHESIA (OUTPATIENT)
Dept: GASTROENTEROLOGY | Facility: MEDICAL CENTER | Age: 49
End: 2024-10-31
Payer: COMMERCIAL

## 2024-10-31 ENCOUNTER — TELEPHONE (OUTPATIENT)
Age: 49
End: 2024-10-31

## 2024-10-31 VITALS
DIASTOLIC BLOOD PRESSURE: 63 MMHG | SYSTOLIC BLOOD PRESSURE: 106 MMHG | TEMPERATURE: 97.1 F | OXYGEN SATURATION: 100 % | WEIGHT: 100 LBS | HEIGHT: 61 IN | HEART RATE: 65 BPM | RESPIRATION RATE: 18 BRPM | BODY MASS INDEX: 18.88 KG/M2

## 2024-10-31 DIAGNOSIS — K21.9 GASTROESOPHAGEAL REFLUX DISEASE WITHOUT ESOPHAGITIS: Primary | Chronic | ICD-10-CM

## 2024-10-31 DIAGNOSIS — K25.9 MULTIPLE GASTRIC ULCERS: ICD-10-CM

## 2024-10-31 DIAGNOSIS — Z86.0100 HISTORY OF COLON POLYPS: ICD-10-CM

## 2024-10-31 PROBLEM — F12.90 MARIJUANA USE: Status: ACTIVE | Noted: 2024-10-31

## 2024-10-31 LAB
EXT PREGNANCY TEST URINE: NEGATIVE
EXT. CONTROL: NORMAL

## 2024-10-31 PROCEDURE — 43239 EGD BIOPSY SINGLE/MULTIPLE: CPT | Performed by: INTERNAL MEDICINE

## 2024-10-31 PROCEDURE — 88305 TISSUE EXAM BY PATHOLOGIST: CPT | Performed by: PATHOLOGY

## 2024-10-31 PROCEDURE — 88313 SPECIAL STAINS GROUP 2: CPT | Performed by: PATHOLOGY

## 2024-10-31 PROCEDURE — 88342 IMHCHEM/IMCYTCHM 1ST ANTB: CPT | Performed by: PATHOLOGY

## 2024-10-31 PROCEDURE — 88341 IMHCHEM/IMCYTCHM EA ADD ANTB: CPT | Performed by: PATHOLOGY

## 2024-10-31 PROCEDURE — 45385 COLONOSCOPY W/LESION REMOVAL: CPT | Performed by: INTERNAL MEDICINE

## 2024-10-31 PROCEDURE — 81025 URINE PREGNANCY TEST: CPT | Performed by: ANESTHESIOLOGY

## 2024-10-31 RX ORDER — PROPOFOL 10 MG/ML
INJECTION, EMULSION INTRAVENOUS AS NEEDED
Status: DISCONTINUED | OUTPATIENT
Start: 2024-10-31 | End: 2024-10-31

## 2024-10-31 RX ORDER — SODIUM CHLORIDE 9 MG/ML
125 INJECTION, SOLUTION INTRAVENOUS CONTINUOUS
Status: DISCONTINUED | OUTPATIENT
Start: 2024-10-31 | End: 2024-11-04 | Stop reason: HOSPADM

## 2024-10-31 RX ORDER — SODIUM CHLORIDE 9 MG/ML
INJECTION, SOLUTION INTRAVENOUS CONTINUOUS PRN
Status: DISCONTINUED | OUTPATIENT
Start: 2024-10-31 | End: 2024-10-31

## 2024-10-31 RX ORDER — LIDOCAINE HYDROCHLORIDE 20 MG/ML
INJECTION, SOLUTION EPIDURAL; INFILTRATION; INTRACAUDAL; PERINEURAL AS NEEDED
Status: DISCONTINUED | OUTPATIENT
Start: 2024-10-31 | End: 2024-10-31

## 2024-10-31 RX ORDER — SIMETHICONE 40MG/0.6ML
SUSPENSION, DROPS(FINAL DOSAGE FORM)(ML) ORAL AS NEEDED
Status: COMPLETED | OUTPATIENT
Start: 2024-10-31 | End: 2024-10-31

## 2024-10-31 RX ORDER — PROPOFOL 10 MG/ML
INJECTION, EMULSION INTRAVENOUS CONTINUOUS PRN
Status: DISCONTINUED | OUTPATIENT
Start: 2024-10-31 | End: 2024-10-31

## 2024-10-31 RX ADMIN — PROPOFOL 125 MCG/KG/MIN: 10 INJECTION, EMULSION INTRAVENOUS at 08:11

## 2024-10-31 RX ADMIN — Medication 40 MG: at 08:19

## 2024-10-31 RX ADMIN — PROPOFOL 100 MG: 10 INJECTION, EMULSION INTRAVENOUS at 08:09

## 2024-10-31 RX ADMIN — PROPOFOL 50 MG: 10 INJECTION, EMULSION INTRAVENOUS at 08:11

## 2024-10-31 RX ADMIN — SODIUM CHLORIDE: 0.9 INJECTION, SOLUTION INTRAVENOUS at 08:09

## 2024-10-31 RX ADMIN — PROPOFOL 50 MG: 10 INJECTION, EMULSION INTRAVENOUS at 08:20

## 2024-10-31 RX ADMIN — SODIUM CHLORIDE 125 ML/HR: 0.9 INJECTION, SOLUTION INTRAVENOUS at 07:29

## 2024-10-31 RX ADMIN — LIDOCAINE HYDROCHLORIDE 50 MG: 20 INJECTION, SOLUTION EPIDURAL; INFILTRATION; INTRACAUDAL at 08:09

## 2024-10-31 NOTE — TELEPHONE ENCOUNTER
Lyudmila from [facility] calling for clarification on script for omeprazole . Received for tabs, but they have capsules. Advised that script on our end is showing for capsules. Lyudmila confirmed understanding and updated accordingly on her end.

## 2024-10-31 NOTE — ANESTHESIA POSTPROCEDURE EVALUATION
Post-Op Assessment Note    CV Status:  Stable    Pain management: adequate       Mental Status:  Awake   Hydration Status:  Stable   PONV Controlled:  Controlled   Airway Patency:  Patent     Post Op Vitals Reviewed: Yes    No anethesia notable event occurred.    Staff: Anesthesiologist           Last Filed PACU Vitals:  Vitals Value Taken Time   Temp     Pulse 68 10/31/24 0843   BP 93/51 10/31/24 0843   Resp 16 10/31/24 0843   SpO2 100 % 10/31/24 0843       Modified Shailesh:  Activity: 2 (10/31/2024  8:45 AM)  Respiration: 2 (10/31/2024  8:45 AM)  Circulation: 1 (10/31/2024  8:45 AM)  Consciousness: 1 (10/31/2024  8:45 AM)  Oxygen Saturation: 2 (10/31/2024  8:45 AM)  Modified Shailesh Score: 8 (10/31/2024  8:45 AM)

## 2024-10-31 NOTE — TELEPHONE ENCOUNTER
PA for omeprazole 20 mg SUBMITTED     via    [facility] hospital-KEY: [id number]  []Surescripts-Case ID #   []Availity-Auth ID # NDC #   []Faxed to plan   []Other website   []Phone call Case ID #     Office notes sent, clinical questions answered. Awaiting determination    Turnaround time for your insurance to make a decision on your Prior Authorization can take 7-21 business days.

## 2024-10-31 NOTE — ANESTHESIA POSTPROCEDURE EVALUATION
Post-Op Assessment Note    CV Status:  Stable  Pain Score: 0    Pain management: adequate       Mental Status:  Alert and awake   Hydration Status:  Euvolemic   PONV Controlled:  Controlled   Airway Patency:  Patent     Post Op Vitals Reviewed: Yes    No anethesia notable event occurred.    Staff: CRNA           Last Filed PACU Vitals:  Vitals Value Taken Time   Temp     Pulse 68 10/31/24 0843   BP 93/51 10/31/24 0843   Resp 16 10/31/24 0843   SpO2 100 % 10/31/24 0843       Modified Shailesh:  Activity: 2 (10/31/2024  8:45 AM)  Respiration: 2 (10/31/2024  8:45 AM)  Circulation: 1 (10/31/2024  8:45 AM)  Consciousness: 1 (10/31/2024  8:45 AM)  Oxygen Saturation: 2 (10/31/2024  8:45 AM)  Modified Shailesh Score: 8 (10/31/2024  8:45 AM)

## 2024-10-31 NOTE — TELEPHONE ENCOUNTER
----- Message from Diana Jaiyeola, MD sent at 10/31/2024  8:45 AM EDT -----  Please start prior authorization for omeprazole 20 mg daily.  Thank you.

## 2024-10-31 NOTE — ANESTHESIA PREPROCEDURE EVALUATION
Procedure:  EGD  COLONOSCOPY    Relevant Problems   CARDIO   (+) Headache, variant migraine      ENDO   (+) Hypothyroidism      GI/HEPATIC   (+) Gastroesophageal reflux disease      NEURO/PSYCH   (+) Headache, variant migraine      Nervous and Auditory   (+) Hearing loss      Behavioral Health   (+) Marijuana use (CBD gummies at night)      Ear/Nose/Throat   (+) Nasal septal deviation      Obstetrics/Gynecology   (+) Perimenopause      Other   (+) Dyslipidemia        Physical Exam    Airway    Mallampati score: I         Dental   No notable dental hx     Cardiovascular  Rhythm: regular, Rate: normal    Pulmonary   Breath sounds clear to auscultation    Other Findings  post-pubertal.      Anesthesia Plan  ASA Score- 2     Anesthesia Type- IV sedation with anesthesia with ASA Monitors.         Additional Monitors:     Airway Plan:            Plan Factors-Exercise tolerance (METS): >4 METS.    Chart reviewed.   Existing labs reviewed.     Patient is not a current smoker.      Obstructive sleep apnea risk education given perioperatively.        Induction- intravenous.    Postoperative Plan-     Perioperative Resuscitation Plan - Level 1 - Full Code.       Informed Consent- Anesthetic plan and risks discussed with patient.

## 2024-10-31 NOTE — H&P
H&P - Gastroenterology   Name: Lilian Hernandez 48 y.o. female I MRN: [id number]  Unit/Bed#:  I Date of Admission: 10/31/2024   Date of Service: 10/31/2024 I Hospital Day: 0     Assessment & Plan   This is a 48 y.o. year old female here for egd/colonoscopy, and she is stable and optimized for her procedure.    History of Present Illness    Lilian Hernandez is a 48 y.o. year old female who presents for history of gastric ulcer history of colon polyps    REVIEW OF SYSTEMS: Per the HPI, and otherwise unremarkable.    Historical Information   Past Medical History:   Diagnosis Date    Abnormal Pap smear of cervix     Allergic     Cluster headache     Colon polyp     Disease of thyroid gland     GERD (gastroesophageal reflux disease)     Headache(784.0)     Migraine    Headache, tension-type     HL (hearing loss)     Sudden hearing loss hearing mostly came back    Hypothyroidism     Migraine      Past Surgical History:   Procedure Laterality Date    ANKLE SURGERY Left 03/2020    ANTERIOR CRUCIATE LIGAMENT REPAIR Right 2007    COLONOSCOPY      FL INJECTION LEFT HIP (ARTHROGRAM)  08/23/2021    HIP SURGERY Left 2014    nerve entrapment release    TENDON REPAIR  04/06    ACL Repair    WRIST SURGERY Right 03/2009    Dr Cifuentes    WRIST SURGERY Right 02/07/2020     Social History     Tobacco Use    Smoking status: Never     Passive exposure: Past    Smokeless tobacco: Never   Vaping Use    Vaping status: Never Used   Substance and Sexual Activity    Alcohol use: Not Currently     Comment: Social     Drug use: Yes     Comment: CBD gummies    Sexual activity: Not Currently     Partners: Male     Birth control/protection: Other     Comment: Birth Control loloestrin     E-Cigarette/Vaping    E-Cigarette Use Never User      E-Cigarette/Vaping Substances    Nicotine No     THC No     CBD No     Flavoring No     Other No     Unknown No      Family history non-contributory    Meds/Allergies     Current Outpatient Medications:      levothyroxine 75 mcg tablet    Lo Loestrin Fe 1 MG-10 MCG / 10 MCG TABS    multivitamin (THERAGRAN) TABS    omeprazole (PriLOSEC) 40 MG capsule    rimegepant sulfate (NURTEC) 75 mg TBDP    EPINEPHrine (EPIPEN) 0.3 mg/0.3 mL SOAJ    famotidine (Pepcid) 40 MG tablet    polyethylene glycol (MiraLax) 17 GM/SCOOP powder    traZODone (DESYREL) 50 mg tablet    Current Facility-Administered Medications:     sodium chloride 0.9 % infusion, 125 mL/hr, Intravenous, Continuous, 125 mL/hr at 10/31/24 0729  Allergies   Allergen Reactions    Bee Venom Anaphylaxis    Codeine      Other reaction(s): Palpitations  Reaction Date: 03Aug2011;     Medical Tape Dermatitis    Molds & Smuts     Wound Dressing Adhesive Rash     Redness         Objective :  Temp:  [97.1 °F (36.2 °C)] 97.1 °F (36.2 °C)  HR:  [68] 68  BP: (123)/(74) 123/74  Resp:  [16] 16  SpO2:  [100 %] 100 %  O2 Device: None (Room air)    Physical Exam  Gen: NAD  Head: NCAT  CV: RRR  CHEST: Clear  ABD: soft, NT/ND  EXT: no edema

## 2024-11-01 NOTE — TELEPHONE ENCOUNTER
PA for OMEPRAZOLE APPROVED     Date(s) approved 10/1/2024-10/31/2025    Case #    Patient advised by          [x]Uploadcarehart Message  []Phone call   []LMOM  []L/M to call office as no active Communication consent on file  []Unable to leave detailed message as VM not approved on Communication consent       Pharmacy advised by    [x]Fax  []Phone call    Approval letter scanned into Media Yes

## 2024-11-05 PROCEDURE — 88342 IMHCHEM/IMCYTCHM 1ST ANTB: CPT | Performed by: PATHOLOGY

## 2024-11-05 PROCEDURE — 88305 TISSUE EXAM BY PATHOLOGIST: CPT | Performed by: PATHOLOGY

## 2024-11-05 PROCEDURE — 88341 IMHCHEM/IMCYTCHM EA ADD ANTB: CPT | Performed by: PATHOLOGY

## 2024-11-05 PROCEDURE — 88313 SPECIAL STAINS GROUP 2: CPT | Performed by: PATHOLOGY

## 2024-11-05 NOTE — RESULT ENCOUNTER NOTE
Results relayed via Mychart  Subcentimeter tubular adenoma.  Repeat colonoscopy in 5 years due to history of advanced adenomatous polyps.  Otherwise unremarkable pathology

## 2024-11-07 ENCOUNTER — OFFICE VISIT (OUTPATIENT)
Dept: OBGYN CLINIC | Facility: MEDICAL CENTER | Age: 49
End: 2024-11-07
Payer: COMMERCIAL

## 2024-11-07 ENCOUNTER — APPOINTMENT (OUTPATIENT)
Dept: RADIOLOGY | Facility: MEDICAL CENTER | Age: 49
End: 2024-11-07
Payer: COMMERCIAL

## 2024-11-07 VITALS
BODY MASS INDEX: 18.58 KG/M2 | HEIGHT: 61 IN | WEIGHT: 98.4 LBS | DIASTOLIC BLOOD PRESSURE: 81 MMHG | HEART RATE: 82 BPM | SYSTOLIC BLOOD PRESSURE: 119 MMHG

## 2024-11-07 DIAGNOSIS — M25.561 RIGHT KNEE PAIN, UNSPECIFIED CHRONICITY: ICD-10-CM

## 2024-11-07 DIAGNOSIS — M22.8X1 PATELLAR TRACKING DISORDER OF RIGHT KNEE: ICD-10-CM

## 2024-11-07 DIAGNOSIS — Z01.89 ENCOUNTER FOR LOWER EXTREMITY COMPARISON IMAGING STUDY: ICD-10-CM

## 2024-11-07 DIAGNOSIS — S86.911A KNEE STRAIN, RIGHT, INITIAL ENCOUNTER: ICD-10-CM

## 2024-11-07 DIAGNOSIS — M23.91 INTERNAL DERANGEMENT OF RIGHT KNEE: Primary | ICD-10-CM

## 2024-11-07 DIAGNOSIS — M17.11 PRIMARY OSTEOARTHRITIS OF RIGHT KNEE: ICD-10-CM

## 2024-11-07 PROCEDURE — 99214 OFFICE O/P EST MOD 30 MIN: CPT | Performed by: ORTHOPAEDIC SURGERY

## 2024-11-07 PROCEDURE — 73564 X-RAY EXAM KNEE 4 OR MORE: CPT

## 2024-11-07 NOTE — PROGRESS NOTES
Assessment & Plan     1. Internal derangement of right knee    2. Primary osteoarthritis of right knee    3. Patellar tracking disorder of right knee    4. Knee strain, right, initial encounter    5. Right knee pain, unspecified chronicity    6. Encounter for lower extremity comparison imaging study      Orders Placed This Encounter   Procedures    XR knee 4+ vw right injury    XR knee 1 or 2 vw left    MRI knee right  wo contrast    Ambulatory Referral to Physical Therapy       The patient has mild right knee arthritis, lateral patellar tracking, right knee strain, possible meniscus tear.. We discussed treatment options as well as risks and benefits of treatment options.  Physical therapy referral was given to patient on today's visit.  MRI of right knee was ordered at today's visit for patient to obtain 4 weeks after she initiates physical therapy.  Can take Tylenol 1,000mg by mouth every 8 hours as needed for pain.  Do not exceed 3,000mg per day.  No NSAIDs due to GI upset  Discussed with patient that she may follow-up with myself, Dr. Gutiérrez, or Dr. Brooks if her symptoms worsen and for MRI review.    Return for appt with me or ortho .  patient aware of her options..    I answered all of the patient's questions during the visit and provided education of the patient's condition during the visit.  The patient verbalized understanding of the information given and agrees with the plan.  This note was dictated using [employer] software.  It may contain errors including improperly dictated words.  Please contact physician directly for any questions.    History of Present Illness   Chief complaint:   Chief Complaint   Patient presents with    Right Knee - Pain       HPI: Lilian Hernandez is a 48 y.o. female that c/o right knee pain.    Length of time knee pain has been present: 2 weeks   Any falls or trauma associated with onset of pain: went to squat   Location of pain: posterior   Intermittent or  constant: intermittent  Description of pain: achy pain  Aggravating factors: extension, sit to stand, steps  Instability or locking: feels unstable  Pain medication that has been tried: tylenol   Topical mediation that has been tried: topical  Has heat/ice/elevation been tried: yes  Can NSAIDs be taken?  If not why?: no due to stomach upset  Has PT or home exercises been tried?: HEP for quads and glutes strengthening  Has bracing been tried? OTC or rx?  No, does taping which helps  Have injections been tried?  Steroid/visco?: CSI in April no relief for separate issue   Any history of surgery on that knee?:  no       ROS:    See HPI for musculoskeletal review.   All other systems reviewed are negative     Historical Information   Past Medical History:   Diagnosis Date    Abnormal Pap smear of cervix     Allergic     Cluster headache     Colon polyp     Disease of thyroid gland     GERD (gastroesophageal reflux disease)     Headache(784.0)     Migraine    Headache, tension-type     HL (hearing loss)     Sudden hearing loss hearing mostly came back    Hypothyroidism     Migraine      Past Surgical History:   Procedure Laterality Date    ANKLE SURGERY Left 03/2020    ANTERIOR CRUCIATE LIGAMENT REPAIR Right 2007    COLONOSCOPY      FL INJECTION LEFT HIP (ARTHROGRAM)  08/23/2021    HIP SURGERY Left 2014    nerve entrapment release    TENDON REPAIR  04/06    ACL Repair    WRIST SURGERY Right 03/2009    Dr Cifuentes    WRIST SURGERY Right 02/07/2020     Social History   Social History     Substance and Sexual Activity   Alcohol Use Not Currently    Comment: Social      Social History     Substance and Sexual Activity   Drug Use Yes    Comment: CBD gummies     Social History     Tobacco Use   Smoking Status Never    Passive exposure: Past   Smokeless Tobacco Never     Family History:   Family History   Problem Relation Age of Onset    Hypothyroidism Mother     Migraines Mother     Thyroid disease Mother     Transient ischemic  attack Mother     Hyperlipidemia Father         Pure    Anxiety disorder Sister     Hyperlipidemia Sister     Ovarian cancer Maternal Grandmother 50        Age 76    Cancer Maternal Grandmother         Ovarian at 76    Lymphoma Maternal Grandfather 82    Stomach cancer Paternal Grandmother 90    Cancer Paternal Grandmother         Pancreatic at 92    No Known Problems Paternal Grandfather     Prostate cancer Maternal Uncle     Prostate cancer Paternal Uncle     Breast cancer Other        Current Outpatient Medications on File Prior to Visit   Medication Sig Dispense Refill    EPINEPHrine (EPIPEN) 0.3 mg/0.3 mL SOAJ INJECT 0.3 ML (0.3 MG TOTAL) INTO A MUSCLE AS NEEDED FOR ANAPHYLAXIS 2 each 1    famotidine (Pepcid) 40 MG tablet Take 40 mg by mouth daily      levothyroxine 75 mcg tablet TAKE 1 TABLET BY MOUTH EVERY DAY 90 tablet 0    Lo Loestrin Fe 1 MG-10 MCG / 10 MCG TABS Take 1 tablet by mouth daily 84 tablet 4    multivitamin (THERAGRAN) TABS Take 1 tablet by mouth daily      omeprazole (PriLOSEC) 20 mg delayed release capsule Take 1 capsule (20 mg total) by mouth daily before breakfast 90 capsule 3    polyethylene glycol (MiraLax) 17 GM/SCOOP powder Take 238 g by mouth once for 1 dose Take 238 g my mouth. Use as directed 238 g 0    rimegepant sulfate (NURTEC) 75 mg TBDP Take one NURTEC 75 mg at onset under tongue. Limit 1 in 24 hours 8 tablet 11    traZODone (DESYREL) 50 mg tablet TAKE 1 TABLET (50 MG TOTAL) BY MOUTH DAILY AT BEDTIME AS NEEDED FOR SLEEP 90 tablet 1     No current facility-administered medications on file prior to visit.     Allergies   Allergen Reactions    Bee Venom Anaphylaxis    Codeine      Other reaction(s): Palpitations  Reaction Date: 03Aug2011;     Medical Tape Dermatitis    Molds & Smuts     Wound Dressing Adhesive Rash     Redness         Current Outpatient Medications on File Prior to Visit   Medication Sig Dispense Refill    EPINEPHrine (EPIPEN) 0.3 mg/0.3 mL SOAJ INJECT 0.3 ML (0.3  "MG TOTAL) INTO A MUSCLE AS NEEDED FOR ANAPHYLAXIS 2 each 1    famotidine (Pepcid) 40 MG tablet Take 40 mg by mouth daily      levothyroxine 75 mcg tablet TAKE 1 TABLET BY MOUTH EVERY DAY 90 tablet 0    Lo Loestrin Fe 1 MG-10 MCG / 10 MCG TABS Take 1 tablet by mouth daily 84 tablet 4    multivitamin (THERAGRAN) TABS Take 1 tablet by mouth daily      omeprazole (PriLOSEC) 20 mg delayed release capsule Take 1 capsule (20 mg total) by mouth daily before breakfast 90 capsule 3    polyethylene glycol (MiraLax) 17 GM/SCOOP powder Take 238 g by mouth once for 1 dose Take 238 g my mouth. Use as directed 238 g 0    rimegepant sulfate (NURTEC) 75 mg TBDP Take one NURTEC 75 mg at onset under tongue. Limit 1 in 24 hours 8 tablet 11    traZODone (DESYREL) 50 mg tablet TAKE 1 TABLET (50 MG TOTAL) BY MOUTH DAILY AT BEDTIME AS NEEDED FOR SLEEP 90 tablet 1     No current facility-administered medications on file prior to visit.       Objective   Vitals: Blood pressure 119/81, pulse 82, height 5' 1\" (1.549 m), weight 44.6 kg (98 lb 6.4 oz), not currently breastfeeding.,Body mass index is 18.59 kg/m².    PE:  AAOx 3  WDWN  Hearing intact, no drainage from eyes  Regular rate  no audible wheezing  no abdominal distension  LE compartments soft, skin intact    rightknee:    Appearance:  no swelling   No ecchymosis  no obvious joint deformity   No effusion  Palpation/Tenderness:  No TTP over medial joint line  No TTP over lateral joint line   No TTP over patella  No TTP over patellar tendon  No TTP over pes anserine bursa  Active Range of Motion:  AROM: 0-125  Special Tests:  Medial Jay's Test:  negative  Lateral Jay's Test: Positive  Apley's compression test:  Negative  Lachman's Test:  negative  Anterior Drawer Test:  Negative  Patellar grind:  Negative  Valgus Stress Test:  negative  Varus Stress Test:  negative     No ipsilateral hip pain with ROM    rightLE:    Sensation grossly intact  Palpable 2+ pulse  AT/GS/EHL " intact    Imaging Studies: Results Review Statement: I personally reviewed the following image studies in PACS and associated radiology reports: xray(s). My interpretation of the radiology images/reports is: Mild right knee osteoarthritis.      Scribe Attestation      I,:  William Willis am acting as a scribe while in the presence of the attending physician.:       I,:  Daniella Kearney DO personally performed the services described in this documentation    as scribed in my presence.:

## 2024-11-10 DIAGNOSIS — E03.9 HYPOTHYROIDISM, UNSPECIFIED TYPE: ICD-10-CM

## 2024-11-12 RX ORDER — LEVOTHYROXINE SODIUM 75 UG/1
75 TABLET ORAL DAILY
Qty: 90 TABLET | Refills: 1 | Status: SHIPPED | OUTPATIENT
Start: 2024-11-12

## 2024-11-23 ENCOUNTER — HOSPITAL ENCOUNTER (OUTPATIENT)
Dept: RADIOLOGY | Facility: HOSPITAL | Age: 49
Discharge: HOME | End: 2024-11-23
Payer: COMMERCIAL

## 2024-11-23 DIAGNOSIS — M23.91 INTERNAL DERANGEMENT OF RIGHT KNEE: ICD-10-CM

## 2024-11-23 PROCEDURE — 73721 MRI JNT OF LWR EXTRE W/O DYE: CPT

## 2024-12-02 ENCOUNTER — OFFICE VISIT (OUTPATIENT)
Dept: OBGYN CLINIC | Facility: MEDICAL CENTER | Age: 49
End: 2024-12-02
Payer: COMMERCIAL

## 2024-12-02 VITALS
HEIGHT: 61 IN | DIASTOLIC BLOOD PRESSURE: 70 MMHG | HEART RATE: 72 BPM | SYSTOLIC BLOOD PRESSURE: 103 MMHG | BODY MASS INDEX: 18.88 KG/M2 | WEIGHT: 100 LBS

## 2024-12-02 DIAGNOSIS — S83.241A ACUTE MEDIAL MENISCUS TEAR OF RIGHT KNEE, INITIAL ENCOUNTER: Primary | ICD-10-CM

## 2024-12-02 PROCEDURE — 99214 OFFICE O/P EST MOD 30 MIN: CPT | Performed by: ORTHOPAEDIC SURGERY

## 2024-12-02 RX ORDER — TRANEXAMIC ACID 10 MG/ML
1000 INJECTION, SOLUTION INTRAVENOUS ONCE
OUTPATIENT
Start: 2024-12-02 | End: 2024-12-02

## 2024-12-02 RX ORDER — CEFAZOLIN SODIUM 2 G/50ML
2000 SOLUTION INTRAVENOUS ONCE
OUTPATIENT
Start: 2024-12-02 | End: 2024-12-02

## 2024-12-02 RX ORDER — ACETAMINOPHEN 325 MG/1
325 TABLET ORAL EVERY 6 HOURS PRN
COMMUNITY

## 2024-12-02 RX ORDER — CHLORHEXIDINE GLUCONATE ORAL RINSE 1.2 MG/ML
15 SOLUTION DENTAL ONCE
OUTPATIENT
Start: 2024-12-02 | End: 2024-12-02

## 2024-12-02 NOTE — PROGRESS NOTES
Orthopaedic Surgery - Office Note  Lilian Hernandez (48 y.o. female)   : 1975   MRN: [id number]  Encounter Date: 2024    Assessment / Plan  Right knee medial mensicus root tear    The diagnosis and treatment options were reviewed.  The patient wishes to proceed with right knee arthroscopy with meniscectomy vs repair.  Scheduled for 2024   The risks, benefits, and alternatives were discussed.  Electronic consent was obtained.  Continue with HEP   Avoid painful activity   Ice, heat and anti-inflammatories prn   Reviewed notes from Dr. Kearney  Reviewed MRI during the visit   Follow-up:  Return for 3-4 days post op with Dennys .      Chief Complaint / Date of Onset  Right knee pain and catching   Injury Mechanism / Date  She was trail running in October and squatted down to pick something up and felt a sharp pain when she stood up  Surgery / Date  None    History of Present Illness   Lilian Hernandez is a 48 y.o. female who presents for consultation of right knee pain at the request of Dr. Kearney. She states since the injury occurred her pain has become more consistent. She describes her pain as being deep, posterior and along the joint line. She has increased pain with activity and does endorse a catching sensation in her knee. She feels that her knee catches a little going into full extension. She has completed PT and does continue with her HEP which is offering her limited relief. She has not been able to run due to pain, when she has tried to run she does get swelling.     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  All other  "systems were reviewed and are negative.      Physical Exam  /70   Pulse 72   Ht 5' 1\" (1.549 m)   Wt 45.4 kg (100 lb)   BMI 18.89 kg/m²   Cons: Appears well.  No apparent distress.  Psych: Alert. Oriented x3.  Mood and affect normal.  Eyes: PERRLA, EOMI  Resp: Normal effort.  No audible wheezing or stridor.  CV: Palpable pulse.  No discernable arrhythmia.  No LE edema.  Lymph:  No palpable cervical, axillary, or inguinal lymphadenopathy.  Skin: Warm.  No palpable masses.  No visible lesions.  Neuro: Normal muscle tone.  Normal and symmetric DTR's.     Right Knee Exam  Alignment:  Normal knee alignment.  Inspection:  No swelling. No ecchymosis.  Palpation:   mild medial joint line tenderness. No effusion.  ROM:  Knee Extension 0. Knee Flexion 130.  Strength:  Able to actively extend knee against gravity.  Stability:  No objective knee instability. Stable Varus / Valgus stress, Lachman, and Posterior drawer.  Tests:  (+) Jay.  Patella:  Normal patellar mobility.  Neurovascular:  Sensation intact in DP/SP/Limon/Sa/T nerve distributions.  2+ DP & PT pulses.  Gait:  Normal.       Studies Reviewed  I have personally reviewed pertinent films in PACS.  XR of right knee- images from 11/07/2024 showing no abnormalities or fractures   MRI of right knee- images from 11/23/2024 showing medial meniscus root tear    Procedures  No procedures today.    Medical, Surgical, Family, and Social History  The patient's medical history, family history, and social history, were reviewed and updated as appropriate.    Past Medical History:   Diagnosis Date    Abnormal Pap smear of cervix     Allergic     Cluster headache     Colon polyp     Disease of thyroid gland     GERD (gastroesophageal reflux disease)     Headache(784.0)     Migraine    Headache, tension-type     HL (hearing loss)     Sudden hearing loss hearing mostly came back    Hypothyroidism     Migraine        Past Surgical History:   Procedure Laterality Date    " ANKLE SURGERY Left 03/2020    ANTERIOR CRUCIATE LIGAMENT REPAIR Right 2007    COLONOSCOPY      FL INJECTION LEFT HIP (ARTHROGRAM)  08/23/2021    HIP SURGERY Left 2014    nerve entrapment release    TENDON REPAIR  04/06    ACL Repair    WRIST SURGERY Right 03/2009    Dr Cifuentes    WRIST SURGERY Right 02/07/2020       Family History   Problem Relation Age of Onset    Hypothyroidism Mother     Migraines Mother     Thyroid disease Mother     Transient ischemic attack Mother     Hyperlipidemia Father         Pure    Anxiety disorder Sister     Hyperlipidemia Sister     Ovarian cancer Maternal Grandmother 50        Age 76    Cancer Maternal Grandmother         Ovarian at 76    Lymphoma Maternal Grandfather 82    Stomach cancer Paternal Grandmother 90    Cancer Paternal Grandmother         Pancreatic at 92    No Known Problems Paternal Grandfather     Prostate cancer Maternal Uncle     Prostate cancer Paternal Uncle     Breast cancer Other        Social History     Occupational History    Not on file   Tobacco Use    Smoking status: Never     Passive exposure: Past    Smokeless tobacco: Never   Vaping Use    Vaping status: Never Used   Substance and Sexual Activity    Alcohol use: Not Currently     Comment: Social     Drug use: Yes     Comment: CBD gummies    Sexual activity: Not Currently     Partners: Male     Birth control/protection: Other     Comment: Birth Control loloestrin       Allergies   Allergen Reactions    Bee Venom Anaphylaxis    Codeine      Other reaction(s): Palpitations  Reaction Date: 03Aug2011;     Medical Tape Dermatitis    Molds & Smuts     Wound Dressing Adhesive Rash     Redness           Current Outpatient Medications:     acetaminophen (TYLENOL) 325 mg tablet, Take 325 mg by mouth every 6 (six) hours as needed for mild pain, Disp: , Rfl:     famotidine (Pepcid) 40 MG tablet, Take 40 mg by mouth daily, Disp: , Rfl:     levothyroxine 75 mcg tablet, TAKE 1 TABLET BY MOUTH EVERY DAY, Disp: 90 tablet,  Rfl: 1    Lo Loestrin Fe 1 MG-10 MCG / 10 MCG TABS, Take 1 tablet by mouth daily, Disp: 84 tablet, Rfl: 4    multivitamin (THERAGRAN) TABS, Take 1 tablet by mouth daily, Disp: , Rfl:     omeprazole (PriLOSEC) 20 mg delayed release capsule, Take 1 capsule (20 mg total) by mouth daily before breakfast, Disp: 90 capsule, Rfl: 3    rimegepant sulfate (NURTEC) 75 mg TBDP, Take one NURTEC 75 mg at onset under tongue. Limit 1 in 24 hours, Disp: 8 tablet, Rfl: 11    EPINEPHrine (EPIPEN) 0.3 mg/0.3 mL SOAJ, INJECT 0.3 ML (0.3 MG TOTAL) INTO A MUSCLE AS NEEDED FOR ANAPHYLAXIS (Patient not taking: Reported on 12/2/2024), Disp: 2 each, Rfl: 1    polyethylene glycol (MiraLax) 17 GM/SCOOP powder, Take 238 g by mouth once for 1 dose Take 238 g my mouth. Use as directed, Disp: 238 g, Rfl: 0    traZODone (DESYREL) 50 mg tablet, TAKE 1 TABLET (50 MG TOTAL) BY MOUTH DAILY AT BEDTIME AS NEEDED FOR SLEEP, Disp: 90 tablet, Rfl: 1      Marycarmen Chapman    Scribe Attestation      I,:   am acting as a scribe while in the presence of the attending physician.:       I,:   personally performed the services described in this documentation    as scribed in my presence.:

## 2024-12-02 NOTE — H&P (VIEW-ONLY)
Orthopaedic Surgery - Office Note  Lilian Hernandez (48 y.o. female)   : 1975   MRN: [id number]  Encounter Date: 2024    Assessment / Plan  Right knee medial mensicus root tear    The diagnosis and treatment options were reviewed.  The patient wishes to proceed with right knee arthroscopy with meniscectomy vs repair.  Scheduled for 2024   The risks, benefits, and alternatives were discussed.  Electronic consent was obtained.  Continue with HEP   Avoid painful activity   Ice, heat and anti-inflammatories prn   Reviewed notes from Dr. Kearney  Reviewed MRI during the visit   Follow-up:  Return for 3-4 days post op with Dennys .      Chief Complaint / Date of Onset  Right knee pain and catching   Injury Mechanism / Date  She was trail running in October and squatted down to pick something up and felt a sharp pain when she stood up  Surgery / Date  None    History of Present Illness   Lilian Hernandez is a 48 y.o. female who presents for consultation of right knee pain at the request of Dr. Kearney. She states since the injury occurred her pain has become more consistent. She describes her pain as being deep, posterior and along the joint line. She has increased pain with activity and does endorse a catching sensation in her knee. She feels that her knee catches a little going into full extension. She has completed PT and does continue with her HEP which is offering her limited relief. She has not been able to run due to pain, when she has tried to run she does get swelling.     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  All other  "systems were reviewed and are negative.      Physical Exam  /70   Pulse 72   Ht 5' 1\" (1.549 m)   Wt 45.4 kg (100 lb)   BMI 18.89 kg/m²   Cons: Appears well.  No apparent distress.  Psych: Alert. Oriented x3.  Mood and affect normal.  Eyes: PERRLA, EOMI  Resp: Normal effort.  No audible wheezing or stridor.  CV: Palpable pulse.  No discernable arrhythmia.  No LE edema.  Lymph:  No palpable cervical, axillary, or inguinal lymphadenopathy.  Skin: Warm.  No palpable masses.  No visible lesions.  Neuro: Normal muscle tone.  Normal and symmetric DTR's.     Right Knee Exam  Alignment:  Normal knee alignment.  Inspection:  No swelling. No ecchymosis.  Palpation:   mild medial joint line tenderness. No effusion.  ROM:  Knee Extension 0. Knee Flexion 130.  Strength:  Able to actively extend knee against gravity.  Stability:  No objective knee instability. Stable Varus / Valgus stress, Lachman, and Posterior drawer.  Tests:  (+) Jay.  Patella:  Normal patellar mobility.  Neurovascular:  Sensation intact in DP/SP/Limon/Sa/T nerve distributions.  2+ DP & PT pulses.  Gait:  Normal.       Studies Reviewed  I have personally reviewed pertinent films in PACS.  XR of right knee- images from 11/07/2024 showing no abnormalities or fractures   MRI of right knee- images from 11/23/2024 showing medial meniscus root tear    Procedures  No procedures today.    Medical, Surgical, Family, and Social History  The patient's medical history, family history, and social history, were reviewed and updated as appropriate.    Past Medical History:   Diagnosis Date    Abnormal Pap smear of cervix     Allergic     Cluster headache     Colon polyp     Disease of thyroid gland     GERD (gastroesophageal reflux disease)     Headache(784.0)     Migraine    Headache, tension-type     HL (hearing loss)     Sudden hearing loss hearing mostly came back    Hypothyroidism     Migraine        Past Surgical History:   Procedure Laterality Date    " ANKLE SURGERY Left 03/2020    ANTERIOR CRUCIATE LIGAMENT REPAIR Right 2007    COLONOSCOPY      FL INJECTION LEFT HIP (ARTHROGRAM)  08/23/2021    HIP SURGERY Left 2014    nerve entrapment release    TENDON REPAIR  04/06    ACL Repair    WRIST SURGERY Right 03/2009    Dr Cifuentes    WRIST SURGERY Right 02/07/2020       Family History   Problem Relation Age of Onset    Hypothyroidism Mother     Migraines Mother     Thyroid disease Mother     Transient ischemic attack Mother     Hyperlipidemia Father         Pure    Anxiety disorder Sister     Hyperlipidemia Sister     Ovarian cancer Maternal Grandmother 50        Age 76    Cancer Maternal Grandmother         Ovarian at 76    Lymphoma Maternal Grandfather 82    Stomach cancer Paternal Grandmother 90    Cancer Paternal Grandmother         Pancreatic at 92    No Known Problems Paternal Grandfather     Prostate cancer Maternal Uncle     Prostate cancer Paternal Uncle     Breast cancer Other        Social History     Occupational History    Not on file   Tobacco Use    Smoking status: Never     Passive exposure: Past    Smokeless tobacco: Never   Vaping Use    Vaping status: Never Used   Substance and Sexual Activity    Alcohol use: Not Currently     Comment: Social     Drug use: Yes     Comment: CBD gummies    Sexual activity: Not Currently     Partners: Male     Birth control/protection: Other     Comment: Birth Control loloestrin       Allergies   Allergen Reactions    Bee Venom Anaphylaxis    Codeine      Other reaction(s): Palpitations  Reaction Date: 03Aug2011;     Medical Tape Dermatitis    Molds & Smuts     Wound Dressing Adhesive Rash     Redness           Current Outpatient Medications:     acetaminophen (TYLENOL) 325 mg tablet, Take 325 mg by mouth every 6 (six) hours as needed for mild pain, Disp: , Rfl:     famotidine (Pepcid) 40 MG tablet, Take 40 mg by mouth daily, Disp: , Rfl:     levothyroxine 75 mcg tablet, TAKE 1 TABLET BY MOUTH EVERY DAY, Disp: 90 tablet,  Rfl: 1    Lo Loestrin Fe 1 MG-10 MCG / 10 MCG TABS, Take 1 tablet by mouth daily, Disp: 84 tablet, Rfl: 4    multivitamin (THERAGRAN) TABS, Take 1 tablet by mouth daily, Disp: , Rfl:     omeprazole (PriLOSEC) 20 mg delayed release capsule, Take 1 capsule (20 mg total) by mouth daily before breakfast, Disp: 90 capsule, Rfl: 3    rimegepant sulfate (NURTEC) 75 mg TBDP, Take one NURTEC 75 mg at onset under tongue. Limit 1 in 24 hours, Disp: 8 tablet, Rfl: 11    EPINEPHrine (EPIPEN) 0.3 mg/0.3 mL SOAJ, INJECT 0.3 ML (0.3 MG TOTAL) INTO A MUSCLE AS NEEDED FOR ANAPHYLAXIS (Patient not taking: Reported on 12/2/2024), Disp: 2 each, Rfl: 1    polyethylene glycol (MiraLax) 17 GM/SCOOP powder, Take 238 g by mouth once for 1 dose Take 238 g my mouth. Use as directed, Disp: 238 g, Rfl: 0    traZODone (DESYREL) 50 mg tablet, TAKE 1 TABLET (50 MG TOTAL) BY MOUTH DAILY AT BEDTIME AS NEEDED FOR SLEEP, Disp: 90 tablet, Rfl: 1      Marycarmen Chapman    Scribe Attestation      I,:   am acting as a scribe while in the presence of the attending physician.:       I,:   personally performed the services described in this documentation    as scribed in my presence.:

## 2024-12-06 ENCOUNTER — ANESTHESIA (OUTPATIENT)
Age: 49
End: 2024-12-06

## 2024-12-06 ENCOUNTER — ANESTHESIA EVENT (OUTPATIENT)
Age: 49
End: 2024-12-06

## 2024-12-06 ENCOUNTER — ANESTHESIA EVENT (OUTPATIENT)
Age: 49
End: 2024-12-06
Payer: COMMERCIAL

## 2024-12-12 ENCOUNTER — HOSPITAL ENCOUNTER (OUTPATIENT)
Dept: RADIOLOGY | Facility: HOSPITAL | Age: 49
Discharge: HOME | End: 2024-12-12
Payer: COMMERCIAL

## 2024-12-12 VITALS — HEIGHT: 61 IN | WEIGHT: 102 LBS | BODY MASS INDEX: 19.26 KG/M2

## 2024-12-12 DIAGNOSIS — Z12.31 ENCOUNTER FOR SCREENING MAMMOGRAM FOR MALIGNANT NEOPLASM OF BREAST: ICD-10-CM

## 2024-12-12 PROCEDURE — 77063 BREAST TOMOSYNTHESIS BI: CPT

## 2024-12-12 PROCEDURE — 77067 SCR MAMMO BI INCL CAD: CPT

## 2024-12-12 RX ORDER — FLUTICASONE PROPIONATE 50 MCG
1 SPRAY, SUSPENSION (ML) NASAL DAILY
COMMUNITY

## 2024-12-12 NOTE — PRE-PROCEDURE INSTRUCTIONS
Pre-Surgery Instructions:   Medication Instructions    EPINEPHrine (EPIPEN) 0.3 mg/0.3 mL SOAJ Uses PRN- OK to take day of surgery    fluticasone (FLONASE) 50 mcg/act nasal spray Uses PRN- OK to take day of surgery    levothyroxine 75 mcg tablet Take day of surgery.    Lo Loestrin Fe 1 MG-10 MCG / 10 MCG TABS Take day of surgery.    multivitamin (THERAGRAN) TABS Stop taking 7 days prior to surgery.    omeprazole (PriLOSEC) 20 mg delayed release capsule Take day of surgery.    rimegepant sulfate (NURTEC) 75 mg TBDP Uses PRN- OK to take day of surgery   Medication instructions for day surgery reviewed. Please use only a sip of water to take your instructed medications. Avoid all over the counter vitamins, supplements and NSAIDS for one week prior to surgery per anesthesia guidelines. Tylenol is ok to take as needed.     You will receive a call one business day prior to surgery with an arrival time and hospital directions. If your surgery is scheduled on a Monday, the hospital will be calling you on the Friday prior to your surgery. If you have not heard from anyone by 8pm, please call the hospital supervisor through the hospital  at [phone].  or [place] [phone]).    Do not eat or drink anything after midnight the night before your surgery, including candy, mints, lifesavers, or chewing gum. Do not drink alcohol 24hrs before your surgery. Try not to smoke at least 24hrs before your surgery.       Follow the pre surgery showering instructions as listed in the “My Surgical Experience Booklet” or otherwise provided by your surgeon's office. Do not use a blade to shave the surgical area 1 week before surgery. It is okay to use a clean electric clippers up to 24 hours before surgery. Do not apply any lotions, creams, including makeup, cologne, deodorant, or perfumes after showering on the day of your surgery. Do not use dry shampoo, hair spray, hair gel, or any type of hair products.     No contact  lenses, eye make-up, or artificial eyelashes. Remove nail polish, including gel polish, and any artificial, gel, or acrylic nails if possible. Remove all jewelry including rings and body piercing jewelry.     Wear causal clothing that is easy to take on and off. Consider your type of surgery.    Keep any valuables, jewelry, piercings at home. Please bring any specially ordered equipment (sling, braces) if indicated.    Arrange for a responsible person to drive you to and from the hospital on the day of your surgery. Please confirm the visitor policy for the day of your procedure when you receive your phone call with an arrival time.     Call the surgeon's office with any new illnesses, exposures, or additional questions prior to surgery.    Please reference your “My Surgical Experience Booklet” for additional information to prepare for your upcoming surgery.

## 2024-12-19 ENCOUNTER — RESULTS FOLLOW-UP (OUTPATIENT)
Dept: OBGYN CLINIC | Facility: CLINIC | Age: 49
End: 2024-12-19

## 2024-12-20 ENCOUNTER — ANESTHESIA (OUTPATIENT)
Age: 49
End: 2024-12-20
Payer: COMMERCIAL

## 2024-12-20 ENCOUNTER — HOSPITAL ENCOUNTER (OUTPATIENT)
Age: 49
Setting detail: OUTPATIENT SURGERY
Discharge: HOME | End: 2024-12-20
Attending: ORTHOPAEDIC SURGERY | Admitting: ORTHOPAEDIC SURGERY
Payer: COMMERCIAL

## 2024-12-20 VITALS
DIASTOLIC BLOOD PRESSURE: 71 MMHG | RESPIRATION RATE: 18 BRPM | HEART RATE: 77 BPM | SYSTOLIC BLOOD PRESSURE: 111 MMHG | TEMPERATURE: 97.6 F | OXYGEN SATURATION: 100 % | BODY MASS INDEX: 18.73 KG/M2 | HEIGHT: 61 IN | WEIGHT: 99.2 LBS

## 2024-12-20 DIAGNOSIS — S83.241A ACUTE MEDIAL MENISCUS TEAR OF RIGHT KNEE, INITIAL ENCOUNTER: Primary | ICD-10-CM

## 2024-12-20 LAB
EXT PREGNANCY TEST URINE: NEGATIVE
EXT. CONTROL: NORMAL

## 2024-12-20 PROCEDURE — 29875 ARTHRS KNEE SURG SYNVCT LMTD: CPT | Performed by: ORTHOPAEDIC SURGERY

## 2024-12-20 PROCEDURE — 81025 URINE PREGNANCY TEST: CPT | Performed by: ORTHOPAEDIC SURGERY

## 2024-12-20 RX ORDER — SODIUM CHLORIDE, SODIUM LACTATE, POTASSIUM CHLORIDE, CALCIUM CHLORIDE 600; 310; 30; 20 MG/100ML; MG/100ML; MG/100ML; MG/100ML
INJECTION, SOLUTION INTRAVENOUS CONTINUOUS PRN
Status: DISCONTINUED | OUTPATIENT
Start: 2024-12-20 | End: 2024-12-20

## 2024-12-20 RX ORDER — LIDOCAINE HYDROCHLORIDE 10 MG/ML
INJECTION, SOLUTION EPIDURAL; INFILTRATION; INTRACAUDAL; PERINEURAL AS NEEDED
Status: DISCONTINUED | OUTPATIENT
Start: 2024-12-20 | End: 2024-12-20

## 2024-12-20 RX ORDER — PROPOFOL 10 MG/ML
INJECTION, EMULSION INTRAVENOUS AS NEEDED
Status: DISCONTINUED | OUTPATIENT
Start: 2024-12-20 | End: 2024-12-20

## 2024-12-20 RX ORDER — NAPROXEN 500 MG/1
500 TABLET ORAL 2 TIMES DAILY WITH MEALS
Qty: 60 TABLET | Refills: 0 | Status: SHIPPED | OUTPATIENT
Start: 2024-12-20

## 2024-12-20 RX ORDER — DEXAMETHASONE SODIUM PHOSPHATE 10 MG/ML
INJECTION, SOLUTION INTRAMUSCULAR; INTRAVENOUS AS NEEDED
Status: DISCONTINUED | OUTPATIENT
Start: 2024-12-20 | End: 2024-12-20

## 2024-12-20 RX ORDER — ASPIRIN 81 MG/1
81 TABLET ORAL 2 TIMES DAILY
Qty: 28 TABLET | Refills: 0 | Status: SHIPPED | OUTPATIENT
Start: 2024-12-20 | End: 2025-01-03

## 2024-12-20 RX ORDER — ONDANSETRON 2 MG/ML
INJECTION INTRAMUSCULAR; INTRAVENOUS AS NEEDED
Status: DISCONTINUED | OUTPATIENT
Start: 2024-12-20 | End: 2024-12-20

## 2024-12-20 RX ORDER — SODIUM CHLORIDE, SODIUM LACTATE, POTASSIUM CHLORIDE, CALCIUM CHLORIDE 600; 310; 30; 20 MG/100ML; MG/100ML; MG/100ML; MG/100ML
75 INJECTION, SOLUTION INTRAVENOUS CONTINUOUS
Status: DISCONTINUED | OUTPATIENT
Start: 2024-12-20 | End: 2024-12-20 | Stop reason: HOSPADM

## 2024-12-20 RX ORDER — CHLORHEXIDINE GLUCONATE ORAL RINSE 1.2 MG/ML
15 SOLUTION DENTAL ONCE
Status: COMPLETED | OUTPATIENT
Start: 2024-12-20 | End: 2024-12-20

## 2024-12-20 RX ORDER — OXYCODONE HYDROCHLORIDE 5 MG/1
5 TABLET ORAL EVERY 4 HOURS PRN
Refills: 0 | Status: DISCONTINUED | OUTPATIENT
Start: 2024-12-20 | End: 2024-12-20 | Stop reason: HOSPADM

## 2024-12-20 RX ORDER — CEFAZOLIN SODIUM 2 G/50ML
2000 SOLUTION INTRAVENOUS ONCE
Status: COMPLETED | OUTPATIENT
Start: 2024-12-20 | End: 2024-12-20

## 2024-12-20 RX ORDER — SENNOSIDES 8.6 MG
650 CAPSULE ORAL EVERY 6 HOURS PRN
Qty: 90 TABLET | Refills: 0 | Status: SHIPPED | OUTPATIENT
Start: 2024-12-20

## 2024-12-20 RX ORDER — TRANEXAMIC ACID 10 MG/ML
1000 INJECTION, SOLUTION INTRAVENOUS ONCE
Status: COMPLETED | OUTPATIENT
Start: 2024-12-20 | End: 2024-12-20

## 2024-12-20 RX ORDER — FENTANYL CITRATE/PF 50 MCG/ML
25 SYRINGE (ML) INJECTION
Status: DISCONTINUED | OUTPATIENT
Start: 2024-12-20 | End: 2024-12-20 | Stop reason: HOSPADM

## 2024-12-20 RX ORDER — MIDAZOLAM HYDROCHLORIDE 2 MG/2ML
INJECTION, SOLUTION INTRAMUSCULAR; INTRAVENOUS AS NEEDED
Status: DISCONTINUED | OUTPATIENT
Start: 2024-12-20 | End: 2024-12-20

## 2024-12-20 RX ORDER — OXYCODONE HYDROCHLORIDE 10 MG/1
10 TABLET ORAL EVERY 4 HOURS PRN
Refills: 0 | Status: DISCONTINUED | OUTPATIENT
Start: 2024-12-20 | End: 2024-12-20 | Stop reason: HOSPADM

## 2024-12-20 RX ORDER — OXYCODONE HYDROCHLORIDE 5 MG/1
5 TABLET ORAL EVERY 4 HOURS PRN
Qty: 10 TABLET | Refills: 0 | Status: SHIPPED | OUTPATIENT
Start: 2024-12-20 | End: 2024-12-30

## 2024-12-20 RX ORDER — SODIUM CHLORIDE, SODIUM LACTATE, POTASSIUM CHLORIDE, CALCIUM CHLORIDE 600; 310; 30; 20 MG/100ML; MG/100ML; MG/100ML; MG/100ML
125 INJECTION, SOLUTION INTRAVENOUS CONTINUOUS
Status: DISCONTINUED | OUTPATIENT
Start: 2024-12-20 | End: 2024-12-20 | Stop reason: HOSPADM

## 2024-12-20 RX ORDER — ONDANSETRON 4 MG/1
4 TABLET, ORALLY DISINTEGRATING ORAL EVERY 8 HOURS PRN
Qty: 15 TABLET | Refills: 0 | Status: SHIPPED | OUTPATIENT
Start: 2024-12-20

## 2024-12-20 RX ORDER — ONDANSETRON 2 MG/ML
4 INJECTION INTRAMUSCULAR; INTRAVENOUS ONCE AS NEEDED
Status: DISCONTINUED | OUTPATIENT
Start: 2024-12-20 | End: 2024-12-20 | Stop reason: HOSPADM

## 2024-12-20 RX ORDER — FENTANYL CITRATE 50 UG/ML
INJECTION, SOLUTION INTRAMUSCULAR; INTRAVENOUS AS NEEDED
Status: DISCONTINUED | OUTPATIENT
Start: 2024-12-20 | End: 2024-12-20

## 2024-12-20 RX ADMIN — ONDANSETRON 4 MG: 2 INJECTION INTRAMUSCULAR; INTRAVENOUS at 13:04

## 2024-12-20 RX ADMIN — FENTANYL CITRATE 25 MCG: 50 INJECTION INTRAMUSCULAR; INTRAVENOUS at 12:59

## 2024-12-20 RX ADMIN — SODIUM CHLORIDE, SODIUM LACTATE, POTASSIUM CHLORIDE, AND CALCIUM CHLORIDE: .6; .31; .03; .02 INJECTION, SOLUTION INTRAVENOUS at 12:37

## 2024-12-20 RX ADMIN — CHLORHEXIDINE GLUCONATE 15 ML: 1.2 RINSE ORAL at 11:23

## 2024-12-20 RX ADMIN — CEFAZOLIN SODIUM 2000 MG: 2 SOLUTION INTRAVENOUS at 12:44

## 2024-12-20 RX ADMIN — MIDAZOLAM 2 MG: 1 INJECTION INTRAMUSCULAR; INTRAVENOUS at 12:37

## 2024-12-20 RX ADMIN — FENTANYL CITRATE 25 MCG: 50 INJECTION INTRAMUSCULAR; INTRAVENOUS at 13:05

## 2024-12-20 RX ADMIN — TRANEXAMIC ACID 1000 MG: 10 INJECTION, SOLUTION INTRAVENOUS at 12:50

## 2024-12-20 RX ADMIN — FENTANYL CITRATE 25 MCG: 50 INJECTION INTRAMUSCULAR; INTRAVENOUS at 12:42

## 2024-12-20 RX ADMIN — SODIUM CHLORIDE, SODIUM LACTATE, POTASSIUM CHLORIDE, AND CALCIUM CHLORIDE 125 ML/HR: .6; .31; .03; .02 INJECTION, SOLUTION INTRAVENOUS at 11:26

## 2024-12-20 RX ADMIN — FENTANYL CITRATE 25 MCG: 50 INJECTION INTRAMUSCULAR; INTRAVENOUS at 12:55

## 2024-12-20 RX ADMIN — LIDOCAINE HYDROCHLORIDE 35 MG: 10 INJECTION, SOLUTION EPIDURAL; INFILTRATION; INTRACAUDAL; PERINEURAL at 12:42

## 2024-12-20 RX ADMIN — PROPOFOL 160 MG: 10 INJECTION, EMULSION INTRAVENOUS at 12:42

## 2024-12-20 RX ADMIN — DEXAMETHASONE SODIUM PHOSPHATE 10 MG: 10 INJECTION, SOLUTION INTRAMUSCULAR; INTRAVENOUS at 12:56

## 2024-12-20 NOTE — ANESTHESIA POSTPROCEDURE EVALUATION
Post-Op Assessment Note    CV Status:  Stable    Pain management: adequate       Mental Status:  Sleepy   Hydration Status:  Euvolemic   PONV Controlled:  Controlled   Airway Patency:  Patent     Post Op Vitals Reviewed: Yes    No anethesia notable event occurred.    Staff: CRNA           Last Filed PACU Vitals:  Vitals Value Taken Time   Temp     Pulse 68 12/20/24 1312   /78    Resp 15 12/20/24 1312   SpO2 100 % 12/20/24 1312   Vitals shown include unfiled device data.    Modified Shailesh:  No data recorded

## 2024-12-20 NOTE — OP NOTE
OPERATIVE REPORT    PATIENT NAME: Lilian Hernandez   :  1975  MRN: [id number]  Pt Location: [facility] OR ROOM 05    SURGERY DATE: 2024    SURGEON(S) and ROLE:  Primary: Casper Gutiérrez MD  Assisting: Dennys Irwin PA-C; Donna Cifuentes MD    NOTE:  I was present for the entire procedure and performed all essential portions of the surgery.      PREOPERATIVE DIAGNOSES:  Right Knee  Medial Meniscus Root Tear    POSTOPERATIVE DIAGNOSES:  Right Knee  Intact Medial Meniscus Root  Medial Suprapatellar Plica    PROCEDURES:  Right Knee Arthroscopy with:  Limited Synovectomy (Plica Resection)      ANESTHESIA TYPE:  General LMA and Intra-articular block    ANESTHESIA STAFF:   Anesthesiologist: Zoran Wynn MD  CRNA: Reece Bueno CRNA    ESTIMATED BLOOD LOSS:  5 mL    TOURNIQUET TIME:  Not used    PERIOPERATIVE ANTIBIOTICS:  cefazolin, 2 grams    IMPLANTS:  none    * No implants in log *    SPECIMENS:  * No specimens in log *    DRAINS:  None      OPERATIVE INDICATIONS:  The patient is a 49 y.o. female with right knee pain and MRI findings concerning for medial meniscus root tear.  Surgical treatment was indicated due to persistent symptoms despite non-surgical treatment.  After a thorough discussion of the potential risks, benefits, and alternative treatments, the patient agreed to proceed with surgery.  The patient understands that the risks of surgery include, but are not limited to: failure of repair, infection, neurovascular injury, wound healing complications, venous thromboembolism, persistent pain, stiffness, instability, recurrence of symptoms, potential need for additional surgeries, and loss of limb or life.  Oral and written consent for surgery was obtained from the patient preoperatively.      EXAM UNDER ANESTHESIA:  Neutral alignment  ROM:  0-135 degrees  Ligaments stable to varus stress / valgus stress / Lachman / posterior drawer; negative pivot-shift  Patella tracking normal   without crepitus.      PROCEDURE AND TECHNIQUE:  On the day of surgery, the patient was identified in the preoperative holding area.  The operative site was marked by the surgeon.  The patient was taken into the operating room.  A time-out was conducted to confirm the patient's identity, the operative site, and the proposed procedure.  The patient was anesthetized, and perioperative antibiotics were administered.  The patient was positioned supine on the OR table.  All bony prominences were padded.  A tourniquet was not used.  The operative site was prepped and draped using standard sterile technique.    An anterolateral portal was established, and the arthroscope was inserted into the knee joint.  An anteromedial portal was established under direct visualization, and diagnostic arthroscopy was performed.  The joint demonstrated no synovitis or loose bodies.    In the patellofemoral compartment, the trochlea demonstrated pristine articular cartilage.  The patella demonstrated pristine articular cartilage. The patella tracking was normal.  .    In the medial compartment, the medial femoral condyle demonstrated pristine articular cartilage.  The medial tibial plateau demonstrated pristine articular cartilage.  The medial meniscus was intact.  Specifically, the posterior root tear that was visualized on preoperative MRI was not present.  The posterior root was intact.    In the lateral compartment, the lateral femoral condyle demonstrated pristine articular cartilage.  The lateral tibial plateau demonstrated pristine articular cartilage.  The lateral meniscus was intact.  .    In the intercondylar notch, the PCL was intact.  The ACL was intact.    There was a medial suprapatellar plica present.  This was resected with an arthroscopic shaver.    At the conclusion of the procedure, the instruments were withdrawn.  The portals and incisions were closed with absorbable sutures and steri-strips.  A sterile dressing was  applied.  The surgical drapes were removed.  A soft bandage was applied to the operative knee.  The patient was awakened from anesthesia and transported to the PACU in stable condition.      COMPLICATIONS:  None    PATIENT DISPOSITION:  PACU       SIGNATURE:  Casper Gutiérrez MD  DATE:  December 20, 2024  TIME:  1:50 PM

## 2024-12-20 NOTE — INTERVAL H&P NOTE
H&P reviewed. After examining the patient I find no changes in the patients condition since the H&P had been written.    Vitals:    12/20/24 1054   BP: 117/83   Pulse: 68   Resp: 16   Temp: 98.3 °F (36.8 °C)   SpO2: 99%

## 2024-12-20 NOTE — ANESTHESIA PREPROCEDURE EVALUATION
Procedure:  Arthroscopic REPAIR MENISCUS root (Right: Knee)    Relevant Problems   CARDIO   (+) Headache, variant migraine      ENDO   (+) Hypothyroidism      GI/HEPATIC   (+) Gastroesophageal reflux disease      NEURO/PSYCH   (+) Headache, variant migraine        Physical Exam    Airway    Mallampati score: I  TM Distance: >3 FB  Neck ROM: full     Dental   No notable dental hx     Cardiovascular      Pulmonary      Other Findings  post-pubertal.      Anesthesia Plan  ASA Score- 2     Anesthesia Type- general with ASA Monitors.         Additional Monitors:     Airway Plan: LMA.           Plan Factors-Exercise tolerance (METS): >4 METS.    Chart reviewed.   Existing labs reviewed. Patient summary reviewed.                  Induction- intravenous.    Postoperative Plan- Plan for postoperative opioid use. Planned trial extubation        Informed Consent- Anesthetic plan and risks discussed with patient.  I personally reviewed this patient with the CRNA. Discussed and agreed on the Anesthesia Plan with the CRNA..

## 2024-12-20 NOTE — DISCHARGE INSTR - AVS FIRST PAGE
POSTOPERATIVE INSTRUCTIONS following KNEE SURGERY    MEDICATIONS:  Resume all home medications unless otherwise instructed by your surgeon.  Pain Medication:  Oxycodone 5 mg, 1-2 tablets every 4 hours as needed  If you were given a regional anesthetic (nerve block), please begin taking the pain medication as soon as you get home, even if you have minimal or no pain.  DO NOT WAIT FOR THE NERVE BLOCK TO WEAR OFF.  Possible side effects include nausea, constipation, and urinary retention.  If you experience these side effects, please call our office for assistance.  Pain med refills are authorized only during office hours (8am-4pm, Mon-Fri).  Anti-Inflammatory:  Tylenol 650 mg, 1 tablet every 6 hours and Naproxen 500 mg, 1 tablet every 12 hours  Take with food.  Stop if you experience nausea, reflux, or stomach pain.  Nausea Medication:  Zofran ODT 4 mg, 1 tablet every 6 hours as needed  Fill prescription ONLY if you expericnce severe nausea.  Blood Clot Prevention:  Aspirin 81 mg, 1 tablet twice daily for 2 weeks  Pump your foot up and down 20 times per hour while you are less mobile.    WOUND CARE:  Keep the dressing clean and dry.  Light drainage may occur the first 2 days postop.  You may remove the dressings and get the incision wet in the shower 72 hours after surgery.  DO NOT remove steri-strips or sutures.  DO NOT immerse the incision under water.  Carefully pat the incision dry.  If there is wound drainage, re-apply a fresh dry gauze dressing.  Please call our office ([phone]) if you experience either of the following:  Sudden increase in swelling, redness, or warmth at the surgical site  Excessive incisional drainage that persists beyond the 3rd day after surgery  Oral temperature greater than 101 degrees, not relieved with Tylenol  Shortness of breath, chest pain, nausea, or any other concerning symptoms    SWELLING CONTROL:  Cold Therapy:  The cold therapy device may be used either continuously or only  "as needed, according to your preference.  Do not let the pad directly touch your skin.  Alternatively, apply ice (20 min on, 20 min off) as often as you feel is necessary.  Elevation:  Elevate the entire leg above heart level.  Place pillows under your ankle to keep your knee straight.  Compression:  Apply ACE wraps or a thigh-length compression stocking as needed.    RANGE OF MOTION:  You are allowed FULL RANGE OF MOTION as tolerated.    IMMOBILIZATION:  None.  You are allowed full range of motion as tolerated.    ACTIVITY:   BEAR FULL WEIGHT AS TOLERATED on the operative leg.  Use crutches to assist only as needed.  Using Crutches on Stairs:  Going up, lead with your \"good\" (nonoperative) leg.  Going down, lead with your \"bad\" (operative) leg.  Use a hand rail when available.  Knee Extension:  Place a rolled towel or pillow under your ankle for 20-30 minutes 3-5 times per day.  This will help to maintain full knee extension.  Quad Sets:  Sit or lie with your knee straight.  Tighten your quadriceps (front thigh) muscle.  Hold for 3 seconds, then relax.  Repeat 20 times per hour while awake.    PHYSICAL THERAPY:  You will be given a physical therapy prescription when you are seen in the office for your postoperative appointment.    FOLLOW-UP APPOINTMENT:  4-5 days after surgery with:    Dr. Casper Gutiérrez MD  [facility]  [address] [place] [address], [address], [place] [address]  [phone]    "

## 2024-12-20 NOTE — ANESTHESIA POSTPROCEDURE EVALUATION
Post-Op Assessment Note    CV Status:  Stable    Pain management: adequate       Mental Status:  Alert and awake   Hydration Status:  Euvolemic   PONV Controlled:  Controlled   Airway Patency:  Patent     Post Op Vitals Reviewed: Yes    No anethesia notable event occurred.    Staff: Anesthesiologist           Last Filed PACU Vitals:  Vitals Value Taken Time   Temp 97.6 °F (36.4 °C) 12/20/24 1330   Pulse 75 12/20/24 1330   /69 12/20/24 1330   Resp 14 12/20/24 1330   SpO2 99 % 12/20/24 1330       Modified Shailesh:  Activity: 2 (12/20/2024  1:30 PM)  Respiration: 2 (12/20/2024  1:30 PM)  Circulation: 2 (12/20/2024  1:30 PM)  Consciousness: 2 (12/20/2024  1:30 PM)  Oxygen Saturation: 2 (12/20/2024  1:30 PM)  Modified Shailesh Score: 10 (12/20/2024  1:30 PM)

## 2024-12-23 ENCOUNTER — OFFICE VISIT (OUTPATIENT)
Dept: OBGYN CLINIC | Facility: MEDICAL CENTER | Age: 49
End: 2024-12-23

## 2024-12-23 VITALS — BODY MASS INDEX: 18.69 KG/M2 | HEIGHT: 61 IN | WEIGHT: 99 LBS

## 2024-12-23 DIAGNOSIS — M23.91 INTERNAL DERANGEMENT OF RIGHT KNEE: Primary | ICD-10-CM

## 2024-12-23 PROCEDURE — 99024 POSTOP FOLLOW-UP VISIT: CPT | Performed by: PHYSICIAN ASSISTANT

## 2024-12-23 NOTE — PROGRESS NOTES
"Orthopaedic Surgery - Office Note  Lilian Hernandez (49 y.o. female)   : 1975   MRN: [id number]  Encounter Date: 2024    Assessment / Plan    Status post right knee arthroscopy with limited synovectomy on 2024  Continue with ice and analgesics/NSAIDs as needed for pain.  I did review the patient's arthroscopic photos with her at today's visit and provided her a copy.  Patient can progress activity as tolerated.  I placed a PT prescription in the system to start outpatient physical therapy with progression to home therapy program as tolerated.  Follow-up:  Return in about 6 weeks (around 2/3/2025) for follow up with Dr. Gutiérrez.      Chief Complaint / Date of Onset  Right knee pain and catching   Injury Mechanism / Date  She was trail running in October and squatted down to pick something up and felt a sharp pain when she stood up  Surgery / Date  Status post right knee arthroscopy with limited synovectomy on 2024    History of Present Illness   Lilian Hernandez is a 49 y.o. female who presents for follow-up status post right knee arthroscopy with limited synovectomy on 2024.  Overall patient is doing well.  She does not have much pain just some soreness in her knee.  She denies any issues with the incisions up to this point.  She denies any distal numbness or tingling.    Treatment Summary  Medications / Modalities  Tylenol prn   Bracing / Immobilization  K tape with no relief   Physical Therapy  HEP learned in PT for hip, thigh and core strengthening   Injections  None  Prior Surgeries  None  Other Treatments  None     Employment / Current Status  Photography, mostly sedentary      Sport / Organization / Current Status  Avid runner      Review of Systems  Pertinent items are noted in HPI.  All other systems were reviewed and are negative.      Physical Exam  Ht 5' 1\" (1.549 m)   Wt 44.9 kg (99 lb)   BMI 18.71 kg/m²   Cons: Appears well.  No apparent distress.  Psych: Alert. Oriented " x3.  Mood and affect normal.  Eyes: PERRLA, EOMI  Resp: Normal effort.  No audible wheezing or stridor.  CV: Palpable pulse.  No discernable arrhythmia.  No LE edema.  Lymph:  No palpable cervical, axillary, or inguinal lymphadenopathy.  Skin: Warm.  No palpable masses.  No visible lesions.  Neuro: Normal muscle tone.  Normal and symmetric DTR's.     Right Knee Exam  Alignment:  Normal knee alignment.  Inspection:   Mild swelling,incisions healing well Steri-Strips were replaced.  No erythema or drainage.  Palpation:   Mild tenderness at nemesio-incisional areas.  ROM:  Knee Extension 0. Knee Flexion 110.  Strength:  Able to actively extend knee against gravity.  Stability:  Not tested.  Tests:  No pertinent positive or negative tests.  Patella:  Not tested.  Neurovascular:  Sensation intact in DP/SP/Limon/Sa/T nerve distributions. Sensation intact in all digital nerve distributions.  Toes warm and perfused.  Gait:  Steady.       Studies Reviewed  No studies to review      Procedures  No procedures today.    Medical, Surgical, Family, and Social History  The patient's medical history, family history, and social history, were reviewed and updated as appropriate.    Past Medical History:   Diagnosis Date    Abnormal Pap smear of cervix     Allergic     Cluster headache     Colon polyp     Disease of thyroid gland     GERD (gastroesophageal reflux disease)     Headache(784.0)     Migraine    Headache, tension-type     HL (hearing loss)     Sudden hearing loss hearing mostly came back    Hypothyroidism     Migraine        Past Surgical History:   Procedure Laterality Date    ANKLE SURGERY Left 03/2020    ANTERIOR CRUCIATE LIGAMENT REPAIR Right 2007    COLONOSCOPY      FL INJECTION LEFT HIP (ARTHROGRAM)  08/23/2021    HIP SURGERY Left 2014    nerve entrapment release    NC ARTHROSCOPY KNEE W/MENISCUS RPR MEDIAL/LATERAL Right 12/20/2024    Procedure: Diagnostic right knee arthroscopy, limited synovectomy;  Surgeon: Casper Gonzalez  MD Brock;  Location: [facility];  Service: Orthopedics    TENDON REPAIR  04/06    ACL Repair    WRIST SURGERY Right 03/2009    Dr Cifuentes    WRIST SURGERY Right 02/07/2020       Family History   Problem Relation Age of Onset    Hypothyroidism Mother     Migraines Mother     Thyroid disease Mother     Transient ischemic attack Mother     Hyperlipidemia Father         Pure    Anxiety disorder Sister     Hyperlipidemia Sister     Ovarian cancer Maternal Grandmother 50        Age 76    Cancer Maternal Grandmother         Ovarian at 76    Lymphoma Maternal Grandfather 82    Stomach cancer Paternal Grandmother 90    Cancer Paternal Grandmother         Pancreatic at 92    No Known Problems Paternal Grandfather     Prostate cancer Maternal Uncle     Prostate cancer Paternal Uncle     Breast cancer Other        Social History     Occupational History    Not on file   Tobacco Use    Smoking status: Never     Passive exposure: Past    Smokeless tobacco: Never   Vaping Use    Vaping status: Never Used   Substance and Sexual Activity    Alcohol use: Not Currently     Comment: Social     Drug use: Yes     Comment: CBD gummies    Sexual activity: Not Currently     Partners: Male     Birth control/protection: Other     Comment: Birth Control loloestrin       Allergies   Allergen Reactions    Bee Venom Anaphylaxis    Codeine      Other reaction(s): Palpitations  Reaction Date: 03Aug2011;     Medical Tape Dermatitis    Molds & Smuts     Wound Dressing Adhesive Rash     Redness           Current Outpatient Medications:     aspirin (ECOTRIN LOW STRENGTH) 81 mg EC tablet, Take 1 tablet (81 mg total) by mouth 2 (two) times a day for 14 days, Disp: 28 tablet, Rfl: 0    EPINEPHrine (EPIPEN) 0.3 mg/0.3 mL SOAJ, INJECT 0.3 ML (0.3 MG TOTAL) INTO A MUSCLE AS NEEDED FOR ANAPHYLAXIS, Disp: 2 each, Rfl: 1    famotidine (Pepcid) 40 MG tablet, Take 40 mg by mouth daily, Disp: , Rfl:     fluticasone (FLONASE) 50 mcg/act nasal spray, 1 spray into  each nostril daily, Disp: , Rfl:     levothyroxine 75 mcg tablet, TAKE 1 TABLET BY MOUTH EVERY DAY, Disp: 90 tablet, Rfl: 1    Lo Loestrin Fe 1 MG-10 MCG / 10 MCG TABS, Take 1 tablet by mouth daily, Disp: 84 tablet, Rfl: 4    multivitamin (THERAGRAN) TABS, Take 1 tablet by mouth daily, Disp: , Rfl:     omeprazole (PriLOSEC) 20 mg delayed release capsule, Take 1 capsule (20 mg total) by mouth daily before breakfast, Disp: 90 capsule, Rfl: 3    rimegepant sulfate (NURTEC) 75 mg TBDP, Take one NURTEC 75 mg at onset under tongue. Limit 1 in 24 hours, Disp: 8 tablet, Rfl: 11    acetaminophen (TYLENOL) 650 mg CR tablet, Take 1 tablet (650 mg total) by mouth every 6 (six) hours as needed for mild pain (Patient not taking: Reported on 12/23/2024), Disp: 90 tablet, Rfl: 0    naproxen (NAPROSYN) 500 mg tablet, Take 1 tablet (500 mg total) by mouth 2 (two) times a day with meals (Patient not taking: Reported on 12/23/2024), Disp: 60 tablet, Rfl: 0    ondansetron (ZOFRAN-ODT) 4 mg disintegrating tablet, Take 1 tablet (4 mg total) by mouth every 8 (eight) hours as needed for nausea or vomiting (Patient not taking: Reported on 12/23/2024), Disp: 15 tablet, Rfl: 0    oxyCODONE (ROXICODONE) 5 immediate release tablet, Take 1 tablet (5 mg total) by mouth every 4 (four) hours as needed for moderate pain for up to 10 days Max Daily Amount: 30 mg (Patient not taking: Reported on 12/23/2024), Disp: 10 tablet, Rfl: 0      Dennys Irwin PA-C    Scribe Attestation      I,:   am acting as a scribe while in the presence of the attending physician.:       I,:   personally performed the services described in this documentation    as scribed in my presence.:

## 2024-12-31 ENCOUNTER — TELEMEDICINE (OUTPATIENT)
Dept: GASTROENTEROLOGY | Facility: MEDICAL CENTER | Age: 49
End: 2024-12-31
Payer: COMMERCIAL

## 2024-12-31 DIAGNOSIS — Z86.0100 PERSONAL HISTORY OF COLON POLYPS, UNSPECIFIED: ICD-10-CM

## 2024-12-31 DIAGNOSIS — K86.2 PANCREATIC CYST: ICD-10-CM

## 2024-12-31 DIAGNOSIS — K25.3 ACUTE GASTRIC ULCER WITHOUT HEMORRHAGE OR PERFORATION: Primary | ICD-10-CM

## 2024-12-31 PROCEDURE — 99213 OFFICE O/P EST LOW 20 MIN: CPT | Performed by: NURSE PRACTITIONER

## 2024-12-31 NOTE — PROGRESS NOTES
Virtual Regular Visit  Name: Lilian Hernandez      : 1975      MRN: [id number]  Encounter Provider: GRABIEL Sandoval  Encounter Date: 2024   Encounter department: [facility] [place]      Verification of patient location:  Patient is located at Home in the following state in which I hold an active license [place] :  Assessment & Plan  Acute gastric ulcer without hemorrhage or perforation  History of gastric ulcers.  Recent EGD 10/24 noted healed gastric ulcers.  Colonoscopy at the same time noted 3 polyps, 2 tubular adenomas and 1 hyperplastic polyp.  Biopsies were negative for H. pylori.  She is feeling well overall.  Currently on omeprazole 20 mg daily.  Recently had knee surgery and was told to take 2 baby aspirin a day.  Reports she did get some abdominal discomfort and increased her omeprazole to 40 mg daily.  She will do this through next week and then reduce to 20 mg daily.    -Reduce omeprazole to 20 mg daily  -Follow-up in 6 months or sooner if needed       Personal history of colon polyps, unspecified  Recent colonoscopy 10/24 noted 3 polyps-2 tubular adenomas and 1 hyperplastic polyp.  Repeat colonoscopy recommended in 5 years.  BMs are brown and formed.  Denies any melena hematochezia.       Pancreatic cyst  Recent CT abdomen pelvis noted an 8 mm cyst in the body of the pancreas.  Repeat imaging in 1 year via MRI or CT.  I did review results with patient.  Denies any family history of any pancreatic cancer.  She is agreeable to an MRI/MRCP .      Orders:  •  MRI abdomen w wo contrast and mrcp; Future        Encounter provider GRABIEL Sandoval    The patient was identified by name and date of birth. Lilian Hernandez was informed that this is a telemedicine visit and that the visit is being conducted through the Epic Embedded platform. She agrees to proceed..  My office door was closed. No one else was in the room.  She acknowledged consent  and understanding of privacy and security of the video platform. The patient has agreed to participate and understands they can discontinue the visit at any time.    Patient is aware this is a billable service.     History of Present Illness     HPI  49-year-old female with history of gastric ulcer, hiatal hernia, personal history of colon polyps and lower abdominal pain.    History of gastric ulcers.  Recent EGD 10/24 noted healed gastric ulcers.  Colonoscopy at the same time noted 3 polyps, 2 tubular adenomas and 1 hyperplastic polyp.  Biopsies were negative for H. pylori.  She is feeling well overall.  Currently on omeprazole 20 mg daily.  Recently had knee surgery and was told to take 2 baby aspirin a day.  Reports she did get some abdominal discomfort and increased her omeprazole to 40 mg daily.  She will do this through next week and then reduce to 20 mg daily.  Denies any nausea, vomiting or dysphagia.  No heartburn or reflux.  BMs are brown and formed daily.  Denies any melena hematochezia.    CT abdomen pelvis 9/25-No acute findings in the abdomen or pelvis.    8 mm pancreatic body cyst. For simple cyst(s) less than 1.5 cm, recommend yearly follow-up 5 times, then every 2 years for 2 times. If cyst(s) stable after 9 years, no further follow-ups. Recommend next follow-up in 1 year. Preferred imaging modality:   abdomen MRI and MRCP with and without IV contrast, or triple phase abdomen CT with IV contrast, or abdomen MRI and MRCP without IV contrast.     Prior EGD/colonoscopy     Colonoscopy 10/24-scarred mucosa in the cecum at the site of prior polypectomy.  No evidence of residual polyp at the site.  2 subcentimeter polyps were removed.  Large hemorrhoids otherwise normal colonic mucosa.  Repeat colonoscopy 5 years due to personal history of colon polyps.  Biopsy revealed tubular adenomas and 1 hyperplastic polyp.    EGD 10/24-small hiatal hernia.  Stomach normal.  Previous visualized gastric ulcers have  healed.  The duodenum appeared normal.  Biopsies noted mild chronic inactive gastritis.  Negative for intestinal metaplasia or dysplasia.  Biopsies for H. pylori were negative.     EGD 2/24-Small hiatal hernia, 2 small, ulcers in the antrum.  Biopsies negative for H. pylori.     EGD 2020-mild hiatal hernia and Candida esophagitis which was treated with fluconazole.     Colonoscopy 2020-20 mm flat cecal polyp just behind the IC valve.  It was removed piecemeal and showed sessile serrated adenoma.  Repeat colonoscopy was performed 10/21 showing scarring at the polypectomy site and pathology was normal.  Was recommended to repeat colonoscopy in 3 years.  Review of Systems    10 point review of systems negative other than per HPI    Objective   There were no vitals taken for this visit.    Physical Exam    REVIEW OF SYSTEMS:    CONSTITUTIONAL: Denies any fever, chills, rigors, and weight loss.  HEENT: No earache or tinnitus. Denies hearing loss or visual disturbances.  CARDIOVASCULAR: No chest pain or palpitations.   RESPIRATORY: Denies any cough, hemoptysis, shortness of breath or dyspnea on exertion.  GASTROINTESTINAL: As noted in the History of Present Illness.   GENITOURINARY: No problems with urination. Denies any hematuria or dysuria.  NEUROLOGIC: No dizziness or vertigo, denies headaches.   MUSCULOSKELETAL: Denies any muscle or joint pain.   SKIN: Denies skin rashes or itching.   ENDOCRINE: Denies excessive thirst. Denies intolerance to heat or cold.  PSYCHOSOCIAL: Denies depression or anxiety. Denies any recent memory loss.       PHYSICAL EXAMINATION:  Appearance and vitals taken from home devices    General Appearance:   Alert, cooperative, no distress   HEENT:  Normocephalic, atraumatic, anicteric. Neck supple, symmetrical, trachea midline.   Lungs:   Equal chest rise and unlabored breathing, normal effort, no coughing.   Cardiovascular:   No visualized JVD.   Abdomen:   No abdominal distension.   Skin:   No  jaundice, rashes, or lesions.    Musculoskeletal:   Normal range of motion visualized.   Psych:  Normal affect and normal insight.   Neuro:  Alert and appropriate.        Visit Time  Total Visit Duration: 7 minutes   normal...

## 2025-02-05 ENCOUNTER — OFFICE VISIT (OUTPATIENT)
Dept: OBGYN CLINIC | Facility: OTHER | Age: 50
End: 2025-02-05

## 2025-02-05 VITALS — BODY MASS INDEX: 19.3 KG/M2 | WEIGHT: 102.2 LBS | HEIGHT: 61 IN

## 2025-02-05 DIAGNOSIS — S83.241A ACUTE MEDIAL MENISCUS TEAR OF RIGHT KNEE, INITIAL ENCOUNTER: Primary | ICD-10-CM

## 2025-02-05 PROCEDURE — 99024 POSTOP FOLLOW-UP VISIT: CPT | Performed by: ORTHOPAEDIC SURGERY

## 2025-02-05 NOTE — PROGRESS NOTES
"Orthopaedic Surgery - Office Note  Lilian Hernandez (49 y.o. female)   : 1975   MRN: [id number]  Encounter Date: 2025    Assessment / Plan    Postop from right knee arthroscopy and partial synovectomy DOS 2024    Continue PT  Continue WBAT, ROMAT  Tylenol/motrin prn for pain control  Follow-up:  Follow up in 6-8 weeks for recheck of motion      Chief Complaint / Date of Onset  postop  Injury Mechanism / Date    Surgery / Date  2024    History of Present Illness   Lilian Hernandez is a 49 y.o. female who presents for right knee postop. She is doing well not taking any pain control at this point, still has some stiffness but improving with physical therapy. She states that she feels improved comp;ared to prior to surgery.    Treatment Summary  Medications / Modalities  none  Bracing / Immobilization  none  Physical Therapy  Currently doing  Injections    Prior Surgeries    Other Treatments        Review of Systems  Pertinent items are noted in HPI.  All other systems were reviewed and are negative.      Physical Exam  Ht 5' 1\" (1.549 m)   Wt 46.4 kg (102 lb 3.2 oz)   BMI 19.31 kg/m²   Cons: Appears well.  No apparent distress.  Psych: Alert. Oriented x3.  Mood and affect normal.  Eyes: PERRLA, EOMI  Resp: Normal effort.  No audible wheezing or stridor.  CV: Palpable pulse.  No discernable arrhythmia.  No LE edema.  Lymph:  No palpable cervical, axillary, or inguinal lymphadenopathy.  Skin: Warm.  No palpable masses.  No visible lesions.  Neuro: Normal muscle tone.  Normal and symmetric DTR's.     Right Knee Exam  Alignment:  Normal knee alignment.  Inspection:  No swelling. No edema. No erythema. No ecchymosis.  Palpation:  No tenderness.  ROM:  Normal knee ROM.  Strength:  5/5 hip flexors and abductors. 5/5 quadriceps and hamstrings. Able to SLR without lag.  Stability:  No objective knee instability. Stable Varus / Valgus stress, Lachman, and Posterior drawer.  Tests:  No pertinent positive or " negative tests.  Patella:  Normal patellar mobility.  Neurovascular:  Sensation intact in DP/SP/Limon/Sa/T nerve distributions.  Toes warm and perfused.  Gait:  Normal.        Studies Reviewed  No studies to review      Medical, Surgical, Family, and Social History  The patient's medical history, family history, and social history, were reviewed and updated as appropriate.    Past Medical History:   Diagnosis Date   • Abnormal Pap smear of cervix    • Allergic    • Cluster headache    • Colon polyp    • Disease of thyroid gland    • GERD (gastroesophageal reflux disease)    • Headache(784.0)     Migraine   • Headache, tension-type    • HL (hearing loss)     Sudden hearing loss hearing mostly came back   • Hypothyroidism    • Migraine        Past Surgical History:   Procedure Laterality Date   • ANKLE SURGERY Left 03/2020   • ANTERIOR CRUCIATE LIGAMENT REPAIR Right 2007   • COLONOSCOPY     • FL INJECTION LEFT HIP (ARTHROGRAM)  08/23/2021   • HIP SURGERY Left 2014    nerve entrapment release   • [place] ARTHROSCOPY KNEE W/MENISCUS RPR MEDIAL/LATERAL Right 12/20/2024    Procedure: Diagnostic right knee arthroscopy, limited synovectomy;  Surgeon: Casper Gutiérrez MD;  Location:  MAIN OR;  Service: Orthopedics   • TENDON REPAIR  04/06    ACL Repair   • WRIST SURGERY Right 03/2009    Dr Cifuentes   • WRIST SURGERY Right 02/07/2020       Family History   Problem Relation Age of Onset   • Hypothyroidism Mother    • Migraines Mother    • Thyroid disease Mother    • Transient ischemic attack Mother    • Hyperlipidemia Father         Pure   • Anxiety disorder Sister    • Hyperlipidemia Sister    • Ovarian cancer Maternal Grandmother 50        Age 76   • Cancer Maternal Grandmother         Ovarian at 76   • Lymphoma Maternal Grandfather 82   • Stomach cancer Paternal Grandmother 90   • Cancer Paternal Grandmother         Pancreatic at 92   • No Known Problems Paternal Grandfather    • Prostate cancer Maternal Uncle    • Prostate  cancer Paternal Uncle    • Breast cancer Other        Social History     Occupational History   • Not on file   Tobacco Use   • Smoking status: Never     Passive exposure: Past   • Smokeless tobacco: Never   Vaping Use   • Vaping status: Never Used   Substance and Sexual Activity   • Alcohol use: Not Currently     Comment: Social    • Drug use: Yes     Comment: CBD gummies   • Sexual activity: Not Currently     Partners: Male     Birth control/protection: Other     Comment: Birth Control loloestrin       Allergies   Allergen Reactions   • Bee Venom Anaphylaxis   • Codeine      Other reaction(s): Palpitations  Reaction Date: 03Aug2011;    • Medical Tape Dermatitis   • Molds & Smuts    • Wound Dressing Adhesive Rash     Redness           Current Outpatient Medications:   •  EPINEPHrine (EPIPEN) 0.3 mg/0.3 mL SOAJ, INJECT 0.3 ML (0.3 MG TOTAL) INTO A MUSCLE AS NEEDED FOR ANAPHYLAXIS, Disp: 2 each, Rfl: 1  •  famotidine (Pepcid) 40 MG tablet, Take 40 mg by mouth daily, Disp: , Rfl:   •  fluticasone (FLONASE) 50 mcg/act nasal spray, 1 spray into each nostril daily, Disp: , Rfl:   •  levothyroxine 75 mcg tablet, TAKE 1 TABLET BY MOUTH EVERY DAY, Disp: 90 tablet, Rfl: 1  •  Lo Loestrin Fe 1 MG-10 MCG / 10 MCG TABS, Take 1 tablet by mouth daily, Disp: 84 tablet, Rfl: 4  •  multivitamin (THERAGRAN) TABS, Take 1 tablet by mouth daily, Disp: , Rfl:   •  omeprazole (PriLOSEC) 20 mg delayed release capsule, Take 1 capsule (20 mg total) by mouth daily before breakfast, Disp: 90 capsule, Rfl: 3  •  rimegepant sulfate (NURTEC) 75 mg TBDP, Take one NURTEC 75 mg at onset under tongue. Limit 1 in 24 hours, Disp: 8 tablet, Rfl: 11  •  acetaminophen (TYLENOL) 650 mg CR tablet, Take 1 tablet (650 mg total) by mouth every 6 (six) hours as needed for mild pain (Patient not taking: Reported on 12/23/2024), Disp: 90 tablet, Rfl: 0  •  aspirin (ECOTRIN LOW STRENGTH) 81 mg EC tablet, Take 1 tablet (81 mg total) by mouth 2 (two) times a day for  14 days (Patient not taking: Reported on 12/31/2024), Disp: 28 tablet, Rfl: 0  •  naproxen (NAPROSYN) 500 mg tablet, Take 1 tablet (500 mg total) by mouth 2 (two) times a day with meals (Patient not taking: Reported on 12/23/2024), Disp: 60 tablet, Rfl: 0  •  ondansetron (ZOFRAN-ODT) 4 mg disintegrating tablet, Take 1 tablet (4 mg total) by mouth every 8 (eight) hours as needed for nausea or vomiting (Patient not taking: Reported on 12/23/2024), Disp: 15 tablet, Rfl: 0      Waqas Mccain MD    Scribe Attestation    I,:   am acting as a scribe while in the presence of the attending physician.:       I,:   personally performed the services described in this documentation    as scribed in my presence.:

## 2025-03-25 ENCOUNTER — PATIENT MESSAGE (OUTPATIENT)
Dept: FAMILY MEDICINE CLINIC | Facility: CLINIC | Age: 50
End: 2025-03-25

## 2025-03-26 ENCOUNTER — OFFICE VISIT (OUTPATIENT)
Dept: OBGYN CLINIC | Facility: OTHER | Age: 50
End: 2025-03-26
Payer: COMMERCIAL

## 2025-03-26 VITALS — WEIGHT: 102 LBS | HEIGHT: 61 IN | BODY MASS INDEX: 19.26 KG/M2

## 2025-03-26 DIAGNOSIS — Z98.890 S/P RIGHT KNEE ARTHROSCOPY: Primary | ICD-10-CM

## 2025-03-26 PROCEDURE — 99213 OFFICE O/P EST LOW 20 MIN: CPT | Performed by: ORTHOPAEDIC SURGERY

## 2025-03-26 NOTE — PROGRESS NOTES
"Orthopaedic Surgery - Office Note  Lilian Hernandez (49 y.o. female)   : 1975   MRN: [id number]  Encounter Date: 3/26/2025    Assessment / Plan    Postop from right knee arthroscopy and partial synovectomy DOS 2024 overall doing well    Continue PT as needed  Increase activities to tolerance.   See back on as needed basis       Chief Complaint / Date of Onset  postop  Injury Mechanism / Date    Surgery / Date  2024    History of Present Illness   Lilian Hernandez is a 49 y.o. female who presents for right knee postop. She is doing well not taking any pain control at this point, still has some stiffness but improving with physical therapy. She states preoperative symptoms have resolved. She is up to trail running 3 miles. She is still attending PT once a week to help her get into running shape.     Treatment Summary  Medications / Modalities  none  Bracing / Immobilization  none  Physical Therapy  Currently doing  Injections    Prior Surgeries    Other Treatments        Review of Systems  Pertinent items are noted in HPI.  All other systems were reviewed and are negative.      Physical Exam  Ht 5' 1\" (1.549 m)   Wt 46.3 kg (102 lb)   BMI 19.27 kg/m²   Cons: Appears well.  No apparent distress.  Psych: Alert. Oriented x3.  Mood and affect normal.  Eyes: PERRLA, EOMI  Resp: Normal effort.  No audible wheezing or stridor.  CV: Palpable pulse.  No discernable arrhythmia.  No LE edema.  Lymph:  No palpable cervical, axillary, or inguinal lymphadenopathy.  Skin: Warm.  No palpable masses.  No visible lesions.  Neuro: Normal muscle tone.  Normal and symmetric DTR's.     Right Knee Exam  Alignment:  Normal knee alignment.  Inspection: well healed portal sites  Palpation:  No tenderness.  ROM:  Normal knee ROM.  Strength:  5/5 hip flexors and abductors. 5/5 quadriceps and hamstrings. Able to SLR without lag.  Stability:  No objective knee instability. Stable Varus / Valgus stress, Lachman, and Posterior " drawer.  Tests:  No pertinent positive or negative tests.  Patella:  Normal patellar mobility.  Neurovascular:  Sensation intact in DP/SP/Limon/Sa/T nerve distributions.  Toes warm and perfused.  Gait:  Normal.        Studies Reviewed  No studies to review      Medical, Surgical, Family, and Social History  The patient's medical history, family history, and social history, were reviewed and updated as appropriate.    Past Medical History:   Diagnosis Date    Abnormal Pap smear of cervix     Allergic     Cluster headache     Colon polyp     Disease of thyroid gland     GERD (gastroesophageal reflux disease)     Headache(784.0)     Migraine    Headache, tension-type     HL (hearing loss)     Sudden hearing loss hearing mostly came back    Hypothyroidism     Migraine        Past Surgical History:   Procedure Laterality Date    ANKLE SURGERY Left 03/2020    ANTERIOR CRUCIATE LIGAMENT REPAIR Right 2007    COLONOSCOPY      FL INJECTION LEFT HIP (ARTHROGRAM)  08/23/2021    HIP SURGERY Left 2014    nerve entrapment release    [place] ARTHROSCOPY KNEE W/MENISCUS RPR MEDIAL/LATERAL Right 12/20/2024    Procedure: Diagnostic right knee arthroscopy, limited synovectomy;  Surgeon: Casper Gutiérrez MD;  Location: [facility];  Service: Orthopedics    TENDON REPAIR  04/06    ACL Repair    WRIST SURGERY Right 03/2009    Dr Cifuentes    WRIST SURGERY Right 02/07/2020       Family History   Problem Relation Age of Onset    Hypothyroidism Mother     Migraines Mother     Thyroid disease Mother     Transient ischemic attack Mother     Hyperlipidemia Father         Pure    Anxiety disorder Sister     Hyperlipidemia Sister     Ovarian cancer Maternal Grandmother 50        Age 76    Cancer Maternal Grandmother         Ovarian at 76    Lymphoma Maternal Grandfather 82    Stomach cancer Paternal Grandmother 90    Cancer Paternal Grandmother         Pancreatic at 92    No Known Problems Paternal Grandfather     Prostate cancer Maternal Uncle      Prostate cancer Paternal Uncle     Breast cancer Other        Social History     Occupational History    Not on file   Tobacco Use    Smoking status: Never     Passive exposure: Past    Smokeless tobacco: Never   Vaping Use    Vaping status: Never Used   Substance and Sexual Activity    Alcohol use: Not Currently     Comment: Social     Drug use: Yes     Comment: CBD gummies    Sexual activity: Not Currently     Partners: Male     Birth control/protection: Other     Comment: Birth Control loloestrin       Allergies   Allergen Reactions    Bee Venom Anaphylaxis    Codeine      Other reaction(s): Palpitations  Reaction Date: 03Aug2011;     Medical Tape Dermatitis    Molds & Smuts     Wound Dressing Adhesive Rash     Redness           Current Outpatient Medications:     EPINEPHrine (EPIPEN) 0.3 mg/0.3 mL SOAJ, INJECT 0.3 ML (0.3 MG TOTAL) INTO A MUSCLE AS NEEDED FOR ANAPHYLAXIS, Disp: 2 each, Rfl: 1    famotidine (Pepcid) 40 MG tablet, Take 40 mg by mouth daily, Disp: , Rfl:     fluticasone (FLONASE) 50 mcg/act nasal spray, 1 spray into each nostril daily, Disp: , Rfl:     levothyroxine 75 mcg tablet, TAKE 1 TABLET BY MOUTH EVERY DAY, Disp: 90 tablet, Rfl: 1    Lo Loestrin Fe 1 MG-10 MCG / 10 MCG TABS, Take 1 tablet by mouth daily, Disp: 84 tablet, Rfl: 4    multivitamin (THERAGRAN) TABS, Take 1 tablet by mouth daily, Disp: , Rfl:     omeprazole (PriLOSEC) 20 mg delayed release capsule, Take 1 capsule (20 mg total) by mouth daily before breakfast, Disp: 90 capsule, Rfl: 3    rimegepant sulfate (NURTEC) 75 mg TBDP, Take one NURTEC 75 mg at onset under tongue. Limit 1 in 24 hours, Disp: 8 tablet, Rfl: 11    acetaminophen (TYLENOL) 650 mg CR tablet, Take 1 tablet (650 mg total) by mouth every 6 (six) hours as needed for mild pain (Patient not taking: Reported on 3/26/2025), Disp: 90 tablet, Rfl: 0    aspirin (ECOTRIN LOW STRENGTH) 81 mg EC tablet, Take 1 tablet (81 mg total) by mouth 2 (two) times a day for 14 days  (Patient not taking: Reported on 12/31/2024), Disp: 28 tablet, Rfl: 0    naproxen (NAPROSYN) 500 mg tablet, Take 1 tablet (500 mg total) by mouth 2 (two) times a day with meals (Patient not taking: Reported on 3/26/2025), Disp: 60 tablet, Rfl: 0    ondansetron (ZOFRAN-ODT) 4 mg disintegrating tablet, Take 1 tablet (4 mg total) by mouth every 8 (eight) hours as needed for nausea or vomiting (Patient not taking: Reported on 3/26/2025), Disp: 15 tablet, Rfl: 0      Kana Vee    Scribe Attestation      I,:  Kana Vee am acting as a scribe while in the presence of the attending physician.:       I,:  Casper Gutiérrez MD personally performed the services described in this documentation    as scribed in my presence.:

## 2025-03-27 DIAGNOSIS — Z00.00 ENCOUNTER FOR WELLNESS EXAMINATION IN ADULT: Primary | ICD-10-CM

## 2025-05-05 ENCOUNTER — APPOINTMENT (OUTPATIENT)
Dept: LAB | Facility: CLINIC | Age: 50
End: 2025-05-05
Attending: FAMILY MEDICINE
Payer: COMMERCIAL

## 2025-05-05 ENCOUNTER — TRANSCRIBE ORDERS (OUTPATIENT)
Dept: LAB | Facility: CLINIC | Age: 50
End: 2025-05-05

## 2025-05-05 ENCOUNTER — PATIENT MESSAGE (OUTPATIENT)
Dept: FAMILY MEDICINE CLINIC | Facility: CLINIC | Age: 50
End: 2025-05-05

## 2025-05-05 DIAGNOSIS — I51.9 MYXEDEMA HEART DISEASE: Primary | ICD-10-CM

## 2025-05-05 DIAGNOSIS — E03.9 HYPOTHYROIDISM, UNSPECIFIED TYPE: ICD-10-CM

## 2025-05-05 DIAGNOSIS — E03.9 MYXEDEMA HEART DISEASE: Primary | ICD-10-CM

## 2025-05-05 DIAGNOSIS — E78.5 DYSLIPIDEMIA: ICD-10-CM

## 2025-05-05 LAB
CHOLEST SERPL-MCNC: 217 MG/DL (ref ?–200)
HDLC SERPL-MCNC: 61 MG/DL
LDLC SERPL CALC-MCNC: 136 MG/DL (ref 0–100)
TRIGL SERPL-MCNC: 101 MG/DL (ref ?–150)
TSH SERPL DL<=0.05 MIU/L-ACNC: 7.49 UIU/ML (ref 0.45–4.5)

## 2025-05-05 PROCEDURE — 80061 LIPID PANEL: CPT

## 2025-05-05 PROCEDURE — 84443 ASSAY THYROID STIM HORMONE: CPT

## 2025-05-05 PROCEDURE — 36415 COLL VENOUS BLD VENIPUNCTURE: CPT

## 2025-05-07 DIAGNOSIS — E55.9 VITAMIN D DEFICIENCY: ICD-10-CM

## 2025-05-07 DIAGNOSIS — E03.9 HYPOTHYROIDISM, UNSPECIFIED TYPE: Primary | ICD-10-CM

## 2025-05-07 DIAGNOSIS — E78.5 DYSLIPIDEMIA: ICD-10-CM

## 2025-05-07 RX ORDER — LEVOTHYROXINE SODIUM 88 UG/1
88 TABLET ORAL DAILY
Qty: 90 TABLET | Refills: 2 | Status: SHIPPED | OUTPATIENT
Start: 2025-05-07

## 2025-06-26 ENCOUNTER — OFFICE VISIT (OUTPATIENT)
Dept: FAMILY MEDICINE CLINIC | Facility: CLINIC | Age: 50
End: 2025-06-26
Payer: COMMERCIAL

## 2025-06-26 VITALS
HEART RATE: 60 BPM | DIASTOLIC BLOOD PRESSURE: 80 MMHG | HEIGHT: 61 IN | SYSTOLIC BLOOD PRESSURE: 108 MMHG | RESPIRATION RATE: 16 BRPM | OXYGEN SATURATION: 100 % | TEMPERATURE: 98.4 F | WEIGHT: 101 LBS | BODY MASS INDEX: 19.07 KG/M2

## 2025-06-26 DIAGNOSIS — N95.1 PERIMENOPAUSE: ICD-10-CM

## 2025-06-26 DIAGNOSIS — R39.9 UTI SYMPTOMS: ICD-10-CM

## 2025-06-26 DIAGNOSIS — R10.30 LOWER ABDOMINAL PAIN: Primary | ICD-10-CM

## 2025-06-26 LAB
SL AMB  POCT GLUCOSE, UA: NORMAL
SL AMB LEUKOCYTE ESTERASE,UA: NORMAL
SL AMB POCT BILIRUBIN,UA: NORMAL
SL AMB POCT BLOOD,UA: NORMAL
SL AMB POCT CLARITY,UA: CLEAR
SL AMB POCT COLOR,UA: YELLOW
SL AMB POCT KETONES,UA: NORMAL
SL AMB POCT NITRITE,UA: NORMAL
SL AMB POCT PH,UA: 6
SL AMB POCT SPECIFIC GRAVITY,UA: 1.01
SL AMB POCT URINE PROTEIN: NORMAL
SL AMB POCT UROBILINOGEN: NORMAL

## 2025-06-26 PROCEDURE — 81002 URINALYSIS NONAUTO W/O SCOPE: CPT | Performed by: FAMILY MEDICINE

## 2025-06-26 PROCEDURE — 87086 URINE CULTURE/COLONY COUNT: CPT | Performed by: FAMILY MEDICINE

## 2025-06-26 PROCEDURE — 99214 OFFICE O/P EST MOD 30 MIN: CPT | Performed by: FAMILY MEDICINE

## 2025-06-26 NOTE — PROGRESS NOTES
Name: Lilian Hernandez      : 1975      MRN: [id number]  Encounter Provider: Jessica Son MD  Encounter Date: 2025   Encounter department: [facility]    Assessment & Plan  UTI symptoms    Orders:  •  POCT urine dip  •  Urine culture; Future    Lower abdominal pain  Patient presents for evaluation of lower abdominal pain x1 week.   Afebrile.  No dyspepsia or dysuria.  Normal appetite  Pain is worse with full bowel/bladder, worse with sitting/pressure, improves with walking  No rebound or guarding on exam today.  Patient is nontoxic and afebrile.    Proceed with pelvic ultrasound and urine culture.  Trial of FODMAP diet and PRN Gaviscon  If symptoms persist and are pertinent findings on pelvic ultrasound-consider CT abdomen pelvis  Similar episode in 2024 with negative CT abdomen and pelvis and unremarkable colonoscopy in 2024  Orders:  •  US pelvis complete w transvaginal; Future    Perimenopause  BCP, patient takes back-to-back  No menses          Patient Instructions   Schedule pelvic ultrasound  Urine culture is pending, I will keep you posted  If pain is not improving and no pertinent findings on ultrasound we will proceed with CT  Please adhere to strict FODMAP diet and try Gas-X OTC  If it anytime you develop fever, bowel movement pattern change, decreased appetite-call me immediately    History of Present Illness     Lower abdominal pain,started 1 week ago   Generalized lower pain, possibly LLQ > RLQ   No fever, normal  appetite, normal urination .  Normal BM appearance, no melena, no BRBPR  No recent travel  Similar episode a year ago-  seen by GI, had CT abdomen pelvis-negative   No menses,  BCP back to back   Lower abdominal pain and pressure worse with sitting - better with walking   Full bladder or bowel makes symptoms worse  UTD with colonoscopy  CT 24 - no findings to explain her pain  Colonoscopy  10/24- no diverticulosis  GYN care-Dr. Russo, next  "appointment August 28, 2025        Urinary Tract Infection   Pertinent negatives include no frequency, urgency or vomiting.     Review of Systems   Constitutional: Negative.  Negative for appetite change and fever.   Respiratory: Negative.     Gastrointestinal:  Positive for abdominal pain. Negative for constipation, diarrhea and vomiting.        As per HPI   Genitourinary:  Positive for pelvic pain. Negative for dysuria, frequency and urgency.   Neurological: Negative.      Past Medical History[1]  Past Surgical History[2]  Family History[3]  Social History[4]  Medications[5]  Allergies   Allergen Reactions   • Bee Venom Anaphylaxis   • Codeine      Other reaction(s): Palpitations  Reaction Date: 03Aug2011;    • Medical Tape Dermatitis   • Molds & Smuts    • Wound Dressing Adhesive Rash     Redness       Immunization History   Administered Date(s) Administered   • COVID-19 PFIZER VACCINE 0.3 ML IM 03/29/2021, 04/19/2021, 11/19/2021   • COVID-19 Pfizer Vac BIVALENT Ray-sucrose 12 Yr+ IM 09/11/2022   • COVID-19 Pfizer mRNA vacc PF ray-sucrose 12 yr and older (Comirnaty) 09/27/2023   • INFLUENZA 10/12/2021, 09/11/2022, 10/12/2022   • Influenza, injectable, quadrivalent, preservative free 0.5 mL 11/16/2020   • Tdap 09/18/2013, 12/15/2015     Objective   /80 (BP Location: Left arm, Patient Position: Sitting, Cuff Size: Standard)   Pulse 60   Temp 98.4 °F (36.9 °C) (Temporal)   Resp 16   Ht 5' 1\" (1.549 m)   Wt 45.8 kg (101 lb)   SpO2 100%   BMI 19.08 kg/m²     Physical Exam  Vitals and nursing note reviewed.   Constitutional:       General: She is not in acute distress.     Appearance: Normal appearance. She is well-developed. She is not ill-appearing.   HENT:      Head: Normocephalic and atraumatic.     Eyes:      Conjunctiva/sclera: Conjunctivae normal.     Neck:      Thyroid: No thyromegaly.      Vascular: No carotid bruit.     Cardiovascular:      Rate and Rhythm: Normal rate and regular rhythm.      " Heart sounds: Normal heart sounds. No murmur heard.  Pulmonary:      Effort: Pulmonary effort is normal. No respiratory distress.      Breath sounds: Normal breath sounds. No wheezing.   Abdominal:      General: Abdomen is flat. Bowel sounds are increased. There is no distension or abdominal bruit.      Palpations: Abdomen is soft.      Tenderness: There is abdominal tenderness in the right lower quadrant, suprapubic area and left lower quadrant. There is no guarding or rebound.      Hernia: No hernia is present.      Comments: Max tenderness suprapubic area     Musculoskeletal:         General: Normal range of motion.      Cervical back: Neck supple.     Neurological:      General: No focal deficit present.      Mental Status: She is alert and oriented to person, place, and time.      Cranial Nerves: No cranial nerve deficit.      Coordination: Coordination normal.     Psychiatric:         Mood and Affect: Mood normal.         Behavior: Behavior normal.       Results for orders placed or performed in visit on 06/26/25   Urine culture    Specimen: Urine, Other   Result Value Ref Range    Urine Culture 30,000-39,000 cfu/ml    POCT urine dip   Result Value Ref Range    LEUKOCYTE ESTERASE,UA 1+     NITRITE,UA -     SL AMB POCT UROBILINOGEN -     POCT URINE PROTEIN -      PH,UA 6.0     BLOOD,UA -     SPECIFIC GRAVITY,UA 1.015     KETONES,UA -     BILIRUBIN,UA -     GLUCOSE, UA -      COLOR,UA yellow     CLARITY,UA clear                 [1]  Past Medical History:  Diagnosis Date   • Abnormal Pap smear of cervix    • Allergic    • Cluster headache    • Colon polyp    • Disease of thyroid gland    • GERD (gastroesophageal reflux disease)    • Headache(784.0)     Migraine   • Headache, tension-type    • HL (hearing loss)     Sudden hearing loss hearing mostly came back   • Hypothyroidism    • Migraine    [2]  Past Surgical History:  Procedure Laterality Date   • ANKLE SURGERY Left 03/2020   • ANTERIOR CRUCIATE LIGAMENT  REPAIR Right 2007   • COLONOSCOPY     • FL INJECTION LEFT HIP (ARTHROGRAM)  08/23/2021   • HIP SURGERY Left 2014    nerve entrapment release   • [place] ARTHROSCOPY KNEE W/MENISCUS RPR MEDIAL/LATERAL Right 12/20/2024    Procedure: Diagnostic right knee arthroscopy, limited synovectomy;  Surgeon: Casper Gutiérrez MD;  Location: [facility];  Service: Orthopedics   • TENDON REPAIR  04/06    ACL Repair   • WRIST SURGERY Right 03/2009    Dr Cifuentes   • WRIST SURGERY Right 02/07/2020   [3]  Family History  Problem Relation Name Age of Onset   • Hypothyroidism Mother Savanna    • Migraines Mother Savanna    • Thyroid disease Mother Savanna    • Transient ischemic attack Mother Savanna    • Hyperlipidemia Father Abdi         Pure   • Anxiety disorder Sister Chelsie    • Hyperlipidemia Sister Chelsie    • Ovarian cancer Maternal Grandmother Dulce 50        Age 76   • Cancer Maternal Grandmother Dulce         Ovarian at 76   • Lymphoma Maternal Grandfather Kevin 82   • Stomach cancer Paternal Grandmother Nelli 90   • Cancer Paternal Grandmother Nelli         Pancreatic at 92   • No Known Problems Paternal Grandfather Lucien    • Prostate cancer Maternal Uncle Kevin    • Prostate cancer Paternal Uncle Edwin    • Breast cancer Other Maternal Great Aunt    [4]  Social History  Tobacco Use   • Smoking status: Never     Passive exposure: Past   • Smokeless tobacco: Never   Vaping Use   • Vaping status: Never Used   Substance and Sexual Activity   • Alcohol use: Not Currently     Comment: Social    • Drug use: Yes     Comment: CBD gummies   • Sexual activity: Not Currently     Partners: Male     Birth control/protection: Other     Comment: Birth Control loloestrin   [5]  Current Outpatient Medications on File Prior to Visit   Medication Sig   • EPINEPHrine (EPIPEN) 0.3 mg/0.3 mL SOAJ INJECT 0.3 ML (0.3 MG TOTAL) INTO A MUSCLE AS NEEDED FOR ANAPHYLAXIS   • famotidine (Pepcid) 40 MG tablet Take 40 mg by mouth in the morning.   • fluticasone  (FLONASE) 50 mcg/act nasal spray 1 spray into each nostril in the morning.   • levothyroxine 88 mcg tablet Take 1 tablet (88 mcg total) by mouth daily   • Lo Loestrin Fe 1 MG-10 MCG / 10 MCG TABS Take 1 tablet by mouth daily   • multivitamin (THERAGRAN) TABS Take 1 tablet by mouth in the morning.   • omeprazole (PriLOSEC) 20 mg delayed release capsule Take 1 capsule (20 mg total) by mouth daily before breakfast   • rimegepant sulfate (NURTEC) 75 mg TBDP Take one NURTEC 75 mg at onset under tongue. Limit 1 in 24 hours   • acetaminophen (TYLENOL) 650 mg CR tablet Take 1 tablet (650 mg total) by mouth every 6 (six) hours as needed for mild pain (Patient not taking: Reported on 12/23/2024)   • aspirin (ECOTRIN LOW STRENGTH) 81 mg EC tablet Take 1 tablet (81 mg total) by mouth 2 (two) times a day for 14 days (Patient not taking: Reported on 12/31/2024)

## 2025-06-26 NOTE — ASSESSMENT & PLAN NOTE
Patient presents for evaluation of lower abdominal pain x1 week.   Afebrile.  No dyspepsia or dysuria.  Normal appetite  Pain is worse with full bowel/bladder, worse with sitting/pressure, improves with walking  No rebound or guarding on exam today.  Patient is nontoxic and afebrile.    Proceed with pelvic ultrasound and urine culture.  Trial of FODMAP diet and PRN Gaviscon  If symptoms persist and are pertinent findings on pelvic ultrasound-consider CT abdomen pelvis  Similar episode in fall 2024 with negative CT abdomen and pelvis and unremarkable colonoscopy in October 2024  Orders:  •  US pelvis complete w transvaginal; Future

## 2025-06-29 LAB — BACTERIA UR CULT: NORMAL

## 2025-07-03 ENCOUNTER — HOSPITAL ENCOUNTER (OUTPATIENT)
Dept: RADIOLOGY | Facility: HOSPITAL | Age: 50
Discharge: HOME | End: 2025-07-03
Attending: FAMILY MEDICINE
Payer: COMMERCIAL

## 2025-07-03 DIAGNOSIS — R10.30 LOWER ABDOMINAL PAIN: ICD-10-CM

## 2025-07-03 PROCEDURE — 76856 US EXAM PELVIC COMPLETE: CPT

## 2025-07-03 PROCEDURE — 76830 TRANSVAGINAL US NON-OB: CPT

## 2025-07-11 ENCOUNTER — APPOINTMENT (OUTPATIENT)
Dept: LAB | Facility: CLINIC | Age: 50
End: 2025-07-11
Attending: FAMILY MEDICINE
Payer: COMMERCIAL

## 2025-07-11 ENCOUNTER — TRANSCRIBE ORDERS (OUTPATIENT)
Dept: LAB | Facility: CLINIC | Age: 50
End: 2025-07-11

## 2025-07-11 DIAGNOSIS — E03.9 HYPOTHYROIDISM, UNSPECIFIED TYPE: ICD-10-CM

## 2025-07-11 LAB — TSH SERPL DL<=0.05 MIU/L-ACNC: 0.33 UIU/ML (ref 0.45–4.5)

## 2025-07-11 PROCEDURE — 36415 COLL VENOUS BLD VENIPUNCTURE: CPT

## 2025-07-11 PROCEDURE — 84443 ASSAY THYROID STIM HORMONE: CPT

## 2025-07-15 DIAGNOSIS — E03.9 HYPOTHYROIDISM, UNSPECIFIED TYPE: Primary | ICD-10-CM

## 2025-07-16 ENCOUNTER — TELEPHONE (OUTPATIENT)
Dept: GASTROENTEROLOGY | Facility: CLINIC | Age: 50
End: 2025-07-16

## 2025-07-17 DIAGNOSIS — R10.30 LOWER ABDOMINAL PAIN: Primary | ICD-10-CM

## 2025-08-04 ENCOUNTER — HOSPITAL ENCOUNTER (OUTPATIENT)
Dept: RADIOLOGY | Facility: HOSPITAL | Age: 50
Discharge: HOME | End: 2025-08-04
Attending: FAMILY MEDICINE
Payer: COMMERCIAL

## 2025-08-04 DIAGNOSIS — R10.30 LOWER ABDOMINAL PAIN: ICD-10-CM

## 2025-08-04 PROCEDURE — 74177 CT ABD & PELVIS W/CONTRAST: CPT

## 2025-08-04 RX ADMIN — IOHEXOL 85 ML: 350 INJECTION, SOLUTION INTRAVENOUS at 14:06

## (undated) DEVICE — MEDI-VAC YANKAUER SUCTION HANDLE W/BULBOUS AND CONTROL VENT: Brand: CARDINAL HEALTH

## (undated) DEVICE — GLOVE SRG BIOGEL 7.5

## (undated) DEVICE — 10FR FRAZIER SUCTION HANDLE: Brand: CARDINAL HEALTH

## (undated) DEVICE — GLOVE SRG BIOGEL 8

## (undated) DEVICE — 3M™ STERI-STRIP™ REINFORCED ADHESIVE SKIN CLOSURES, R1547, 1/2 IN X 4 IN (12 MM X 100 MM), 6 STRIPS/ENVELOPE: Brand: 3M™ STERI-STRIP™

## (undated) DEVICE — TIBURON SPLIT SHEET: Brand: CONVERTORS

## (undated) DEVICE — PROBE ABLATION  APOLLO RF 90 DEG MULTI PORT

## (undated) DEVICE — ABDOMINAL PAD: Brand: DERMACEA

## (undated) DEVICE — MAT ABSORBANT ARTHROSCOPY FLOOR 46 X 40 IN

## (undated) DEVICE — BETHLEHEM UNIVERSAL  ARTHRO PK: Brand: CARDINAL HEALTH

## (undated) DEVICE — PUDDLE VAC

## (undated) DEVICE — BLADE SHAVER DISSECTOR 4MM 13CM COOLCUT

## (undated) DEVICE — ACE WRAP 6 IN UNSTERILE

## (undated) DEVICE — SUT MONOCRYL 2-0 SH 27 IN Y417H

## (undated) DEVICE — 3M™ IOBAN™ 2 ANTIMICROBIAL INCISE DRAPE 6640EZ: Brand: IOBAN™ 2

## (undated) DEVICE — 3M™ STERI-DRAPE™ U-DRAPE 1015: Brand: STERI-DRAPE™

## (undated) DEVICE — CYSTO TUBING TUR Y IRRIGATION

## (undated) DEVICE — GLOVE INDICATOR PI UNDERGLOVE SZ 7 BLUE

## (undated) DEVICE — SYRINGE 20ML LL

## (undated) DEVICE — GLOVE SRG BIOGEL 6.5

## (undated) DEVICE — GLOVE INDICATOR PI UNDERGLOVE SZ 8.5 BLUE

## (undated) DEVICE — PADDING CAST 4 IN  COTTON STRL

## (undated) DEVICE — INTENDED FOR TISSUE SEPARATION, AND OTHER PROCEDURES THAT REQUIRE A SHARP SURGICAL BLADE TO PUNCTURE OR CUT.: Brand: BARD-PARKER ® CARBON RIB-BACK BLADES

## (undated) DEVICE — NEEDLE 18 G X 1 1/2

## (undated) DEVICE — PADDING CAST 6IN COTTON STRL

## (undated) DEVICE — TUBING EXTENSION 6 FT CONTIN WAVE

## (undated) DEVICE — IMPERVIOUS STOCKINETTE: Brand: DEROYAL

## (undated) DEVICE — ASTOUND STANDARD SURGICAL GOWN, XL: Brand: CONVERTORS